# Patient Record
Sex: MALE | Race: WHITE | NOT HISPANIC OR LATINO | URBAN - METROPOLITAN AREA
[De-identification: names, ages, dates, MRNs, and addresses within clinical notes are randomized per-mention and may not be internally consistent; named-entity substitution may affect disease eponyms.]

---

## 2018-06-12 VITALS
TEMPERATURE: 98 F | WEIGHT: 244.93 LBS | RESPIRATION RATE: 14 BRPM | DIASTOLIC BLOOD PRESSURE: 75 MMHG | SYSTOLIC BLOOD PRESSURE: 146 MMHG | HEART RATE: 85 BPM | HEIGHT: 64 IN | OXYGEN SATURATION: 98 %

## 2018-06-12 RX ORDER — CHLORHEXIDINE GLUCONATE 213 G/1000ML
1 SOLUTION TOPICAL ONCE
Qty: 0 | Refills: 0 | Status: DISCONTINUED | OUTPATIENT
Start: 2018-06-13 | End: 2018-06-14

## 2018-06-12 NOTE — H&P ADULT - PMH
Diabetes BPH (benign prostatic hyperplasia)    CAD (coronary artery disease)    Diabetes    HTN (hypertension)

## 2018-06-12 NOTE — H&P ADULT - FAMILY HISTORY
Father  Still living? Unknown  Family history of early CAD, Age at diagnosis: 51-60  Family history of acute myocardial infarction, Age at diagnosis: 51-60     Sibling  Still living? Unknown  Family history of early CAD, Age at diagnosis: 51-60

## 2018-06-12 NOTE — H&P ADULT - NSHPLABSRESULTS_GEN_ALL_CORE
13.4   6.8   )-----------( 208      ( 13 Jun 2018 13:07 )             40.8     06-13    140  |  103  |  14  ----------------------------<  143<H>  4.5   |  23  |  0.76    Ca    8.9      13 Jun 2018 13:07      PT/INR - ( 13 Jun 2018 13:07 )   PT: 10.5 sec;   INR: 0.95          PTT - ( 13 Jun 2018 13:07 )  PTT:30.4 sec    CARDIAC MARKERS ( 13 Jun 2018 13:07 )  x     / x     / 85 U/L / x     / 2.6 ng/mL      EKG: NSR @ 81 BPM with intermittent PACs

## 2018-06-12 NOTE — H&P ADULT - ASSESSMENT
Patient is a 68 y/o Spanish SPEAKING male with PMHx of HTN, non-obstructive CAD (s/p diagnostic cath at [facility] in 2013 which revealed mRCA 30-50%, dLCx 30-50%, LVEF 60%), DM II, BPH who presented to Dr. Solis's office with c/o ALFONSO upon ambulation of 3 city blocks over past couple years, resolving with rest.  In light of pt's risk factors, CCS II anginal equivalent symptoms, and abnormal stress test pt is referred for cardiac catheterization with possible intervention if clinically indicated to rule out suspected underlying CAD    ASA III, Mallampati III    H/H 13.4/40.8. Denies bleeding, GI bleeding, hematemesis, hematuria, BRBPR or melena . Last dose ASA 81 mg last night. Will load with  mg x1 and plavix 600 mg x1 pre-cath.  Cr.0.76.  IV NS@ 75 cc/hr pre-cath.  Pre-cath consented  Sedation Plan: Moderate  Patient is Suitable Candidate for Sedation: Yes  Risks & benefits of procedure and alternative therapy have been explained to the patient including but not limited to: allergic reaction, bleeding w/possible need for blood transfusion, infection, renal and vascular compromise, limb damage, arrhythmia, stroke, vessel dissection/perforation, Myocardial infarction, emergent CABG. Informed consent obtained and in chart

## 2018-06-12 NOTE — H&P ADULT - HISTORY OF PRESENT ILLNESS
Patient is a 68 y/o male with PMHx of DM II    Denies CP, SOB, palpitations, syncope, dizziness, PND/orthopnea or LE edema    Stress test 05/11/2018: EF 57%, large size partially reversible perfusion defect of moderate intensity involving the entire inferior wall suggestive of myocardial ischemia in the posterior coronary circulation distribution    In light of pt's risk factors, CCS ___ anginal symptoms, and abnormal stress test pt is referred for cardiac catheterization with possible intervention if clinically indicated to rule out CAD Patient is a 66 y/o Occitan SPEAKING male with PMHx of DM II    Denies CP, SOB, palpitations, syncope, dizziness, PND/orthopnea or LE edema    Stress test 05/11/2018: EF 57%, large size partially reversible perfusion defect of moderate intensity involving the entire inferior wall suggestive of myocardial ischemia in the posterior coronary circulation distribution    In light of pt's risk factors, CCS ___ anginal symptoms, and abnormal stress test pt is referred for cardiac catheterization with possible intervention if clinically indicated to rule out CAD Patient is a 66 y/o Sami SPEAKING male with PMHx of HTN, non-obstructive CAD (s/p diagnostic cath at [facility] in 2013 which revealed mRCA 30-50%, dLCx 30-50%, LVEF 60%), DM II, BPH who presented to Dr. Solis's office with c/o ALFONSO upon ambulation of 3 city blocks over past couple years, resolving with rest.  Denies CP, SOB at rest, palpitations, syncope, dizziness, PND/orthopnea or LE edema. Stress test 05/11/2018: EF 57%, large size partially reversible perfusion defect of moderate intensity involving the entire inferior wall suggestive of myocardial ischemia in the posterior coronary circulation distribution. In light of pt's risk factors, CCS II anginal equivalent symptoms, and abnormal stress test pt is referred for cardiac catheterization with possible intervention if clinically indicated to rule out CAD    Cath hx:  Diagnostic cath 8/21/13 @ [place] which revealed mRCA 30-50% (IVUS 5.4 mm2), dLCx 30-50%, LVEF 60%

## 2018-06-13 ENCOUNTER — INPATIENT (INPATIENT)
Facility: HOSPITAL | Age: 68
LOS: 0 days | Discharge: HOME | DRG: 247 | End: 2018-06-14
Attending: INTERNAL MEDICINE | Admitting: INTERNAL MEDICINE
Payer: MEDICARE

## 2018-06-13 DIAGNOSIS — Z98.61 CORONARY ANGIOPLASTY STATUS: Chronic | ICD-10-CM

## 2018-06-13 LAB
ANION GAP SERPL CALC-SCNC: 14 MMOL/L — SIGNIFICANT CHANGE UP (ref 5–17)
APTT BLD: 30.4 SEC — SIGNIFICANT CHANGE UP (ref 27.5–37.4)
BASOPHILS NFR BLD AUTO: 0.1 % — SIGNIFICANT CHANGE UP (ref 0–2)
BUN SERPL-MCNC: 14 MG/DL — SIGNIFICANT CHANGE UP (ref 7–23)
CALCIUM SERPL-MCNC: 8.9 MG/DL — SIGNIFICANT CHANGE UP (ref 8.4–10.5)
CHLORIDE SERPL-SCNC: 103 MMOL/L — SIGNIFICANT CHANGE UP (ref 96–108)
CHOLEST SERPL-MCNC: 178 MG/DL — SIGNIFICANT CHANGE UP (ref 10–199)
CK MB CFR SERPL CALC: 2.6 NG/ML — SIGNIFICANT CHANGE UP (ref 0–6.7)
CO2 SERPL-SCNC: 23 MMOL/L — SIGNIFICANT CHANGE UP (ref 22–31)
CREAT SERPL-MCNC: 0.76 MG/DL — SIGNIFICANT CHANGE UP (ref 0.5–1.3)
CRP SERPL-MCNC: 0.72 MG/DL — HIGH (ref 0–0.4)
EOSINOPHIL NFR BLD AUTO: 0.7 % — SIGNIFICANT CHANGE UP (ref 0–6)
GLUCOSE SERPL-MCNC: 143 MG/DL — HIGH (ref 70–99)
HBA1C BLD-MCNC: 8.4 % — HIGH (ref 4–5.6)
HCT VFR BLD CALC: 40.8 % — SIGNIFICANT CHANGE UP (ref 39–50)
HDLC SERPL-MCNC: 50 MG/DL — SIGNIFICANT CHANGE UP (ref 40–125)
HGB BLD-MCNC: 13.4 G/DL — SIGNIFICANT CHANGE UP (ref 13–17)
INR BLD: 0.95 — SIGNIFICANT CHANGE UP (ref 0.88–1.16)
LIPID PNL WITH DIRECT LDL SERPL: 109 MG/DL — SIGNIFICANT CHANGE UP
LYMPHOCYTES # BLD AUTO: 25.8 % — SIGNIFICANT CHANGE UP (ref 13–44)
MCHC RBC-ENTMCNC: 25.7 PG — LOW (ref 27–34)
MCHC RBC-ENTMCNC: 32.8 G/DL — SIGNIFICANT CHANGE UP (ref 32–36)
MCV RBC AUTO: 78.2 FL — LOW (ref 80–100)
MONOCYTES NFR BLD AUTO: 8.9 % — SIGNIFICANT CHANGE UP (ref 2–14)
NEUTROPHILS NFR BLD AUTO: 64.5 % — SIGNIFICANT CHANGE UP (ref 43–77)
PLATELET # BLD AUTO: 208 K/UL — SIGNIFICANT CHANGE UP (ref 150–400)
POTASSIUM SERPL-MCNC: 4.5 MMOL/L — SIGNIFICANT CHANGE UP (ref 3.5–5.3)
POTASSIUM SERPL-SCNC: 4.5 MMOL/L — SIGNIFICANT CHANGE UP (ref 3.5–5.3)
PROTHROM AB SERPL-ACNC: 10.5 SEC — SIGNIFICANT CHANGE UP (ref 9.8–12.7)
RBC # BLD: 5.22 M/UL — SIGNIFICANT CHANGE UP (ref 4.2–5.8)
RBC # FLD: 14.2 % — SIGNIFICANT CHANGE UP (ref 10.3–16.9)
SODIUM SERPL-SCNC: 140 MMOL/L — SIGNIFICANT CHANGE UP (ref 135–145)
TOTAL CHOLESTEROL/HDL RATIO MEASUREMENT: 3.6 RATIO — SIGNIFICANT CHANGE UP (ref 3.4–9.6)
TRIGL SERPL-MCNC: 96 MG/DL — SIGNIFICANT CHANGE UP (ref 10–149)
WBC # BLD: 6.8 K/UL — SIGNIFICANT CHANGE UP (ref 3.8–10.5)
WBC # FLD AUTO: 6.8 K/UL — SIGNIFICANT CHANGE UP (ref 3.8–10.5)

## 2018-06-13 RX ORDER — CLOPIDOGREL BISULFATE 75 MG/1
75 TABLET, FILM COATED ORAL DAILY
Qty: 0 | Refills: 0 | Status: DISCONTINUED | OUTPATIENT
Start: 2018-06-14 | End: 2018-06-14

## 2018-06-13 RX ORDER — INSULIN LISPRO 100/ML
VIAL (ML) SUBCUTANEOUS
Qty: 0 | Refills: 0 | Status: DISCONTINUED | OUTPATIENT
Start: 2018-06-13 | End: 2018-06-14

## 2018-06-13 RX ORDER — SODIUM CHLORIDE 9 MG/ML
1000 INJECTION, SOLUTION INTRAVENOUS
Qty: 0 | Refills: 0 | Status: DISCONTINUED | OUTPATIENT
Start: 2018-06-13 | End: 2018-06-14

## 2018-06-13 RX ORDER — LISINOPRIL 2.5 MG/1
40 TABLET ORAL DAILY
Qty: 0 | Refills: 0 | Status: DISCONTINUED | OUTPATIENT
Start: 2018-06-13 | End: 2018-06-14

## 2018-06-13 RX ORDER — SODIUM CHLORIDE 9 MG/ML
500 INJECTION INTRAMUSCULAR; INTRAVENOUS; SUBCUTANEOUS
Qty: 0 | Refills: 0 | Status: DISCONTINUED | OUTPATIENT
Start: 2018-06-13 | End: 2018-06-13

## 2018-06-13 RX ORDER — INSULIN LISPRO 100/ML
VIAL (ML) SUBCUTANEOUS ONCE
Qty: 0 | Refills: 0 | Status: DISCONTINUED | OUTPATIENT
Start: 2018-06-13 | End: 2018-06-13

## 2018-06-13 RX ORDER — CLOPIDOGREL BISULFATE 75 MG/1
600 TABLET, FILM COATED ORAL ONCE
Qty: 0 | Refills: 0 | Status: COMPLETED | OUTPATIENT
Start: 2018-06-13 | End: 2018-06-13

## 2018-06-13 RX ORDER — GABAPENTIN 400 MG/1
300 CAPSULE ORAL DAILY
Qty: 0 | Refills: 0 | Status: DISCONTINUED | OUTPATIENT
Start: 2018-06-13 | End: 2018-06-14

## 2018-06-13 RX ORDER — ASPIRIN/CALCIUM CARB/MAGNESIUM 324 MG
81 TABLET ORAL DAILY
Qty: 0 | Refills: 0 | Status: DISCONTINUED | OUTPATIENT
Start: 2018-06-14 | End: 2018-06-14

## 2018-06-13 RX ORDER — ASPIRIN/CALCIUM CARB/MAGNESIUM 324 MG
325 TABLET ORAL ONCE
Qty: 0 | Refills: 0 | Status: COMPLETED | OUTPATIENT
Start: 2018-06-13 | End: 2018-06-13

## 2018-06-13 RX ORDER — OXYBUTYNIN CHLORIDE 5 MG
5 TABLET ORAL
Qty: 0 | Refills: 0 | Status: DISCONTINUED | OUTPATIENT
Start: 2018-06-13 | End: 2018-06-14

## 2018-06-13 RX ORDER — TOLTERODINE TARTRATE 1 MG/1
1 TABLET, FILM COATED ORAL
Qty: 0 | Refills: 0 | COMMUNITY

## 2018-06-13 RX ORDER — TAMSULOSIN HYDROCHLORIDE 0.4 MG/1
0.4 CAPSULE ORAL DAILY
Qty: 0 | Refills: 0 | Status: DISCONTINUED | OUTPATIENT
Start: 2018-06-13 | End: 2018-06-14

## 2018-06-13 RX ORDER — SODIUM CHLORIDE 9 MG/ML
1000 INJECTION INTRAMUSCULAR; INTRAVENOUS; SUBCUTANEOUS
Qty: 0 | Refills: 0 | Status: DISCONTINUED | OUTPATIENT
Start: 2018-06-13 | End: 2018-06-14

## 2018-06-13 RX ADMIN — Medication 5 MILLIGRAM(S): at 19:31

## 2018-06-13 RX ADMIN — GABAPENTIN 300 MILLIGRAM(S): 400 CAPSULE ORAL at 19:32

## 2018-06-13 RX ADMIN — SODIUM CHLORIDE 75 MILLILITER(S): 9 INJECTION INTRAMUSCULAR; INTRAVENOUS; SUBCUTANEOUS at 14:48

## 2018-06-13 RX ADMIN — Medication 2: at 22:08

## 2018-06-13 RX ADMIN — LISINOPRIL 40 MILLIGRAM(S): 2.5 TABLET ORAL at 19:31

## 2018-06-13 RX ADMIN — SODIUM CHLORIDE 75 MILLILITER(S): 9 INJECTION INTRAMUSCULAR; INTRAVENOUS; SUBCUTANEOUS at 17:46

## 2018-06-13 RX ADMIN — Medication 325 MILLIGRAM(S): at 14:45

## 2018-06-13 RX ADMIN — SODIUM CHLORIDE 75 MILLILITER(S): 9 INJECTION INTRAMUSCULAR; INTRAVENOUS; SUBCUTANEOUS at 19:31

## 2018-06-13 RX ADMIN — CLOPIDOGREL BISULFATE 600 MILLIGRAM(S): 75 TABLET, FILM COATED ORAL at 14:45

## 2018-06-13 RX ADMIN — TAMSULOSIN HYDROCHLORIDE 0.4 MILLIGRAM(S): 0.4 CAPSULE ORAL at 22:08

## 2018-06-14 ENCOUNTER — TRANSCRIPTION ENCOUNTER (OUTPATIENT)
Age: 68
End: 2018-06-14

## 2018-06-14 VITALS — TEMPERATURE: 97 F

## 2018-06-14 LAB
ANION GAP SERPL CALC-SCNC: 14 MMOL/L — SIGNIFICANT CHANGE UP (ref 5–17)
BUN SERPL-MCNC: 14 MG/DL — SIGNIFICANT CHANGE UP (ref 7–23)
CALCIUM SERPL-MCNC: 9.1 MG/DL — SIGNIFICANT CHANGE UP (ref 8.4–10.5)
CHLORIDE SERPL-SCNC: 98 MMOL/L — SIGNIFICANT CHANGE UP (ref 96–108)
CO2 SERPL-SCNC: 23 MMOL/L — SIGNIFICANT CHANGE UP (ref 22–31)
CREAT SERPL-MCNC: 0.75 MG/DL — SIGNIFICANT CHANGE UP (ref 0.5–1.3)
GLUCOSE BLDC GLUCOMTR-MCNC: 288 MG/DL — HIGH (ref 70–99)
GLUCOSE SERPL-MCNC: 201 MG/DL — HIGH (ref 70–99)
HCT VFR BLD CALC: 41.1 % — SIGNIFICANT CHANGE UP (ref 39–50)
HGB BLD-MCNC: 13.3 G/DL — SIGNIFICANT CHANGE UP (ref 13–17)
MAGNESIUM SERPL-MCNC: 1.8 MG/DL — SIGNIFICANT CHANGE UP (ref 1.6–2.6)
MCHC RBC-ENTMCNC: 25.4 PG — LOW (ref 27–34)
MCHC RBC-ENTMCNC: 32.4 G/DL — SIGNIFICANT CHANGE UP (ref 32–36)
MCV RBC AUTO: 78.4 FL — LOW (ref 80–100)
PLATELET # BLD AUTO: 214 K/UL — SIGNIFICANT CHANGE UP (ref 150–400)
POTASSIUM SERPL-MCNC: 4.1 MMOL/L — SIGNIFICANT CHANGE UP (ref 3.5–5.3)
POTASSIUM SERPL-SCNC: 4.1 MMOL/L — SIGNIFICANT CHANGE UP (ref 3.5–5.3)
RBC # BLD: 5.24 M/UL — SIGNIFICANT CHANGE UP (ref 4.2–5.8)
RBC # FLD: 14.5 % — SIGNIFICANT CHANGE UP (ref 10.3–16.9)
SODIUM SERPL-SCNC: 135 MMOL/L — SIGNIFICANT CHANGE UP (ref 135–145)
WBC # BLD: 7.1 K/UL — SIGNIFICANT CHANGE UP (ref 3.8–10.5)
WBC # FLD AUTO: 7.1 K/UL — SIGNIFICANT CHANGE UP (ref 3.8–10.5)

## 2018-06-14 PROCEDURE — 83735 ASSAY OF MAGNESIUM: CPT

## 2018-06-14 PROCEDURE — 80048 BASIC METABOLIC PNL TOTAL CA: CPT

## 2018-06-14 PROCEDURE — 82550 ASSAY OF CK (CPK): CPT

## 2018-06-14 PROCEDURE — C1769: CPT

## 2018-06-14 PROCEDURE — 82553 CREATINE MB FRACTION: CPT

## 2018-06-14 PROCEDURE — 99238 HOSP IP/OBS DSCHRG MGMT 30/<: CPT

## 2018-06-14 PROCEDURE — 80061 LIPID PANEL: CPT

## 2018-06-14 PROCEDURE — C1725: CPT

## 2018-06-14 PROCEDURE — 85027 COMPLETE CBC AUTOMATED: CPT

## 2018-06-14 PROCEDURE — C1874: CPT

## 2018-06-14 PROCEDURE — 86140 C-REACTIVE PROTEIN: CPT

## 2018-06-14 PROCEDURE — 85730 THROMBOPLASTIN TIME PARTIAL: CPT

## 2018-06-14 PROCEDURE — 85025 COMPLETE CBC W/AUTO DIFF WBC: CPT

## 2018-06-14 PROCEDURE — 82962 GLUCOSE BLOOD TEST: CPT

## 2018-06-14 PROCEDURE — C1889: CPT

## 2018-06-14 PROCEDURE — 36415 COLL VENOUS BLD VENIPUNCTURE: CPT

## 2018-06-14 PROCEDURE — 85610 PROTHROMBIN TIME: CPT

## 2018-06-14 PROCEDURE — 83036 HEMOGLOBIN GLYCOSYLATED A1C: CPT

## 2018-06-14 PROCEDURE — C1887: CPT

## 2018-06-14 RX ORDER — ATORVASTATIN CALCIUM 80 MG/1
1 TABLET, FILM COATED ORAL
Qty: 30 | Refills: 1 | OUTPATIENT
Start: 2018-06-14 | End: 2018-08-12

## 2018-06-14 RX ORDER — CLOPIDOGREL BISULFATE 75 MG/1
1 TABLET, FILM COATED ORAL
Qty: 0 | Refills: 0 | DISCHARGE
Start: 2018-06-14

## 2018-06-14 RX ORDER — CLOPIDOGREL BISULFATE 75 MG/1
1 TABLET, FILM COATED ORAL
Qty: 30 | Refills: 11 | OUTPATIENT
Start: 2018-06-14 | End: 2019-06-08

## 2018-06-14 RX ORDER — SITAGLIPTIN AND METFORMIN HYDROCHLORIDE 500; 50 MG/1; MG/1
1 TABLET, FILM COATED ORAL
Qty: 0 | Refills: 0 | COMMUNITY

## 2018-06-14 RX ORDER — ATORVASTATIN CALCIUM 80 MG/1
1 TABLET, FILM COATED ORAL
Qty: 0 | Refills: 0 | DISCHARGE
Start: 2018-06-14

## 2018-06-14 RX ORDER — ENOXAPARIN SODIUM 100 MG/ML
30 INJECTION SUBCUTANEOUS
Qty: 0 | Refills: 0 | COMMUNITY

## 2018-06-14 RX ORDER — ATORVASTATIN CALCIUM 80 MG/1
40 TABLET, FILM COATED ORAL AT BEDTIME
Qty: 0 | Refills: 0 | Status: DISCONTINUED | OUTPATIENT
Start: 2018-06-14 | End: 2018-06-14

## 2018-06-14 RX ADMIN — GABAPENTIN 300 MILLIGRAM(S): 400 CAPSULE ORAL at 09:52

## 2018-06-14 RX ADMIN — Medication 81 MILLIGRAM(S): at 09:52

## 2018-06-14 RX ADMIN — LISINOPRIL 40 MILLIGRAM(S): 2.5 TABLET ORAL at 06:03

## 2018-06-14 RX ADMIN — Medication 5 MILLIGRAM(S): at 06:03

## 2018-06-14 RX ADMIN — Medication 2: at 06:59

## 2018-06-14 RX ADMIN — CLOPIDOGREL BISULFATE 75 MILLIGRAM(S): 75 TABLET, FILM COATED ORAL at 09:52

## 2018-06-14 NOTE — DISCHARGE NOTE ADULT - PLAN OF CARE
TAKE YOUR MEDICATIONS AS PRESCRIBED You have a diagnosis of coronary artery disease and  received Drug Eluting Stents to your proximal Left Cicumflex and distal Left Anterior descending Coronary Arteries.  You have been started on Aspirin 81mg daily and Plavix (Clopidogrel) 75mg daily. You MUST continue taking the daily Aspirin and Plavix to ensure your stent does not close. DO NOT STOP THESE MEDICATIONS FOR ANY REASON UNLESS OTHERWISE INDICATED BY YOUR CARDIOLOGIST BECAUSE THIS WILL PUT YOU AT RISK FOR A HEART ATTACK. You should refrain from strenuous activity and heavy lifting for 1 week. Please make a follow up appointment with your cardiologist Dr Solis within 1-2 weeks of your discharge. You have also been started on Atorvastatin (Lipitor) 40mg daily at bedtime to aid in preventing the progression of coronary disease and to keep your cholesterol levels controlled.  All of your prescriptions have been sent electronically to your pharmacy.   The catheter from your wrist was removed and bleeding was stopped by manual pressure.  Wash the site daily with soap and water.  There is no need to put on a bandage.      Call the Interventional Cardiology and Vascular Team at [phone] or [phone] if any of following occur pertaining to your vascular access site:  Bleeding or hematoma formation (collection of blood under the skin), drainage or redness at the puncture site, numbness, decrease in strength, coolness or pale coloration of skin of the leg or hand. You may resume your JanuMet on Saturday June 16, 2018.  Continue taking your Lantus as prescribed in the evening.  Follow up with your PMD/Endocrinologist for continued management of your Diabetes Continue taking your Ramipril Continue your BPH medications and follow up with your PMD/Urologist

## 2018-06-14 NOTE — DISCHARGE NOTE ADULT - HOSPITAL COURSE
66 y/o Icelandic/English SPEAKING male with PMHx of HTN, non-obstructive CAD (s/p diagnostic cath at [facility] in 2013 which revealed mRCA 30-50%, dLCx 30-50%, LVEF 60%), DM II, BPH who presented to Dr. Solis's office with c/o ALFONSO upon ambulation of 3 city blocks over past couple years, resolving with rest.  Denies CP, SOB at rest, palpitations, syncope, dizziness, PND/orthopnea or LE edema. Stress test 05/11/2018: EF 57%, large size partially reversible perfusion defect of moderate intensity involving the entire inferior wall suggestive of myocardial ischemia in the posterior coronary circulation distribution. In light of pt's risk factors, CCS II anginal equivalent symptoms, and abnormal stress test pt is referred for cardiac catheterization with possible intervention if clinically indicated to rule out CAD    Pt underwent Cath 6/13 showing     LM- normal, LAD- mid 40%, distal 85%, D1- prox 40%, LCx- prox 80%, RCA- dominant, mid 40%, distal 50%, small vessel distally. Pt had successful PCI STEVEN of prox LCx & distal LAD.  LVEDP 10, NO AS. Right Radial access.  Atorvastatin 40mg once daily at bedtime started.      Pt did well overnight, labs & ECG this am reviewed and stable.  On exam pt feels well with no chest pain, reports mild wrist tenderness over radial site.  Access soft, tender to palpation, no hematoma, pulse 2+, cap refill < 3sec.   Pt cleared for discharge home today and will f/u with Dr Solis in 1-2 weeks.  All prescriptions sent to pt’s pharmacy, instructions given and signed by pt. 66 y/o Georgian/English SPEAKING male with PMHx of HTN, non-obstructive CAD (s/p diagnostic cath at [facility] in 2013 which revealed mRCA 30-50%, dLCx 30-50%, LVEF 60%), DM II, BPH who presented to Dr. Solis's office with c/o ALFONSO upon ambulation of 3 city blocks over past couple years, resolving with rest.  Denies CP, SOB at rest, palpitations, syncope, dizziness, PND/orthopnea or LE edema. Stress test 05/11/2018: EF 57%, large size partially reversible perfusion defect of moderate intensity involving the entire inferior wall suggestive of myocardial ischemia in the posterior coronary circulation distribution. In light of pt's risk factors, CCS II anginal equivalent symptoms, and abnormal stress test pt is referred for cardiac catheterization with possible intervention if clinically indicated to rule out CAD    Pt underwent Cath 6/13 showing     LM- normal, LAD- mid 40%, distal 85%, D1- prox 40%, LCx- prox 80%, RCA- dominant, mid 40%, distal 50%, small vessel distally. Pt had successful PCI STEVEN of prox LCx & distal LAD.  LVEDP 10, NO AS. Right Radial access.  Atorvastatin 40mg once daily at bedtime started.      Pt did well overnight, labs & ECG this am reviewed and stable.  On exam pt feels well with no chest pain, reports mild wrist tenderness over radial site.  Access soft, tender to palpation, no hematoma, pulse 2+, cap refill < 3sec.   Pt cleared for discharge home today and will f/u with Dr Solis in 1-2 weeks.  All prescriptions sent to pt’s pharmacy, instructions given with language line  #[id number] and signed by pt.

## 2018-06-14 NOTE — DISCHARGE NOTE ADULT - MEDICATION SUMMARY - MEDICATIONS TO TAKE
I will START or STAY ON the medications listed below when I get home from the hospital:    Ecotrin Adult Low Strength 81 mg oral delayed release tablet  -- 1 tab(s) by mouth once a day  -- Indication: For CAD (coronary artery disease) and STENT    ramipril 10 mg oral capsule  -- 1 cap(s) by mouth once a day  -- Indication: For HTN (hypertension)    Flomax 0.4 mg oral capsule  -- 1 cap(s) by mouth once a day  -- Indication: For BPH (benign prostatic hyperplasia)    gabapentin 100 mg oral capsule  -- 3 cap(s) by mouth once a day  -- Indication: For Neuropathy    Janumet 50 mg-1000 mg oral tablet  -- 1 tab(s) by mouth 2 times a day. Restart on Saturday 6/16/18  -- Indication: For Diabetes    Lipitor 40 mg oral tablet  -- 1 tab(s) by mouth once a day (at bedtime)  -- Indication: For CAD (coronary artery disease)    Plavix 75 mg oral tablet  -- 1 tab(s) by mouth once a day  -- Indication: For CAD (coronary artery disease) and STENT    tolterodine 2 mg oral tablet  -- 1 tab(s) by mouth once a day  -- Indication: For BPH (benign prostatic hyperplasia)

## 2018-06-14 NOTE — DISCHARGE NOTE ADULT - CARE PROVIDER_API CALL
Tawanda Solis), Cardiovascular Disease; Internal Medicine  [address] [place] [address]  Phone: [phone]  Fax: [phone]

## 2018-06-14 NOTE — DISCHARGE NOTE ADULT - NS AS ACTIVITY OBS
Walking-Outdoors allowed/No Heavy lifting/straining/Stairs allowed/Showering allowed/Walking-Indoors allowed

## 2018-06-14 NOTE — DISCHARGE NOTE ADULT - PATIENT PORTAL LINK FT
You can access the [facility] Patient Portal, offered by [facility], by registering with the following website: [email or web address]

## 2018-06-14 NOTE — DISCHARGE NOTE ADULT - CARE PLAN
Principal Discharge DX:	CAD (coronary artery disease)  Goal:	TAKE YOUR MEDICATIONS AS PRESCRIBED  Assessment and plan of treatment:	You have a diagnosis of coronary artery disease and  received Drug Eluting Stents to your proximal Left Cicumflex and distal Left Anterior descending Coronary Arteries.  You have been started on Aspirin 81mg daily and Plavix (Clopidogrel) 75mg daily. You MUST continue taking the daily Aspirin and Plavix to ensure your stent does not close. DO NOT STOP THESE MEDICATIONS FOR ANY REASON UNLESS OTHERWISE INDICATED BY YOUR CARDIOLOGIST BECAUSE THIS WILL PUT YOU AT RISK FOR A HEART ATTACK. You should refrain from strenuous activity and heavy lifting for 1 week. Please make a follow up appointment with your cardiologist Dr Solis within 1-2 weeks of your discharge. You have also been started on Atorvastatin (Lipitor) 40mg daily at bedtime to aid in preventing the progression of coronary disease and to keep your cholesterol levels controlled.  All of your prescriptions have been sent electronically to your pharmacy.   The catheter from your wrist was removed and bleeding was stopped by manual pressure.  Wash the site daily with soap and water.  There is no need to put on a bandage.      Call the Interventional Cardiology and Vascular Team at [phone] or [phone] if any of following occur pertaining to your vascular access site:  Bleeding or hematoma formation (collection of blood under the skin), drainage or redness at the puncture site, numbness, decrease in strength, coolness or pale coloration of skin of the leg or hand.  Secondary Diagnosis:	Diabetes  Assessment and plan of treatment:	You may resume your JanuMet on Saturday June 16, 2018.  Continue taking your Lantus as prescribed in the evening.  Follow up with your PMD/Endocrinologist for continued management of your Diabetes  Secondary Diagnosis:	Essential hypertension  Assessment and plan of treatment:	Continue taking your Ramipril  Secondary Diagnosis:	BPH (benign prostatic hyperplasia)  Assessment and plan of treatment:	Continue your BPH medications and follow up with your PMD/Urologist

## 2018-06-18 DIAGNOSIS — Z79.4 LONG TERM (CURRENT) USE OF INSULIN: ICD-10-CM

## 2018-06-18 DIAGNOSIS — N40.0 BENIGN PROSTATIC HYPERPLASIA WITHOUT LOWER URINARY TRACT SYMPTOMS: ICD-10-CM

## 2018-06-18 DIAGNOSIS — I25.118 ATHEROSCLEROTIC HEART DISEASE OF NATIVE CORONARY ARTERY WITH OTHER FORMS OF ANGINA PECTORIS: ICD-10-CM

## 2018-06-18 DIAGNOSIS — E11.9 TYPE 2 DIABETES MELLITUS WITHOUT COMPLICATIONS: ICD-10-CM

## 2018-06-18 DIAGNOSIS — I10 ESSENTIAL (PRIMARY) HYPERTENSION: ICD-10-CM

## 2018-06-18 DIAGNOSIS — E66.9 OBESITY, UNSPECIFIED: ICD-10-CM

## 2019-04-01 ENCOUNTER — APPOINTMENT (OUTPATIENT)
Dept: SURGERY | Facility: CLINIC | Age: 69
End: 2019-04-01
Payer: MEDICARE

## 2019-04-01 PROCEDURE — 99203 OFFICE O/P NEW LOW 30 MIN: CPT

## 2019-04-04 PROBLEM — Z00.00 ENCOUNTER FOR PREVENTIVE HEALTH EXAMINATION: Status: ACTIVE | Noted: 2019-04-04

## 2019-04-07 PROBLEM — N40.0 BENIGN PROSTATIC HYPERPLASIA WITHOUT LOWER URINARY TRACT SYMPTOMS: Chronic | Status: ACTIVE | Noted: 2018-06-13

## 2019-04-07 PROBLEM — I25.10 ATHEROSCLEROTIC HEART DISEASE OF NATIVE CORONARY ARTERY WITHOUT ANGINA PECTORIS: Chronic | Status: ACTIVE | Noted: 2018-06-13

## 2019-04-07 PROBLEM — I10 ESSENTIAL (PRIMARY) HYPERTENSION: Chronic | Status: ACTIVE | Noted: 2018-06-13

## 2019-04-07 PROBLEM — E11.9 TYPE 2 DIABETES MELLITUS WITHOUT COMPLICATIONS: Chronic | Status: ACTIVE | Noted: 2018-06-12

## 2021-06-14 ENCOUNTER — APPOINTMENT (OUTPATIENT)
Dept: DISASTER EMERGENCY | Facility: CLINIC | Age: 71
End: 2021-06-14

## 2021-06-14 VITALS
SYSTOLIC BLOOD PRESSURE: 149 MMHG | HEART RATE: 79 BPM | TEMPERATURE: 97 F | OXYGEN SATURATION: 100 % | RESPIRATION RATE: 16 BRPM | DIASTOLIC BLOOD PRESSURE: 87 MMHG

## 2021-06-14 RX ORDER — CHLORHEXIDINE GLUCONATE 213 G/1000ML
1 SOLUTION TOPICAL ONCE
Refills: 0 | Status: DISCONTINUED | OUTPATIENT
Start: 2021-06-16 | End: 2021-06-17

## 2021-06-14 NOTE — H&P ADULT - ASSESSMENT
71 yo Occitan speaking M, poor historian, PMHx HTN, HLD, DM2 with neuropathy, CAD (s/p cardiac cath 6/12/18 @ [facility] revealing dLAD 85% s/p STEVEN, pLCx 80% s/p STEVEN with residual mLAD 40%, D1 40%, mRCA 40%, dRCA 50% - small vessel distally), BPH who is referred for cardiac catheterization with possible intervention to r/o progressively worsening CAD 2/2 patient’s risk factors, known h/o CAD,  CCS II anginal equivalent symptoms, abnormal stress test      H/H . Pt denies bleeding, GI bleeding, hematemesis, hematuria, BRBPR or melena . ASA … and Plavix … pre-cath.  Cr. IV NS@ 75  cc/hr pre-cath.  Pt. precath consented with help of Occitan Language Line Solution ID # ...     Risks & benefits of procedure and alternative therapy have been explained to the patient including but not limited to: allergic reaction, bleeding w/possible need for blood transfusion, infection, renal and vascular compromise, limb damage, arrhythmia, stroke, vessel dissection/perforation, Myocardial infarction, emergent CABG. Informed consent obtained and in chart   69 yo Armenian/English speaking M, poor historian, PMHx HTN, HLD, DM2 with neuropathy, CAD (s/p cardiac cath 6/12/18 @ [facility] revealing dLAD 85% s/p STEVEN, pLCx 80% s/p STEVEN with residual mLAD 40%, D1 40%, mRCA 40%, dRCA 50% - small vessel distally), BPH who is referred for cardiac catheterization with possible intervention to r/o progressively worsening CAD 2/2 patient’s risk factors, known h/o CAD,  CCS II anginal equivalent symptoms, abnormal stress test      H/H 14/47 . Pt denies bleeding, GI bleeding, hematemesis, hematuria, BRBPR or melena . Pt. loaded with  mg PO X 1 and Plavix 600 mg PO x 1 pre-cath.  Cr. IV NS@ 75  cc/hr pre-cath.  Pt. precath consented with help of Armenian Language Line Solution ID # [id number] and with help of Dr. callahan    Risks & benefits of procedure and alternative therapy have been explained to the patient including but not limited to: allergic reaction, bleeding w/possible need for blood transfusion, infection, renal and vascular compromise, limb damage, arrhythmia, stroke, vessel dissection/perforation, Myocardial infarction, emergent CABG. Informed consent obtained and in chart

## 2021-06-14 NOTE — H&P ADULT - NSICDXFAMILYHX_GEN_ALL_CORE_FT
FAMILY HISTORY:  Father  Still living? Unknown  Family history of acute myocardial infarction, Age at diagnosis: 51-60  Family history of early CAD, Age at diagnosis: 51-60    Sibling  Still living? Unknown  Family history of early CAD, Age at diagnosis: 51-60

## 2021-06-14 NOTE — H&P ADULT - HISTORY OF PRESENT ILLNESS
Skeleton  Verify meds  Cardiologist: Dr. Solis  Pharmacy:    71 y/o Turkmen speaking M with PMHx of  HTN, DM2,  CAD (s/p cardiac cath 6/12/18 @ [facility] revealing dLAD 85% s/p STEVEN, pLCx 80% s/p STEVEN with residual mLAD 40%, D1 40%, mRCA 40%, dRCA 50% - small vessel distally), BPH who presented to Dr. Solis's office with c/o     Denies CP, SOB, dizziness, diaphoresis, palpitations, fatigue, LE edema, orthopnea, PND, syncope, N/V, abdominal pain, cough, URI symptoms, f/c, recent travel, sick contact, exposure to Covid.     NST 06/04/2021: EF 49%, large size reversible perfusion defect of moderate-severe intensity involving the entire (basal-apical) inferior/inferolateral myocardial walls suggestive of inducible myocardial ischemia of the posterior coronary circulation distribution; compared to the prior study dated 07/10/2020 the current study now reveals of inducible myocardial ischemia.         In light of patient’s risk factors, known h/o CAD,  CCS --- anginal symptoms, abnormal stress test, pt referred for cardiac catheterization with possible intervention to r/o progressively worsening CAD.     Cath hx:  Diagnostic cath 8/21/13 @ [place]: mRCA 30-50% (IVUS 5.4 mm2), dLCx 30-50%, LVEF 60%  Cardiac cath 6/12/18 @ [facility]: LMCA normal, mLAD 40%, dLAD 85% s/p STEVEN, D1 40%, pLCx 80% s/p STEVEN, mRCA 40%, dRCA 50% (small vessel distally)   Information obtained with assistance of Turkish speaking [employer]  Jamila #[id number]     Verify meds - will bring meds  Cardiologist: Dr. Solis  Pharmacy: [facility]  Covid:  2nd vaccine scheduled @ [facility] on 6/21, planning on getting covid swab today, will call hotline and make an appointment at [facility]    69 yo Turkish speaking M, poor historian, PMHx HTN, HLD, DM2 with neuropathy, CAD (s/p cardiac cath 6/12/18 @ [facility] revealing dLAD 85% s/p STEVEN, pLCx 80% s/p STEVEN with residual mLAD 40%, D1 40%, mRCA 40%, dRCA 50% - small vessel distally), BPH who presented to Dr. Solis's office c/o lightheadedness/dizziness/fatigue/ALFONSO  upon ambulation of 3 city blocks, resolving with rest, over past 4 months. Pt also endorses intermittent episodes of PND at night & dizziness upon standing. Furthermore, pt endorses occasional palpitations not related to previous symptoms over past 3 months. Denies CP, diaphoresis, LE edema, orthopnea, syncope, N/V, abdominal pain. NST 06/04/2021: EF 49%, large size reversible perfusion defect of moderate-severe intensity involving the entire (basal-apical) inferior/inferolateral myocardial walls suggestive of inducible myocardial ischemia of the posterior coronary circulation distribution; compared to the prior study dated 07/10/2020 the current study now reveals of inducible myocardial ischemia.  In light of patient’s risk factors, known h/o CAD,  CCS II anginal equivalent symptoms, abnormal stress test, pt referred for cardiac catheterization with possible intervention to r/o progressively worsening CAD.     Cath hx:  Diagnostic cath 8/21/13 @ [place]: mRCA 30-50% (IVUS 5.4 mm2), dLCx 30-50%, LVEF 60%  Cardiac cath 6/12/18 @ [facility]: LMCA normal, mLAD 40%, dLAD 85% s/p STEVEN, D1 40%, pLCx 80% s/p STEVEN, mRCA 40%, dRCA 50% (small vessel distally)   Information obtained with assistance of Estonian speaking [employer]  Jamila #[id number]       Cardiologist: Dr. Solis  Pharmacy: [facility]  Covid:  2nd vaccine scheduled @ [facility] on 6/21, COVID neg 6/16/21; HIE  Escort:     71 yo Estonian speaking M, poor historian, PMHx HTN, HLD, DM2 with neuropathy, CAD (s/p cardiac cath 6/12/18 @ [facility] revealing dLAD 85% s/p STEVEN, pLCx 80% s/p STEVEN with residual mLAD 40%, D1 40%, mRCA 40%, dRCA 50% - small vessel distally), BPH who presented to Dr. Solis's office c/o lightheadedness/dizziness/fatigue/ALFONSO  upon ambulation of 3 city blocks, resolving with rest, over past 4 months. Pt also endorses intermittent episodes of PND at night & dizziness upon standing. Furthermore, pt endorses occasional palpitations not related to previous symptoms over past 3 months. Denies CP, diaphoresis, LE edema, orthopnea, syncope, N/V, abdominal pain. NST 06/04/2021: EF 49%, large size reversible perfusion defect of moderate-severe intensity involving the entire (basal-apical) inferior/inferolateral myocardial walls suggestive of inducible myocardial ischemia of the posterior coronary circulation distribution; compared to the prior study dated 07/10/2020 the current study now reveals of inducible myocardial ischemia.  In light of patient’s risk factors, known h/o CAD,  CCS II anginal equivalent symptoms, abnormal stress test, pt referred for cardiac catheterization with possible intervention to r/o progressively worsening CAD.     Cath hx:  Diagnostic cath 8/21/13 @ [place]: mRCA 30-50% (IVUS 5.4 mm2), dLCx 30-50%, LVEF 60%  Cardiac cath 6/12/18 @ [facility]: LMCA normal, mLAD 40%, dLAD 85% s/p STEVEN, D1 40%, pLCx 80% s/p STEVEN, mRCA 40%, dRCA 50% (small vessel distally)   Information obtained with assistance of [place]ight speaking [employer]  Jamila #[id number]       Cardiologist: Dr. Solis  Pharmacy: [facility]  Covid:  2nd vaccine scheduled @ [facility] on 6/21, COVID neg 6/16/21; HIE  Escort: friend    71 yo Tamazight/English speaking M, poor historian, PMHx HTN, HLD, DM2 with neuropathy, CAD (s/p cardiac cath 6/12/18 @ [facility] revealing dLAD 85% s/p STEVEN, pLCx 80% s/p STEVEN with residual mLAD 40%, D1 40%, mRCA 40%, dRCA 50% - small vessel distally), BPH who presented to Dr. Solis's office c/o lightheadedness/dizziness/fatigue/ALFONSO  upon ambulation of 3 city blocks, resolving with rest, over past 4 months. Pt also endorses intermittent episodes of PND at night & dizziness upon standing. Furthermore, pt endorses occasional palpitations not related to previous symptoms over past 3 months. Denies CP, diaphoresis, LE edema, orthopnea, syncope, N/V, abdominal pain. NST 06/04/2021: EF 49%, large size reversible perfusion defect of moderate-severe intensity involving the entire (basal-apical) inferior/inferolateral myocardial walls suggestive of inducible myocardial ischemia of the posterior coronary circulation distribution; compared to the prior study dated 07/10/2020 the current study now reveals of inducible myocardial ischemia.  In light of patient’s risk factors, known h/o CAD,  CCS II anginal equivalent symptoms, abnormal stress test, pt referred for cardiac catheterization with possible intervention to r/o progressively worsening CAD.     Cath hx:  Diagnostic cath 8/21/13 @ [place]: mRCA 30-50% (IVUS 5.4 mm2), dLCx 30-50%, LVEF 60%  Cardiac cath 6/12/18 @ [facility]: LMCA normal, mLAD 40%, dLAD 85% s/p STEVEN, D1 40%, pLCx 80% s/p STEVEN, mRCA 40%, dRCA 50% (small vessel distally)

## 2021-06-14 NOTE — H&P ADULT - NSHPLABSRESULTS_GEN_ALL_CORE
14.9   7.15  )-----------( 234      ( 16 Jun 2021 10:55 )             47.2   PT/INR - ( 16 Jun 2021 10:55 )   PT: 11.6 sec;   INR: 0.97          PTT - ( 16 Jun 2021 10:55 )  PTT:33.8 sec

## 2021-06-14 NOTE — H&P ADULT - NSICDXPASTMEDICALHX_GEN_ALL_CORE_FT
PAST MEDICAL HISTORY:  BPH (benign prostatic hyperplasia)     CAD (coronary artery disease)     Diabetes     HTN (hypertension)

## 2021-06-16 ENCOUNTER — TRANSCRIPTION ENCOUNTER (OUTPATIENT)
Age: 71
End: 2021-06-16

## 2021-06-16 ENCOUNTER — INPATIENT (INPATIENT)
Facility: HOSPITAL | Age: 71
LOS: 0 days | Discharge: HOME | DRG: 247 | End: 2021-06-17
Attending: HOSPITALIST | Admitting: HOSPITALIST
Payer: MEDICARE

## 2021-06-16 DIAGNOSIS — Z98.61 CORONARY ANGIOPLASTY STATUS: Chronic | ICD-10-CM

## 2021-06-16 LAB
A1C WITH ESTIMATED AVERAGE GLUCOSE RESULT: 9.6 % — HIGH (ref 4–5.6)
ALBUMIN SERPL ELPH-MCNC: 3.8 G/DL — SIGNIFICANT CHANGE UP (ref 3.3–5)
ALP SERPL-CCNC: 89 U/L — SIGNIFICANT CHANGE UP (ref 40–120)
ALT FLD-CCNC: 8 U/L — LOW (ref 10–45)
ANION GAP SERPL CALC-SCNC: 10 MMOL/L — SIGNIFICANT CHANGE UP (ref 5–17)
APTT BLD: 33.8 SEC — SIGNIFICANT CHANGE UP (ref 27.5–35.5)
AST SERPL-CCNC: 7 U/L — LOW (ref 10–40)
BASOPHILS # BLD AUTO: 0.01 K/UL — SIGNIFICANT CHANGE UP (ref 0–0.2)
BASOPHILS NFR BLD AUTO: 0.1 % — SIGNIFICANT CHANGE UP (ref 0–2)
BILIRUB SERPL-MCNC: 0.4 MG/DL — SIGNIFICANT CHANGE UP (ref 0.2–1.2)
BUN SERPL-MCNC: 13 MG/DL — SIGNIFICANT CHANGE UP (ref 7–23)
CALCIUM SERPL-MCNC: 9.5 MG/DL — SIGNIFICANT CHANGE UP (ref 8.4–10.5)
CHLORIDE SERPL-SCNC: 102 MMOL/L — SIGNIFICANT CHANGE UP (ref 96–108)
CHOLEST SERPL-MCNC: 98 MG/DL — SIGNIFICANT CHANGE UP
CK MB CFR SERPL CALC: 1.6 NG/ML — SIGNIFICANT CHANGE UP (ref 0–6.7)
CK SERPL-CCNC: 55 U/L — SIGNIFICANT CHANGE UP (ref 30–200)
CO2 SERPL-SCNC: 29 MMOL/L — SIGNIFICANT CHANGE UP (ref 22–31)
CREAT SERPL-MCNC: 0.76 MG/DL — SIGNIFICANT CHANGE UP (ref 0.5–1.3)
EOSINOPHIL # BLD AUTO: 0.05 K/UL — SIGNIFICANT CHANGE UP (ref 0–0.5)
EOSINOPHIL NFR BLD AUTO: 0.7 % — SIGNIFICANT CHANGE UP (ref 0–6)
ESTIMATED AVERAGE GLUCOSE: 229 MG/DL — HIGH (ref 68–114)
GLUCOSE BLDC GLUCOMTR-MCNC: 174 MG/DL — HIGH (ref 70–99)
GLUCOSE BLDC GLUCOMTR-MCNC: 178 MG/DL — HIGH (ref 70–99)
GLUCOSE BLDC GLUCOMTR-MCNC: 204 MG/DL — HIGH (ref 70–99)
GLUCOSE BLDC GLUCOMTR-MCNC: 231 MG/DL — HIGH (ref 70–99)
GLUCOSE SERPL-MCNC: 278 MG/DL — HIGH (ref 70–99)
HCT VFR BLD CALC: 47.2 % — SIGNIFICANT CHANGE UP (ref 39–50)
HDLC SERPL-MCNC: 39 MG/DL — LOW
HGB BLD-MCNC: 14.9 G/DL — SIGNIFICANT CHANGE UP (ref 13–17)
IMM GRANULOCYTES NFR BLD AUTO: 0.4 % — SIGNIFICANT CHANGE UP (ref 0–1.5)
INR BLD: 0.97 — SIGNIFICANT CHANGE UP (ref 0.88–1.16)
LIPID PNL WITH DIRECT LDL SERPL: 32 MG/DL — SIGNIFICANT CHANGE UP
LYMPHOCYTES # BLD AUTO: 2.04 K/UL — SIGNIFICANT CHANGE UP (ref 1–3.3)
LYMPHOCYTES # BLD AUTO: 28.5 % — SIGNIFICANT CHANGE UP (ref 13–44)
MCHC RBC-ENTMCNC: 26 PG — LOW (ref 27–34)
MCHC RBC-ENTMCNC: 31.6 GM/DL — LOW (ref 32–36)
MCV RBC AUTO: 82.2 FL — SIGNIFICANT CHANGE UP (ref 80–100)
MONOCYTES # BLD AUTO: 0.61 K/UL — SIGNIFICANT CHANGE UP (ref 0–0.9)
MONOCYTES NFR BLD AUTO: 8.5 % — SIGNIFICANT CHANGE UP (ref 2–14)
NEUTROPHILS # BLD AUTO: 4.41 K/UL — SIGNIFICANT CHANGE UP (ref 1.8–7.4)
NEUTROPHILS NFR BLD AUTO: 61.8 % — SIGNIFICANT CHANGE UP (ref 43–77)
NON HDL CHOLESTEROL: 59 MG/DL — SIGNIFICANT CHANGE UP
NRBC # BLD: 0 /100 WBCS — SIGNIFICANT CHANGE UP (ref 0–0)
PLATELET # BLD AUTO: 234 K/UL — SIGNIFICANT CHANGE UP (ref 150–400)
POTASSIUM SERPL-MCNC: 4.5 MMOL/L — SIGNIFICANT CHANGE UP (ref 3.5–5.3)
POTASSIUM SERPL-SCNC: 4.5 MMOL/L — SIGNIFICANT CHANGE UP (ref 3.5–5.3)
PROT SERPL-MCNC: 7.9 G/DL — SIGNIFICANT CHANGE UP (ref 6–8.3)
PROTHROM AB SERPL-ACNC: 11.6 SEC — SIGNIFICANT CHANGE UP (ref 10.6–13.6)
RBC # BLD: 5.74 M/UL — SIGNIFICANT CHANGE UP (ref 4.2–5.8)
RBC # FLD: 14.6 % — HIGH (ref 10.3–14.5)
SODIUM SERPL-SCNC: 141 MMOL/L — SIGNIFICANT CHANGE UP (ref 135–145)
TRIGL SERPL-MCNC: 136 MG/DL — SIGNIFICANT CHANGE UP
WBC # BLD: 7.15 K/UL — SIGNIFICANT CHANGE UP (ref 3.8–10.5)
WBC # FLD AUTO: 7.15 K/UL — SIGNIFICANT CHANGE UP (ref 3.8–10.5)

## 2021-06-16 PROCEDURE — 99223 1ST HOSP IP/OBS HIGH 75: CPT

## 2021-06-16 PROCEDURE — 99152 MOD SED SAME PHYS/QHP 5/>YRS: CPT

## 2021-06-16 PROCEDURE — 92928 PRQ TCAT PLMT NTRAC ST 1 LES: CPT | Mod: LC

## 2021-06-16 PROCEDURE — 93458 L HRT ARTERY/VENTRICLE ANGIO: CPT | Mod: 26,59

## 2021-06-16 PROCEDURE — 93010 ELECTROCARDIOGRAM REPORT: CPT

## 2021-06-16 RX ORDER — SODIUM CHLORIDE 9 MG/ML
500 INJECTION INTRAMUSCULAR; INTRAVENOUS; SUBCUTANEOUS
Refills: 0 | Status: DISCONTINUED | OUTPATIENT
Start: 2021-06-16 | End: 2021-06-16

## 2021-06-16 RX ORDER — GABAPENTIN 400 MG/1
3 CAPSULE ORAL
Qty: 0 | Refills: 0 | DISCHARGE

## 2021-06-16 RX ORDER — DEXTROSE 50 % IN WATER 50 %
15 SYRINGE (ML) INTRAVENOUS ONCE
Refills: 0 | Status: DISCONTINUED | OUTPATIENT
Start: 2021-06-16 | End: 2021-06-17

## 2021-06-16 RX ORDER — DEXTROSE 50 % IN WATER 50 %
12.5 SYRINGE (ML) INTRAVENOUS ONCE
Refills: 0 | Status: DISCONTINUED | OUTPATIENT
Start: 2021-06-16 | End: 2021-06-17

## 2021-06-16 RX ORDER — LISINOPRIL 2.5 MG/1
40 TABLET ORAL DAILY
Refills: 0 | Status: DISCONTINUED | OUTPATIENT
Start: 2021-06-17 | End: 2021-06-17

## 2021-06-16 RX ORDER — OXYBUTYNIN CHLORIDE 5 MG
5 TABLET ORAL
Refills: 0 | Status: DISCONTINUED | OUTPATIENT
Start: 2021-06-16 | End: 2021-06-17

## 2021-06-16 RX ORDER — CLOPIDOGREL BISULFATE 75 MG/1
600 TABLET, FILM COATED ORAL ONCE
Refills: 0 | Status: COMPLETED | OUTPATIENT
Start: 2021-06-16 | End: 2021-06-16

## 2021-06-16 RX ORDER — ASPIRIN/CALCIUM CARB/MAGNESIUM 324 MG
81 TABLET ORAL DAILY
Refills: 0 | Status: DISCONTINUED | OUTPATIENT
Start: 2021-06-17 | End: 2021-06-17

## 2021-06-16 RX ORDER — GLUCAGON INJECTION, SOLUTION 0.5 MG/.1ML
1 INJECTION, SOLUTION SUBCUTANEOUS ONCE
Refills: 0 | Status: DISCONTINUED | OUTPATIENT
Start: 2021-06-16 | End: 2021-06-17

## 2021-06-16 RX ORDER — TOLTERODINE TARTRATE 1 MG/1
1 TABLET, FILM COATED ORAL
Qty: 0 | Refills: 0 | DISCHARGE

## 2021-06-16 RX ORDER — DEXTROSE 50 % IN WATER 50 %
25 SYRINGE (ML) INTRAVENOUS ONCE
Refills: 0 | Status: DISCONTINUED | OUTPATIENT
Start: 2021-06-16 | End: 2021-06-17

## 2021-06-16 RX ORDER — INSULIN LISPRO 100/ML
VIAL (ML) SUBCUTANEOUS
Refills: 0 | Status: DISCONTINUED | OUTPATIENT
Start: 2021-06-16 | End: 2021-06-17

## 2021-06-16 RX ORDER — TAMSULOSIN HYDROCHLORIDE 0.4 MG/1
0.4 CAPSULE ORAL AT BEDTIME
Refills: 0 | Status: DISCONTINUED | OUTPATIENT
Start: 2021-06-16 | End: 2021-06-17

## 2021-06-16 RX ORDER — ASPIRIN/CALCIUM CARB/MAGNESIUM 324 MG
325 TABLET ORAL ONCE
Refills: 0 | Status: COMPLETED | OUTPATIENT
Start: 2021-06-16 | End: 2021-06-16

## 2021-06-16 RX ORDER — INSULIN LISPRO 100/ML
VIAL (ML) SUBCUTANEOUS AT BEDTIME
Refills: 0 | Status: DISCONTINUED | OUTPATIENT
Start: 2021-06-16 | End: 2021-06-16

## 2021-06-16 RX ORDER — INSULIN LISPRO 100/ML
VIAL (ML) SUBCUTANEOUS ONCE
Refills: 0 | Status: COMPLETED | OUTPATIENT
Start: 2021-06-16 | End: 2021-06-16

## 2021-06-16 RX ORDER — CLOPIDOGREL BISULFATE 75 MG/1
75 TABLET, FILM COATED ORAL DAILY
Refills: 0 | Status: DISCONTINUED | OUTPATIENT
Start: 2021-06-17 | End: 2021-06-17

## 2021-06-16 RX ORDER — SODIUM CHLORIDE 9 MG/ML
1000 INJECTION, SOLUTION INTRAVENOUS
Refills: 0 | Status: DISCONTINUED | OUTPATIENT
Start: 2021-06-16 | End: 2021-06-17

## 2021-06-16 RX ORDER — ATORVASTATIN CALCIUM 80 MG/1
40 TABLET, FILM COATED ORAL AT BEDTIME
Refills: 0 | Status: DISCONTINUED | OUTPATIENT
Start: 2021-06-16 | End: 2021-06-17

## 2021-06-16 RX ORDER — SODIUM CHLORIDE 9 MG/ML
500 INJECTION INTRAMUSCULAR; INTRAVENOUS; SUBCUTANEOUS
Refills: 0 | Status: DISCONTINUED | OUTPATIENT
Start: 2021-06-16 | End: 2021-06-17

## 2021-06-16 RX ADMIN — SODIUM CHLORIDE 75 MILLILITER(S): 9 INJECTION INTRAMUSCULAR; INTRAVENOUS; SUBCUTANEOUS at 15:06

## 2021-06-16 RX ADMIN — Medication 4: at 12:36

## 2021-06-16 RX ADMIN — TAMSULOSIN HYDROCHLORIDE 0.4 MILLIGRAM(S): 0.4 CAPSULE ORAL at 21:12

## 2021-06-16 RX ADMIN — SODIUM CHLORIDE 75 MILLILITER(S): 9 INJECTION INTRAMUSCULAR; INTRAVENOUS; SUBCUTANEOUS at 11:59

## 2021-06-16 RX ADMIN — Medication 5 MILLIGRAM(S): at 18:58

## 2021-06-16 RX ADMIN — CLOPIDOGREL BISULFATE 600 MILLIGRAM(S): 75 TABLET, FILM COATED ORAL at 11:58

## 2021-06-16 RX ADMIN — Medication 1: at 21:59

## 2021-06-16 RX ADMIN — ATORVASTATIN CALCIUM 40 MILLIGRAM(S): 80 TABLET, FILM COATED ORAL at 21:12

## 2021-06-16 RX ADMIN — Medication 325 MILLIGRAM(S): at 11:58

## 2021-06-16 NOTE — DISCHARGE NOTE PROVIDER - CARE PROVIDER_API CALL
Tawanda Solis)  Cardiovascular Disease; Interventional Cardiology  [address] [place] [address]  Phone: [phone]  Fax: [phone]  Follow Up Time: 1 week

## 2021-06-16 NOTE — DISCHARGE NOTE PROVIDER - INSTRUCTIONS
Please continue to follow a heart healthy diet, low in sodium, cholesterol and fats. A Mediterranean Diet is recommended.

## 2021-06-16 NOTE — DISCHARGE NOTE PROVIDER - NSDCMRMEDTOKEN_GEN_ALL_CORE_FT
Ecotrin Adult Low Strength 81 mg oral delayed release tablet: 1 tab(s) orally once a day  Flomax 0.4 mg oral capsule: 1 cap(s) orally once a day  Janumet 50 mg-1000 mg oral tablet: 1 tab(s) orally 2 times a day. Restart on Saturday 6/16/18  Jardiance 10 mg oral tablet: 1 tab(s) orally once a day (in the morning)  Lipitor 40 mg oral tablet: 1 tab(s) orally once a day (at bedtime)  ramipril 10 mg oral capsule: 1 cap(s) orally once a day  tolterodine 4 mg oral capsule, extended release: 1 cap(s) orally once a day   clopidogrel 75 mg oral tablet: 1 tab(s) orally once a day  Ecotrin Adult Low Strength 81 mg oral delayed release tablet: 1 tab(s) orally once a day  Flomax 0.4 mg oral capsule: 1 cap(s) orally once a day  Janumet 50 mg-1000 mg oral tablet: 1 tab(s) orally 2 times a day (Restart on 6/19/21)  Jardiance 10 mg oral tablet: 1 tab(s) orally once a day (in the morning)  Lipitor 40 mg oral tablet: 1 tab(s) orally once a day (at bedtime)  ramipril 10 mg oral capsule: 1 cap(s) orally once a day  tolterodine 4 mg oral capsule, extended release: 1 cap(s) orally once a day

## 2021-06-16 NOTE — DISCHARGE NOTE PROVIDER - HOSPITAL COURSE
69 yo Hebrew/English speaking M, poor historian, PMHx HTN, HLD, DM2 with neuropathy, CAD (s/p cardiac cath 6/12/18 @ [facility] revealing dLAD 85% s/p STEVEN, pLCx 80% s/p STEVEN with residual mLAD 40%, D1 40%, mRCA 40%, dRCA 50% - small vessel distally), BPH who presented to Dr. Solis's office c/o lightheadedness/dizziness/fatigue/[place]ONSO  upon ambulation of 3 city blocks, resolving with rest, over past 4 months. Pt also endorses intermittent episodes of PND at night & dizziness upon standing. Furthermore, pt endorses occasional palpitations not related to previous symptoms over past 3 months. NST 06/04/2021: EF 49%, large size reversible perfusion defect of moderate-severe intensity involving the entire (basal-apical) inferior/inferolateral myocardial walls suggestive of inducible myocardial ischemia of the posterior coronary circulation distribution; compared to the prior study dated 07/10/2020 the current study now reveals of inducible myocardial ischemia.  In light of patient’s risk factors, known h/o CAD,  CCS II anginal equivalent symptoms, abnormal stress test, pt referred for cardiac catheterization with possible intervention to r/o progressively worsening CAD.     Patient was referred for cardiac catheterization on 6/16/21 revealing STEVEN x 1 OM1 (90%), LMCA normal, mLAD 30%, dLCx patent stent, mRCA 40%, ectatic, RPLS subtotal, EF 49%, EDP 8mmHg, procedure done via R radial access with hemostasis achieved by R radial TR band. Pt. seen and examined at bedside this morning, without complaints and is out of bed ambulating. R radial access site stable without bleeding or hematoma, distal pulses intact. Vital signs stable, labs and telemetry reviewed. Home medication regimen reviewed with Dr. Hathaway and patient is to continue taking lipitor 40 mg QD, ramipril 10 mg QD, jardiance 10 mg QD, janumet 50 mg-1000 mg BID (restart 6/18/21), tolterodine 4 mg QD, flomax 0.4 mg QD, aspirin 81 mg QD and plavix 75 mg QD (new). Pt. stable for discharge as per Dr. Hathaway and to f/u with Dr. Solis in 1-2 weeks. Patient has been given appropriate discharge instructions including medication regimen, access site management and follow up. Prescriptions have been e-prescribed to patient's preferred pharmacy.     71 yo Estonian/English speaking M, poor historian, PMHx HTN, HLD, DM2 with neuropathy, CAD (s/p cardiac cath 6/12/18 @ [facility] revealing dLAD 85% s/p STEVEN, pLCx 80% s/p STEVEN with residual mLAD 40%, D1 40%, mRCA 40%, dRCA 50% - small vessel distally), BPH who presented to Dr. Solis's office c/o lightheadedness/dizziness/fatigue/ALFONSO  upon ambulation of 3 city [place], resolving with rest, over past 4 months. Pt also endorses intermittent episodes of PND at night & dizziness upon standing. Furthermore, pt endorses occasional palpitations not related to previous symptoms over past 3 months. NST 06/04/2021: EF 49%, large size reversible perfusion defect of moderate-severe intensity involving the entire (basal-apical) inferior/inferolateral myocardial walls suggestive of inducible myocardial ischemia of the posterior coronary circulation distribution; compared to the prior study dated 07/10/2020 the current study now reveals of inducible myocardial ischemia.  In light of patient’s risk factors, known h/o CAD,  CCS II anginal equivalent symptoms, abnormal stress test, pt referred for cardiac catheterization with possible intervention to r/o progressively worsening CAD.     Patient was referred for cardiac catheterization on 6/16/21 revealing STEVEN x 1 OM1 (90%), LMCA normal, mLAD 30%, dLCx patent stent, mRCA 40%, ectatic, RPLS subtotal, EF 49%, EDP 8mmHg, procedure done via R radial access with hemostasis achieved by R radial TR band. Pt. seen and examined at bedside this morning, without complaints and is out of bed ambulating. R radial access site stable without bleeding or hematoma, distal pulses intact. Vital signs stable, labs and telemetry reviewed. Home medication regimen reviewed with Dr. Hathaway and patient is to continue taking lipitor 40 mg QD, ramipril 10 mg QD, jardiance 10 mg QD, janumet 50 mg-1000 mg BID (restart 6/18/21), tolterodine 4 mg QD, flomax 0.4 mg QD, aspirin 81 mg QD and plavix 75 mg QD (new). Pt. stable for discharge as per Dr. Hathaway and to f/u with Dr. Solis in 1-2 weeks. Patient has been given appropriate discharge instructions including medication regimen, access site management and follow up. Prescriptions have been e-prescribed to patient's preferred pharmacy.     71yo German/English speaking M w/ PMHx HTN, HLD, CAD s/p PCI (STEVEN dLAD, STEVEN pLCx 6/12/18 @ [facility]), DM2 with neuropathy and BPH who initially presented his cardiologist Dr. Solis c/o lightheadedness/dizziness, fatigue and ALFONSO w/ 3 city blocks, resolving with rest, over past 4 months. NST 6/4/21: EF 49%, large size reversible perfusion defect of moderate-severe intensity involving the entire (basal-apical) inferior/inferolateral myocardial walls suggestive of inducible myocardial ischemia of the posterior coronary circulation distribution; compared to the prior study dated 07/10/2020 the current study now reveals of inducible myocardial ischemia. Most recent Cardiac Cath 6/12/18 @ [facility]: dLAD 85% s/p STEVEN, pLCx 80% s/p STEVEN with residual mLAD 40%, D1 40%, mRCA 40%, dRCA 50% (small vessel distally).   In light of patient’s risk factors, known h/o CAD,  CCS II anginal equivalent symptoms, abnormal stress test, pt referred for cardiac catheterization with possible intervention to r/o progressively worsening CAD.   Pt now s/p cardiac cath 6/16/21 w/ STEVEN OM1 and revealing Lm normal, mLAD 30%, dLCx stent patent, mRCA 40% (ectatic), RPLS subtotal, EF 49%, EDP 8, access R radial TR band applied. Pt seen and examined at bedside this AM without any complaints or events overnight, VSS, labs and telemetry reviewed and pt stable for discharge as discussed with Dr. Hathaway. Pt has received appropriate discharge instructions, including medication regimen, access site management and follow up with Dr. Solis in 1-2 weeks.    Discharge medications: ASA 81mg QD, Plavix 75mg QD, Lipitor 40mg HS, Ramipril 10mg QD, Jardiance 10mg QD, Janumet 50-1000mg BID, Flomax 0.4mg QD, Tolterodine 4mg QD. 71yo Croatian/English speaking M w/ PMHx HTN, HLD, CAD s/p PCI (STEVEN dLAD, STEVEN pLCx 6/12/18 @ [facility]), DM2 with neuropathy and BPH who initially presented his cardiologist Dr. Solis c/o lightheadedness/dizziness, fatigue and ALFONSO w/ 3 city blocks, resolving with rest, over past 4 months. NST 6/4/21: EF 49%, large size reversible perfusion defect of moderate-severe intensity involving the entire (basal-apical) inferior/inferolateral myocardial walls suggestive of inducible myocardial ischemia of the posterior coronary circulation distribution; compared to the prior study dated 07/10/2020 the current study now reveals of inducible myocardial ischemia. Most recent Cardiac Cath 6/12/18 @ [facility]: dLAD 85% s/p STEVEN, pLCx 80% s/p STEVEN with residual mLAD 40%, D1 40%, mRCA 40%, dRCA 50% (small vessel distally).   In light of patient’s risk factors, known h/o CAD,  CCS II anginal equivalent symptoms, abnormal stress test, pt referred for cardiac catheterization with possible intervention to r/o progressively worsening CAD.   Pt now s/p cardiac cath 6/16/21 w/ STEVEN OM1 and revealing Lm normal, mLAD 30%, dLCx stent patent, mRCA 40% (ectatic), RPLS subtotal, EF 49%, EDP 8, access R radial TR band applied. Pt seen and examined at bedside this AM without any complaints or events overnight, VSS, labs and telemetry reviewed and pt stable for discharge as discussed with Dr. Hathaway. Pt has received appropriate discharge instructions, including medication regimen, access site management and follow up with Dr. Solis in 1-2 weeks.    Discharge medications: ASA 81mg QD, Plavix 75mg QD, Lipitor 40mg HS, Ramipril 10mg QD, Jardiance 10mg QD, Janumet 50-1000mg BID, Flomax 0.4mg QD, Tolterodine 4mg QD.   Cardiac Rehab (Post PCI):  Education on benefits of Cardiac Rehab provided to patient, Referral and Prescription Given for Cardiac Rehab, Pt given list of locations & instructed to contact their insurance company to review list of participating providers

## 2021-06-16 NOTE — DISCHARGE NOTE PROVIDER - NSDCCPCAREPLAN_GEN_ALL_CORE_FT
PRINCIPAL DISCHARGE DIAGNOSIS  Diagnosis: CAD (coronary artery disease)  Assessment and Plan of Treatment: You underwent a cardiac catheterization 6/16/21 and the blockage in your obtuse marginal 1 artery was opened with stent placement. It is very important to take aspirin 81 mg once a day and plavix 75 mg once a day, NEVER MISS A DOSE OF ASPIRIN OR PLAVIX; IF YOU DO, YOU ARE AT RISK OF YOUR STENT CLOSING AND HAVING A HEART ATTACK. DO NOT STOP THESE TWO MEDICATIONS UNLESS INSTRUCTED TO DO SO BY YOUR CARDIOLOGIST. Your procedure was done through your wrist. You do not need to keep this area covered and you may shower. Please avoid any heavy lifting  (no more than 3 to 5 lbs) or strenuous activity for five days. If you develop any swelling, bleeding, hardening of the skin (hematoma formation), acute pain, numbness/tingling in your arm please contact your doctor immediately or call our 24/7 line: [phone] Please return to the hospital/seek immediate medical attention if worsening of symptoms- including not limited to chest pain, shortness of breath. Please follow up with Dr. Solis in 1-2 weeks. Please call his office and make an appointment to see him. Please continue a heart healthy diet low in sodium, cholesterol, and fat.  We have provided you with a prescription for cardiac rehab which is medically supervised exercise program for your heart and has been shown to improve the quantity and quality of life of people with heart disease like yours. You should attend cardiac rehab 3 times per week for 12 weeks. We have provided you with a list of nearby facilities. Please call your insurance carrier to determine which of these facilities are covered under your plan. Please bring this prescription with you to your follow up appointment with your cardiologist who can then further assist you to enroll into a cardiac rehab program.        SECONDARY DISCHARGE DIAGNOSES  Diagnosis: HTN (hypertension)  Assessment and Plan of Treatment: You have a history of elevated blood pressure and you should continue your blood pressure medications as prescribed - ramipril 10 mg once a day.       Diagnosis: Hyperlipidemia  Assessment and Plan of Treatment: Please continue your lipitor 40 mg once a day to ensure your cardiac stent remains open.      Diagnosis: Diabetes  Assessment and Plan of Treatment: You have a diagnosis of diabetes and should continue all of your diabetic medications as prescribed- jardiance 10 mg once a day, janumet 50 mg-1000 mg twice a day. Please do not take your janumet for 2 days to prevent interaction with the contrast you recieved, restart janumet on 6/18/21 (friday).     PRINCIPAL DISCHARGE DIAGNOSIS  Diagnosis: CAD (coronary artery disease)  Assessment and Plan of Treatment: You underwent a cardiac catheterization 6/16/21 and the blockage in your obtuse marginal 1 artery was opened with stent placement. It is very important to take aspirin 81 mg once a day and plavix 75 mg once a day, NEVER MISS A DOSE OF ASPIRIN OR PLAVIX; IF YOU DO, YOU ARE AT RISK OF YOUR STENT CLOSING AND HAVING A HEART ATTACK. DO NOT STOP THESE TWO MEDICATIONS UNLESS INSTRUCTED TO DO SO BY YOUR CARDIOLOGIST. Your procedure was done through your wrist. You do not need to keep this area covered and you may shower. Please avoid any heavy lifting  (no more than 3 to 5 lbs) or strenuous activity for five days. If you develop any swelling, bleeding, hardening of the skin (hematoma formation), acute pain, numbness/tingling in your arm please contact your doctor immediately or call our 24/7 line: [phone] Please return to the hospital/seek immediate medical attention if worsening of symptoms- including not limited to chest pain, shortness of breath. Please follow up with Dr. Solis in 1-2 weeks. Please call his office and make an appointment to see him. Please continue a heart healthy diet low in sodium, cholesterol, and fat.  We have provided you with a prescription for cardiac rehab which is medically supervised exercise program for your heart and has been shown to improve the quantity and quality of life of people with heart disease like yours. You should attend cardiac rehab 3 times per week for 12 weeks. We have provided you with a list of nearby facilities. Please call your insurance carrier to determine which of these facilities are covered under your plan. Please bring this prescription with you to your follow up appointment with your cardiologist who can then further assist you to enroll into a cardiac rehab program.        SECONDARY DISCHARGE DIAGNOSES  Diagnosis: HTN (hypertension)  Assessment and Plan of Treatment: You have a history of elevated blood pressure and you should continue your blood pressure medications as prescribed - ramipril 10 mg once a day.       Diagnosis: Hyperlipidemia  Assessment and Plan of Treatment: Please continue your lipitor 40 mg once a day to ensure your cardiac stent remains open.      Diagnosis: Diabetes  Assessment and Plan of Treatment: Your hemoglobin A1c is 9.6 which makes you diabetic, your goal is less than 7. A normal hemoglobin A1c is less than 5.7, prediabetes is 5.7-6.4, diabetes is 6.5 and up. Your hemoglobin A1c measures your average blood sugar over 3 months. It is very important to maintain a low carbohydrate diet (less bread, rice, pasta, starches, soda) and increase your activity as tolerated.   Please continue all of your diabetic medications as prescribed- jardiance 10 mg once a day, janumet 50 mg-1000 mg twice a day. Please do not take your janumet for 2 days to prevent interaction with the contrast you recieved, restart janumet on 6/18/21 (friday).     PRINCIPAL DISCHARGE DIAGNOSIS  Diagnosis: CAD (coronary artery disease)  Assessment and Plan of Treatment: You were found to have blockages in the arteries of your heart, also known as Coronary Artery Disease. You underwent a cardiac catheterization on 6/16/21 and received a stent to the obtuse marginal artery.  PLEASE CONTINUE ASPIRIN 81MG DAILY AND PLAVIX 75MG DAILY. DO NOT STOP THESE MEDICATIONS FOR ANY REASON AS THEY ARE KEEPING YOUR STENT OPEN AND PREVENTING A HEART ATTACK.   Avoid strenuous activity or heavy lifting anything more than 5lbs for the next five days. Do not take a bath or swim for the next five days; you may shower. For any bleeding or hematoma formation (hardened blood collection under the skin) at the access site of your right wrist please hold pressure and go to the emergency room. Please follow up with Dr. Solis in 1-2 weeks. For recurrent chest pain, please call your doctor or go to the emergency room.      SECONDARY DISCHARGE DIAGNOSES  Diagnosis: HTN (hypertension)  Assessment and Plan of Treatment: Please continue RAMIPRIL 10MG DAILY as listed to keep your blood pressure controlled. For blood pressure that is too high or too low please see your doctor or go to the emergency room as necessary.    Diagnosis: Hyperlipidemia  Assessment and Plan of Treatment: Please continue ATORVASTATIN 40MG at bedtime to keep your cholesterol low. High cholesterol contributes to heart disease.    Diagnosis: Diabetes  Assessment and Plan of Treatment: Your Hemoglobin A1c is 9.6% and your goal A1c is less than 7.0%. This number measures your average blood sugar level over the last three months.  Please continue JARDIANCE 10MG DAILY, JANUMET 50-1000MG TWICE DAILY as listed for diabetes. Maintain a low carbohydrate, low sugar diet, exercise, monitor your fingerstick blood sugars regarly and follow up with your Endocrinologist/Primary Care Doctor.     PRINCIPAL DISCHARGE DIAGNOSIS  Diagnosis: CAD (coronary artery disease)  Assessment and Plan of Treatment: You were found to have blockages in the arteries of your heart, also known as Coronary Artery Disease. You underwent a cardiac catheterization on 6/16/21 and received a stent to the obtuse marginal artery.  PLEASE CONTINUE ASPIRIN 81MG DAILY AND PLAVIX 75MG DAILY. DO NOT STOP THESE MEDICATIONS FOR ANY REASON AS THEY ARE KEEPING YOUR STENT OPEN AND PREVENTING A HEART ATTACK.   Avoid strenuous activity or heavy lifting anything more than 5lbs for the next five days. Do not take a bath or swim for the next five days; you may shower. For any bleeding or hematoma formation (hardened blood collection under the skin) at the access site of your right wrist please hold pressure and go to the emergency room. Please follow up with Dr. Solis in 1-2 weeks. For recurrent chest pain, please call your doctor or go to the emergency room.      SECONDARY DISCHARGE DIAGNOSES  Diagnosis: HTN (hypertension)  Assessment and Plan of Treatment: Please continue RAMIPRIL 10MG DAILY as listed to keep your blood pressure controlled. For blood pressure that is too high or too low please see your doctor or go to the emergency room as necessary.    Diagnosis: Hyperlipidemia  Assessment and Plan of Treatment: Please continue ATORVASTATIN 40MG at bedtime to keep your cholesterol low. High cholesterol contributes to heart disease.    Diagnosis: Diabetes  Assessment and Plan of Treatment: Your Hemoglobin A1c is 9.6% and your goal A1c is less than 7.0%. This number measures your average blood sugar level over the last three months.  Please continue JARDIANCE 10MG DAILY, JANUMET 50-1000MG TWICE DAILY (RESTART ON 6/19/21) as listed for diabetes. Maintain a low carbohydrate, low sugar diet, exercise, monitor your fingerstick blood sugars regarly and follow up with your Endocrinologist/Primary Care Doctor.

## 2021-06-17 ENCOUNTER — TRANSCRIPTION ENCOUNTER (OUTPATIENT)
Age: 71
End: 2021-06-17

## 2021-06-17 VITALS
DIASTOLIC BLOOD PRESSURE: 70 MMHG | HEART RATE: 67 BPM | SYSTOLIC BLOOD PRESSURE: 133 MMHG | OXYGEN SATURATION: 96 % | RESPIRATION RATE: 16 BRPM

## 2021-06-17 LAB
A1C WITH ESTIMATED AVERAGE GLUCOSE RESULT: 9.6 % — HIGH (ref 4–5.6)
ALBUMIN SERPL ELPH-MCNC: 3.6 G/DL — SIGNIFICANT CHANGE UP (ref 3.3–5)
ALP SERPL-CCNC: 89 U/L — SIGNIFICANT CHANGE UP (ref 40–120)
ALT FLD-CCNC: 7 U/L — LOW (ref 10–45)
ANION GAP SERPL CALC-SCNC: 10 MMOL/L — SIGNIFICANT CHANGE UP (ref 5–17)
AST SERPL-CCNC: 8 U/L — LOW (ref 10–40)
BILIRUB SERPL-MCNC: 0.6 MG/DL — SIGNIFICANT CHANGE UP (ref 0.2–1.2)
BUN SERPL-MCNC: 12 MG/DL — SIGNIFICANT CHANGE UP (ref 7–23)
CALCIUM SERPL-MCNC: 9.1 MG/DL — SIGNIFICANT CHANGE UP (ref 8.4–10.5)
CHLORIDE SERPL-SCNC: 103 MMOL/L — SIGNIFICANT CHANGE UP (ref 96–108)
CO2 SERPL-SCNC: 28 MMOL/L — SIGNIFICANT CHANGE UP (ref 22–31)
COVID-19 SPIKE DOMAIN AB INTERP: POSITIVE
COVID-19 SPIKE DOMAIN ANTIBODY RESULT: >250 U/ML — HIGH
CREAT SERPL-MCNC: 0.75 MG/DL — SIGNIFICANT CHANGE UP (ref 0.5–1.3)
ESTIMATED AVERAGE GLUCOSE: 229 MG/DL — HIGH (ref 68–114)
GLUCOSE BLDC GLUCOMTR-MCNC: 167 MG/DL — HIGH (ref 70–99)
GLUCOSE BLDC GLUCOMTR-MCNC: 173 MG/DL — HIGH (ref 70–99)
GLUCOSE SERPL-MCNC: 180 MG/DL — HIGH (ref 70–99)
HCT VFR BLD CALC: 46.3 % — SIGNIFICANT CHANGE UP (ref 39–50)
HCV AB S/CO SERPL IA: 107.5 S/CO — HIGH
HCV AB SERPL-IMP: REACTIVE
HGB BLD-MCNC: 14.7 G/DL — SIGNIFICANT CHANGE UP (ref 13–17)
MAGNESIUM SERPL-MCNC: 1.9 MG/DL — SIGNIFICANT CHANGE UP (ref 1.6–2.6)
MCHC RBC-ENTMCNC: 26.2 PG — LOW (ref 27–34)
MCHC RBC-ENTMCNC: 31.7 GM/DL — LOW (ref 32–36)
MCV RBC AUTO: 82.4 FL — SIGNIFICANT CHANGE UP (ref 80–100)
NRBC # BLD: 0 /100 WBCS — SIGNIFICANT CHANGE UP (ref 0–0)
PLATELET # BLD AUTO: 211 K/UL — SIGNIFICANT CHANGE UP (ref 150–400)
POTASSIUM SERPL-MCNC: 4.4 MMOL/L — SIGNIFICANT CHANGE UP (ref 3.5–5.3)
POTASSIUM SERPL-SCNC: 4.4 MMOL/L — SIGNIFICANT CHANGE UP (ref 3.5–5.3)
PROT SERPL-MCNC: 7.6 G/DL — SIGNIFICANT CHANGE UP (ref 6–8.3)
RBC # BLD: 5.62 M/UL — SIGNIFICANT CHANGE UP (ref 4.2–5.8)
RBC # FLD: 14.6 % — HIGH (ref 10.3–14.5)
SARS-COV-2 IGG+IGM SERPL QL IA: >250 U/ML — HIGH
SARS-COV-2 IGG+IGM SERPL QL IA: POSITIVE
SODIUM SERPL-SCNC: 141 MMOL/L — SIGNIFICANT CHANGE UP (ref 135–145)
WBC # BLD: 6.91 K/UL — SIGNIFICANT CHANGE UP (ref 3.8–10.5)
WBC # FLD AUTO: 6.91 K/UL — SIGNIFICANT CHANGE UP (ref 3.8–10.5)

## 2021-06-17 PROCEDURE — C1894: CPT

## 2021-06-17 PROCEDURE — 86803 HEPATITIS C AB TEST: CPT

## 2021-06-17 PROCEDURE — 82553 CREATINE MB FRACTION: CPT

## 2021-06-17 PROCEDURE — 99239 HOSP IP/OBS DSCHRG MGMT >30: CPT

## 2021-06-17 PROCEDURE — 36415 COLL VENOUS BLD VENIPUNCTURE: CPT

## 2021-06-17 PROCEDURE — C1769: CPT

## 2021-06-17 PROCEDURE — 85730 THROMBOPLASTIN TIME PARTIAL: CPT

## 2021-06-17 PROCEDURE — C1887: CPT

## 2021-06-17 PROCEDURE — 83036 HEMOGLOBIN GLYCOSYLATED A1C: CPT

## 2021-06-17 PROCEDURE — 83735 ASSAY OF MAGNESIUM: CPT

## 2021-06-17 PROCEDURE — 85610 PROTHROMBIN TIME: CPT

## 2021-06-17 PROCEDURE — C1874: CPT

## 2021-06-17 PROCEDURE — 80061 LIPID PANEL: CPT

## 2021-06-17 PROCEDURE — 85027 COMPLETE CBC AUTOMATED: CPT

## 2021-06-17 PROCEDURE — 82962 GLUCOSE BLOOD TEST: CPT

## 2021-06-17 PROCEDURE — 80053 COMPREHEN METABOLIC PANEL: CPT

## 2021-06-17 PROCEDURE — 85025 COMPLETE CBC W/AUTO DIFF WBC: CPT

## 2021-06-17 PROCEDURE — 82550 ASSAY OF CK (CPK): CPT

## 2021-06-17 PROCEDURE — 87521 HEPATITIS C PROBE&RVRS TRNSC: CPT

## 2021-06-17 PROCEDURE — 86769 SARS-COV-2 COVID-19 ANTIBODY: CPT

## 2021-06-17 PROCEDURE — 93005 ELECTROCARDIOGRAM TRACING: CPT

## 2021-06-17 PROCEDURE — C1725: CPT

## 2021-06-17 RX ORDER — ASPIRIN/CALCIUM CARB/MAGNESIUM 324 MG
1 TABLET ORAL
Qty: 0 | Refills: 0 | DISCHARGE

## 2021-06-17 RX ORDER — RAMIPRIL 5 MG
1 CAPSULE ORAL
Qty: 0 | Refills: 0 | DISCHARGE

## 2021-06-17 RX ORDER — ATORVASTATIN CALCIUM 80 MG/1
1 TABLET, FILM COATED ORAL
Qty: 30 | Refills: 2
Start: 2021-06-17 | End: 2021-09-14

## 2021-06-17 RX ORDER — RAMIPRIL 5 MG
1 CAPSULE ORAL
Qty: 30 | Refills: 2
Start: 2021-06-17 | End: 2021-09-14

## 2021-06-17 RX ORDER — CLOPIDOGREL BISULFATE 75 MG/1
1 TABLET, FILM COATED ORAL
Qty: 30 | Refills: 11
Start: 2021-06-17 | End: 2022-06-11

## 2021-06-17 RX ORDER — SITAGLIPTIN AND METFORMIN HYDROCHLORIDE 500; 50 MG/1; MG/1
1 TABLET, FILM COATED ORAL
Qty: 0 | Refills: 0 | DISCHARGE

## 2021-06-17 RX ORDER — ASPIRIN/CALCIUM CARB/MAGNESIUM 324 MG
1 TABLET ORAL
Qty: 30 | Refills: 11
Start: 2021-06-17 | End: 2022-06-11

## 2021-06-17 RX ADMIN — Medication 1: at 12:09

## 2021-06-17 RX ADMIN — CLOPIDOGREL BISULFATE 75 MILLIGRAM(S): 75 TABLET, FILM COATED ORAL at 06:33

## 2021-06-17 RX ADMIN — Medication 1: at 07:07

## 2021-06-17 RX ADMIN — Medication 81 MILLIGRAM(S): at 06:33

## 2021-06-17 RX ADMIN — Medication 5 MILLIGRAM(S): at 06:30

## 2021-06-17 NOTE — DISCHARGE NOTE NURSING/CASE MANAGEMENT/SOCIAL WORK - PATIENT PORTAL LINK FT
You can access the FollowMyHealth Patient Portal offered by [facility] by registering at the following website: [email or web address]. By joining [email or web address] FollowMyHealth portal, you will also be able to view your health information using other applications (apps) compatible with our system.

## 2021-06-20 LAB — HCV RNA FLD QL NAA+PROBE: SIGNIFICANT CHANGE UP

## 2021-06-22 DIAGNOSIS — R76.0 RAISED ANTIBODY TITER: ICD-10-CM

## 2021-06-22 DIAGNOSIS — E11.40 TYPE 2 DIABETES MELLITUS WITH DIABETIC NEUROPATHY, UNSPECIFIED: ICD-10-CM

## 2021-06-22 DIAGNOSIS — I10 ESSENTIAL (PRIMARY) HYPERTENSION: ICD-10-CM

## 2021-06-22 DIAGNOSIS — Z79.82 LONG TERM (CURRENT) USE OF ASPIRIN: ICD-10-CM

## 2021-06-22 DIAGNOSIS — Z95.5 PRESENCE OF CORONARY ANGIOPLASTY IMPLANT AND GRAFT: ICD-10-CM

## 2021-06-22 DIAGNOSIS — Z87.891 PERSONAL HISTORY OF NICOTINE DEPENDENCE: ICD-10-CM

## 2021-06-22 DIAGNOSIS — I25.118 ATHEROSCLEROTIC HEART DISEASE OF NATIVE CORONARY ARTERY WITH OTHER FORMS OF ANGINA PECTORIS: ICD-10-CM

## 2021-06-22 DIAGNOSIS — N40.0 BENIGN PROSTATIC HYPERPLASIA WITHOUT LOWER URINARY TRACT SYMPTOMS: ICD-10-CM

## 2021-06-22 DIAGNOSIS — E78.5 HYPERLIPIDEMIA, UNSPECIFIED: ICD-10-CM

## 2021-10-12 NOTE — DISCHARGE NOTE PROVIDER - NSDCQMPCI_CARD_ALL_CORE
Impression: Other abnormal glucose: R73.09. Plan: Per patient she is not taking any medication for diabetes at this time. No evidence of diabetic retinopathy or diabetic macular edema. Discussed ocular and systemic benefits of blood sugar control. Stressed importance of yearly diabetic eye exams. No Yes...

## 2022-02-28 NOTE — H&P ADULT - AS O2 DELIVERY
From: Nannette Barragan  To: Radha Butcher  Sent: 2/28/2022 3:34 PM CST  Subject: Upcoming Appointment    [facility]    I wanted to email you to give you a heads up of what’s going on with me before our appointment this month. I have gained 20 pounds since November. On top of this, I’m also experiencing that same fatigue that we did tests for before. I did start a medication called Rexulti in November & it can cause insulin resistance issues. I’m wondering if the new medication on top of my PCOS is causing insulin resistance issues like the weight gain & the fatigue. I am wondering if we can look into having me try Metformin to help combat some of these issues. I have also heard of other people with PCOS trying Ozempic as well. Just wanted to give you a heads up so we can hopefully find a solution during my appointment.    Thank you for your time,  Nannette   room air

## 2022-06-10 VITALS
WEIGHT: 210.1 LBS | HEART RATE: 81 BPM | HEIGHT: 67 IN | RESPIRATION RATE: 16 BRPM | SYSTOLIC BLOOD PRESSURE: 168 MMHG | DIASTOLIC BLOOD PRESSURE: 87 MMHG | TEMPERATURE: 98 F | OXYGEN SATURATION: 97 %

## 2022-06-10 RX ORDER — CHLORHEXIDINE GLUCONATE 213 G/1000ML
1 SOLUTION TOPICAL ONCE
Refills: 0 | Status: DISCONTINUED | OUTPATIENT
Start: 2022-06-15 | End: 2022-06-16

## 2022-06-10 NOTE — H&P ADULT - ASSESSMENT
70 yo Wolof/English speaking M w/ PMHx HTN, HLD, CAD s/p multiple PCI’s (STEVEN OM1 @ [facility] 6/16/21), DM2 with neuropathy and BPH who presented today to [facility] for recommended cardiac cath with possible intervention in light of pt's risk factors, CCS 3 anginal symptoms, abnormal NST and prior history of CAD.    ASA III and Mallampati III    OF NOTE:  Pt took ASA 81 mg PO x1 , missed one dose of plavix yesterday, will load pt with Plavix 150 mg PO x1 prior to his procedure.    pt was consented and the procedure was explained with the help [employer] [id number].    medication list was confirmed with the pt and pt's pharmacy    Risks & benefits of procedure and alternative therapy have been explained to the patient including but not limited to: allergic reaction, bleeding w/possible need for blood transfusion, infection, renal and vascular compromise, limb damage, arrhythmia, stroke, vessel dissection/perforation, Myocardial infarction, emergent CABG. Informed consent obtained and in chart.

## 2022-06-10 NOTE — H&P ADULT - HISTORY OF PRESENT ILLNESS
COVID:   Cardiologist: Dr. Solis    Pharmacy:   Escort:        No office note sent          71yo Belarusian/English speaking M w/ PMHx HTN, HLD, CAD s/p multiple PCI’s (STEVEN OM1 @ [facility] 6/16/21), DM2 with neuropathy and BPH who presented to cardiologist Dr. Solis with reports of _____. Pt denies fever, chills, cough, CP, palpitations, SOB, PND/orthopnea, LE edema, abdominal pain, N/V/D, dizziness, syncope. NST 5/13/22: medium-large size reversible perfusion defect of moderate intensity involving the entire (Basal-apical) inferior/inferolateral myocardial walls suggestive of inducible myocardial ischemia in the posterior coronary circulation, EF 55%. In light of patient’s risk factors, known h/o CAD, CCS ___ anginal equivalent symptoms, abnormal stress test, pt referred for cardiac catheterization with possible intervention if clinically indicated.           Cath history:     -Cardiac cath 6/16/21 w/ STEVEN OM1 and revealing LM normal, mLAD 30%, dLCx stent patent, mRCA 40% (ectatic), RPLS subtotal, EF 49%, EDP 8, access R radial TR band applied.      - Cardiac Cath 6/12/18 @ [facility]: dLAD 85% s/p STEVEN, pLCx 80% s/p STEVEN with residual mLAD 40%, D1 40%, mRCA 40%, dRCA 50% (small vessel distally).  COVID: 6/13/22 at Dr. Solis's office  Cardiologist: Dr. Solis    Pharmacy:   Escort:        71yo Bengali/English speaking M w/ PMHx HTN, HLD, CAD s/p multiple PCI’s (STEVEN OM1 @ [facility] 6/16/21), DM2 with neuropathy and BPH who presented to cardiologist Dr. Solis with reports of exertional chest pain and SOB. Pt denies fever, chills, cough, palpitations,  PND/orthopnea, LE edema, abdominal pain, N/V/D, dizziness, syncope. NST 5/13/22: medium-large size reversible perfusion defect of moderate intensity involving the entire (Basal-apical) inferior/inferolateral myocardial walls suggestive of inducible myocardial ischemia in the posterior coronary circulation, EF 55%. In light of patient’s risk factors, known h/o CAD, CCS ___ anginal equivalent symptoms, abnormal stress test, pt referred for cardiac catheterization with possible intervention if clinically indicated.           Cath history:     -Cardiac cath 6/16/21 w/ STEVEN OM1 and revealing LM normal, mLAD 30%, dLCx stent patent, mRCA 40% (ectatic), RPLS subtotal, EF 49%, EDP 8, access R radial TR band applied.      - Cardiac Cath 6/12/18 @ [facility]: dLAD 85% s/p STEVEN, pLCx 80% s/p STEVEN with residual mLAD 40%, D1 40%, mRCA 40%, dRCA 50% (small vessel distally).  COVID: 6/13/22 at Dr. Solis's office neg on HIE   Cardiologist: Dr. Solis    Pharmacy: [facility] pharmacy   Escort: son        70 yo Turkmen/English speaking M w/ PMHx HTN, HLD, CAD s/p multiple PCI’s (STEVEN OM1 @ [facility] 6/16/21), DM2 with neuropathy and BPH who presented to cardiologist Dr. Solis with reports of exertional chest pain and SOB with minimal exertion . Pt denies fever, chills, cough, palpitations,  PND/orthopnea, LE edema, abdominal pain, N/V/D, dizziness, syncope. NST 5/13/22: medium-large size reversible perfusion defect of moderate intensity involving the entire (Basal-apical) inferior/inferolateral myocardial walls suggestive of inducible myocardial ischemia in the posterior coronary circulation, EF 55%. In light of patient’s risk factors, known h/o CAD, CCS 3 anginal equivalent symptoms, abnormal stress test, pt referred for cardiac catheterization with possible intervention if clinically indicated.           Cath history:     -Cardiac cath 6/16/21 w/ STEVEN OM1 and revealing LM normal, mLAD 30%, dLCx stent patent, mRCA 40% (ectatic), RPLS subtotal, EF 49%, EDP 8, access R radial TR band applied.      - Cardiac Cath 6/12/18 @ [facility]: dLAD 85% s/p STEVEN, pLCx 80% s/p STEVEN with residual mLAD 40%, D1 40%, mRCA 40%, dRCA 50% (small vessel distally).

## 2022-06-15 ENCOUNTER — INPATIENT (INPATIENT)
Facility: HOSPITAL | Age: 72
LOS: 0 days | Discharge: HOME | DRG: 247 | End: 2022-06-16
Attending: INTERNAL MEDICINE | Admitting: INTERNAL MEDICINE
Payer: MEDICARE

## 2022-06-15 LAB
A1C WITH ESTIMATED AVERAGE GLUCOSE RESULT: 8.9 % — HIGH (ref 4–5.6)
ALBUMIN SERPL ELPH-MCNC: 3.8 G/DL — SIGNIFICANT CHANGE UP (ref 3.3–5)
ALP SERPL-CCNC: 90 U/L — SIGNIFICANT CHANGE UP (ref 40–120)
ALT FLD-CCNC: 13 U/L — SIGNIFICANT CHANGE UP (ref 10–45)
ANION GAP SERPL CALC-SCNC: 13 MMOL/L — SIGNIFICANT CHANGE UP (ref 5–17)
APTT BLD: 47.3 SEC — HIGH (ref 27.5–35.5)
AST SERPL-CCNC: 15 U/L — SIGNIFICANT CHANGE UP (ref 10–40)
BASOPHILS # BLD AUTO: 0.02 K/UL — SIGNIFICANT CHANGE UP (ref 0–0.2)
BASOPHILS NFR BLD AUTO: 0.3 % — SIGNIFICANT CHANGE UP (ref 0–2)
BILIRUB SERPL-MCNC: 1.6 MG/DL — HIGH (ref 0.2–1.2)
BUN SERPL-MCNC: 11 MG/DL — SIGNIFICANT CHANGE UP (ref 7–23)
CALCIUM SERPL-MCNC: 9 MG/DL — SIGNIFICANT CHANGE UP (ref 8.4–10.5)
CHLORIDE SERPL-SCNC: 102 MMOL/L — SIGNIFICANT CHANGE UP (ref 96–108)
CHOLEST SERPL-MCNC: 127 MG/DL — SIGNIFICANT CHANGE UP
CK MB CFR SERPL CALC: 2.2 NG/ML — SIGNIFICANT CHANGE UP (ref 0–6.7)
CK SERPL-CCNC: 50 U/L — SIGNIFICANT CHANGE UP (ref 30–200)
CO2 SERPL-SCNC: 25 MMOL/L — SIGNIFICANT CHANGE UP (ref 22–31)
CREAT SERPL-MCNC: 0.61 MG/DL — SIGNIFICANT CHANGE UP (ref 0.5–1.3)
EGFR: 104 ML/MIN/1.73M2 — SIGNIFICANT CHANGE UP
EOSINOPHIL # BLD AUTO: 0.05 K/UL — SIGNIFICANT CHANGE UP (ref 0–0.5)
EOSINOPHIL NFR BLD AUTO: 0.7 % — SIGNIFICANT CHANGE UP (ref 0–6)
ESTIMATED AVERAGE GLUCOSE: 209 MG/DL — HIGH (ref 68–114)
GLUCOSE BLDC GLUCOMTR-MCNC: 119 MG/DL — HIGH (ref 70–99)
GLUCOSE BLDC GLUCOMTR-MCNC: 193 MG/DL — HIGH (ref 70–99)
GLUCOSE SERPL-MCNC: 208 MG/DL — HIGH (ref 70–99)
HCT VFR BLD CALC: 41.7 % — SIGNIFICANT CHANGE UP (ref 39–50)
HDLC SERPL-MCNC: 47 MG/DL — SIGNIFICANT CHANGE UP
HGB BLD-MCNC: 13.7 G/DL — SIGNIFICANT CHANGE UP (ref 13–17)
IMM GRANULOCYTES NFR BLD AUTO: 0.4 % — SIGNIFICANT CHANGE UP (ref 0–1.5)
INR BLD: 0.98 — SIGNIFICANT CHANGE UP (ref 0.88–1.16)
LIPID PNL WITH DIRECT LDL SERPL: 43 MG/DL — SIGNIFICANT CHANGE UP
LYMPHOCYTES # BLD AUTO: 2.07 K/UL — SIGNIFICANT CHANGE UP (ref 1–3.3)
LYMPHOCYTES # BLD AUTO: 28.4 % — SIGNIFICANT CHANGE UP (ref 13–44)
MCHC RBC-ENTMCNC: 27 PG — SIGNIFICANT CHANGE UP (ref 27–34)
MCHC RBC-ENTMCNC: 32.9 GM/DL — SIGNIFICANT CHANGE UP (ref 32–36)
MCV RBC AUTO: 82.2 FL — SIGNIFICANT CHANGE UP (ref 80–100)
MONOCYTES # BLD AUTO: 0.52 K/UL — SIGNIFICANT CHANGE UP (ref 0–0.9)
MONOCYTES NFR BLD AUTO: 7.1 % — SIGNIFICANT CHANGE UP (ref 2–14)
NEUTROPHILS # BLD AUTO: 4.61 K/UL — SIGNIFICANT CHANGE UP (ref 1.8–7.4)
NEUTROPHILS NFR BLD AUTO: 63.1 % — SIGNIFICANT CHANGE UP (ref 43–77)
NON HDL CHOLESTEROL: 80 MG/DL — SIGNIFICANT CHANGE UP
NRBC # BLD: 0 /100 WBCS — SIGNIFICANT CHANGE UP (ref 0–0)
PLATELET # BLD AUTO: 191 K/UL — SIGNIFICANT CHANGE UP (ref 150–400)
POTASSIUM SERPL-MCNC: 4.3 MMOL/L — SIGNIFICANT CHANGE UP (ref 3.5–5.3)
POTASSIUM SERPL-SCNC: 4.3 MMOL/L — SIGNIFICANT CHANGE UP (ref 3.5–5.3)
PROT SERPL-MCNC: 6.9 G/DL — SIGNIFICANT CHANGE UP (ref 6–8.3)
PROTHROM AB SERPL-ACNC: 11.7 SEC — SIGNIFICANT CHANGE UP (ref 10.5–13.4)
RBC # BLD: 5.07 M/UL — SIGNIFICANT CHANGE UP (ref 4.2–5.8)
RBC # FLD: 14.5 % — SIGNIFICANT CHANGE UP (ref 10.3–14.5)
SODIUM SERPL-SCNC: 140 MMOL/L — SIGNIFICANT CHANGE UP (ref 135–145)
TRIGL SERPL-MCNC: 183 MG/DL — HIGH
WBC # BLD: 7.3 K/UL — SIGNIFICANT CHANGE UP (ref 3.8–10.5)
WBC # FLD AUTO: 7.3 K/UL — SIGNIFICANT CHANGE UP (ref 3.8–10.5)

## 2022-06-15 PROCEDURE — 92928 PRQ TCAT PLMT NTRAC ST 1 LES: CPT | Mod: RC

## 2022-06-15 PROCEDURE — 99152 MOD SED SAME PHYS/QHP 5/>YRS: CPT

## 2022-06-15 PROCEDURE — 93458 L HRT ARTERY/VENTRICLE ANGIO: CPT | Mod: 26,XU

## 2022-06-15 RX ORDER — SODIUM CHLORIDE 9 MG/ML
1000 INJECTION INTRAMUSCULAR; INTRAVENOUS; SUBCUTANEOUS
Refills: 0 | Status: DISCONTINUED | OUTPATIENT
Start: 2022-06-15 | End: 2022-06-15

## 2022-06-15 RX ORDER — TAMSULOSIN HYDROCHLORIDE 0.4 MG/1
1 CAPSULE ORAL
Qty: 0 | Refills: 0 | DISCHARGE

## 2022-06-15 RX ORDER — ASPIRIN/CALCIUM CARB/MAGNESIUM 324 MG
81 TABLET ORAL DAILY
Refills: 0 | Status: DISCONTINUED | OUTPATIENT
Start: 2022-06-16 | End: 2022-06-16

## 2022-06-15 RX ORDER — LISINOPRIL 2.5 MG/1
40 TABLET ORAL DAILY
Refills: 0 | Status: DISCONTINUED | OUTPATIENT
Start: 2022-06-16 | End: 2022-06-16

## 2022-06-15 RX ORDER — GLIMEPIRIDE 1 MG
1 TABLET ORAL
Qty: 0 | Refills: 0 | DISCHARGE

## 2022-06-15 RX ORDER — EMPAGLIFLOZIN 10 MG/1
1 TABLET, FILM COATED ORAL
Qty: 0 | Refills: 0 | DISCHARGE

## 2022-06-15 RX ORDER — DEXTROSE 50 % IN WATER 50 %
25 SYRINGE (ML) INTRAVENOUS ONCE
Refills: 0 | Status: DISCONTINUED | OUTPATIENT
Start: 2022-06-15 | End: 2022-06-16

## 2022-06-15 RX ORDER — SODIUM CHLORIDE 9 MG/ML
1000 INJECTION, SOLUTION INTRAVENOUS
Refills: 0 | Status: DISCONTINUED | OUTPATIENT
Start: 2022-06-15 | End: 2022-06-16

## 2022-06-15 RX ORDER — TAMSULOSIN HYDROCHLORIDE 0.4 MG/1
0.4 CAPSULE ORAL AT BEDTIME
Refills: 0 | Status: DISCONTINUED | OUTPATIENT
Start: 2022-06-15 | End: 2022-06-16

## 2022-06-15 RX ORDER — CLOPIDOGREL BISULFATE 75 MG/1
75 TABLET, FILM COATED ORAL DAILY
Refills: 0 | Status: DISCONTINUED | OUTPATIENT
Start: 2022-06-16 | End: 2022-06-16

## 2022-06-15 RX ORDER — SODIUM CHLORIDE 9 MG/ML
1000 INJECTION INTRAMUSCULAR; INTRAVENOUS; SUBCUTANEOUS
Refills: 0 | Status: DISCONTINUED | OUTPATIENT
Start: 2022-06-15 | End: 2022-06-16

## 2022-06-15 RX ORDER — SODIUM CHLORIDE 9 MG/ML
250 INJECTION INTRAMUSCULAR; INTRAVENOUS; SUBCUTANEOUS ONCE
Refills: 0 | Status: COMPLETED | OUTPATIENT
Start: 2022-06-15 | End: 2022-06-15

## 2022-06-15 RX ORDER — DEXTROSE 50 % IN WATER 50 %
15 SYRINGE (ML) INTRAVENOUS ONCE
Refills: 0 | Status: DISCONTINUED | OUTPATIENT
Start: 2022-06-15 | End: 2022-06-16

## 2022-06-15 RX ORDER — TOLTERODINE TARTRATE 1 MG/1
1 TABLET, FILM COATED ORAL
Qty: 0 | Refills: 0 | DISCHARGE

## 2022-06-15 RX ORDER — ATORVASTATIN CALCIUM 80 MG/1
40 TABLET, FILM COATED ORAL AT BEDTIME
Refills: 0 | Status: DISCONTINUED | OUTPATIENT
Start: 2022-06-15 | End: 2022-06-16

## 2022-06-15 RX ORDER — CLOPIDOGREL BISULFATE 75 MG/1
75 TABLET, FILM COATED ORAL ONCE
Refills: 0 | Status: COMPLETED | OUTPATIENT
Start: 2022-06-15 | End: 2022-06-15

## 2022-06-15 RX ORDER — INSULIN LISPRO 100/ML
VIAL (ML) SUBCUTANEOUS
Refills: 0 | Status: DISCONTINUED | OUTPATIENT
Start: 2022-06-15 | End: 2022-06-16

## 2022-06-15 RX ORDER — DEXTROSE 50 % IN WATER 50 %
12.5 SYRINGE (ML) INTRAVENOUS ONCE
Refills: 0 | Status: DISCONTINUED | OUTPATIENT
Start: 2022-06-15 | End: 2022-06-16

## 2022-06-15 RX ORDER — MECLIZINE HCL 12.5 MG
1 TABLET ORAL
Qty: 0 | Refills: 0 | DISCHARGE

## 2022-06-15 RX ORDER — GLUCAGON INJECTION, SOLUTION 0.5 MG/.1ML
1 INJECTION, SOLUTION SUBCUTANEOUS ONCE
Refills: 0 | Status: DISCONTINUED | OUTPATIENT
Start: 2022-06-15 | End: 2022-06-16

## 2022-06-15 RX ORDER — FINASTERIDE 5 MG/1
5 TABLET, FILM COATED ORAL DAILY
Refills: 0 | Status: DISCONTINUED | OUTPATIENT
Start: 2022-06-15 | End: 2022-06-16

## 2022-06-15 RX ORDER — GABAPENTIN 400 MG/1
2 CAPSULE ORAL
Qty: 0 | Refills: 0 | DISCHARGE

## 2022-06-15 RX ADMIN — CLOPIDOGREL BISULFATE 75 MILLIGRAM(S): 75 TABLET, FILM COATED ORAL at 12:54

## 2022-06-15 RX ADMIN — SODIUM CHLORIDE 250 MILLILITER(S): 9 INJECTION INTRAMUSCULAR; INTRAVENOUS; SUBCUTANEOUS at 12:23

## 2022-06-15 RX ADMIN — Medication 2: at 22:17

## 2022-06-15 RX ADMIN — ATORVASTATIN CALCIUM 40 MILLIGRAM(S): 80 TABLET, FILM COATED ORAL at 22:16

## 2022-06-15 RX ADMIN — TAMSULOSIN HYDROCHLORIDE 0.4 MILLIGRAM(S): 0.4 CAPSULE ORAL at 22:16

## 2022-06-15 NOTE — PROGRESS NOTE ADULT - SUBJECTIVE AND OBJECTIVE BOX
Interventional Cardiology Radial band Removal Note    Patient seen and examined at bedside resting comfortably, denies current acute complaints.    Right Radial access site TR band place, no hematoma, no bleed  Radial pulse: 2+    Hemostasis achieved with manual release of TR band and 10 minutes of manual compression    No  Vasovagal reaction.    Meds given: None    Right Radial access site soft, no hematoma, no bleed  Radial pulse: 2+    A/P:  s/p PTCA/STEVEN mRCA  -	continue to monitor  -	OOB as tolerated  -	Post Procedure Instructions given  -           Call [phone] if any access site issues.

## 2022-06-15 NOTE — PATIENT PROFILE ADULT - FALL HARM RISK - HARM RISK INTERVENTIONS

## 2022-06-15 NOTE — PATIENT PROFILE ADULT - HISTORY OF COVID-19 VACCINATION
Answers for HPI/ROS submitted by the patient on 4/29/2022  Onset: more than 1 month ago  Progression since onset: waxing and waning  Frequency: hourly  Cough characteristics: non-productive, productive of blood-tinged sputum, productive of purulent sputum  chest pain: No  chills: No  ear congestion: Yes  ear pain: Yes  fever: No  headaches: Yes  heartburn: No  hemoptysis: No  myalgias: No  nasal congestion: Yes  postnasal drip: Yes  rash: No  rhinorrhea: Yes  shortness of breath: No  sore throat: Yes  sweats: No  weight loss:  No  wheezing: Yes  Aggravated by: lying down Yes

## 2022-06-16 ENCOUNTER — TRANSCRIPTION ENCOUNTER (OUTPATIENT)
Age: 72
End: 2022-06-16

## 2022-06-16 VITALS
RESPIRATION RATE: 18 BRPM | SYSTOLIC BLOOD PRESSURE: 133 MMHG | DIASTOLIC BLOOD PRESSURE: 64 MMHG | OXYGEN SATURATION: 97 % | HEART RATE: 80 BPM

## 2022-06-16 LAB
ANION GAP SERPL CALC-SCNC: 9 MMOL/L — SIGNIFICANT CHANGE UP (ref 5–17)
BUN SERPL-MCNC: 11 MG/DL — SIGNIFICANT CHANGE UP (ref 7–23)
CALCIUM SERPL-MCNC: 8.8 MG/DL — SIGNIFICANT CHANGE UP (ref 8.4–10.5)
CHLORIDE SERPL-SCNC: 103 MMOL/L — SIGNIFICANT CHANGE UP (ref 96–108)
CO2 SERPL-SCNC: 26 MMOL/L — SIGNIFICANT CHANGE UP (ref 22–31)
CREAT SERPL-MCNC: 0.57 MG/DL — SIGNIFICANT CHANGE UP (ref 0.5–1.3)
EGFR: 106 ML/MIN/1.73M2 — SIGNIFICANT CHANGE UP
GLUCOSE BLDC GLUCOMTR-MCNC: 137 MG/DL — HIGH (ref 70–99)
GLUCOSE BLDC GLUCOMTR-MCNC: 212 MG/DL — HIGH (ref 70–99)
GLUCOSE SERPL-MCNC: 161 MG/DL — HIGH (ref 70–99)
HCT VFR BLD CALC: 40.8 % — SIGNIFICANT CHANGE UP (ref 39–50)
HGB BLD-MCNC: 13 G/DL — SIGNIFICANT CHANGE UP (ref 13–17)
MAGNESIUM SERPL-MCNC: 1.5 MG/DL — LOW (ref 1.6–2.6)
MCHC RBC-ENTMCNC: 26.1 PG — LOW (ref 27–34)
MCHC RBC-ENTMCNC: 31.9 GM/DL — LOW (ref 32–36)
MCV RBC AUTO: 81.8 FL — SIGNIFICANT CHANGE UP (ref 80–100)
NRBC # BLD: 0 /100 WBCS — SIGNIFICANT CHANGE UP (ref 0–0)
PLATELET # BLD AUTO: 186 K/UL — SIGNIFICANT CHANGE UP (ref 150–400)
POTASSIUM SERPL-MCNC: 3.9 MMOL/L — SIGNIFICANT CHANGE UP (ref 3.5–5.3)
POTASSIUM SERPL-SCNC: 3.9 MMOL/L — SIGNIFICANT CHANGE UP (ref 3.5–5.3)
RBC # BLD: 4.99 M/UL — SIGNIFICANT CHANGE UP (ref 4.2–5.8)
RBC # FLD: 14.5 % — SIGNIFICANT CHANGE UP (ref 10.3–14.5)
SODIUM SERPL-SCNC: 138 MMOL/L — SIGNIFICANT CHANGE UP (ref 135–145)
WBC # BLD: 7.56 K/UL — SIGNIFICANT CHANGE UP (ref 3.8–10.5)
WBC # FLD AUTO: 7.56 K/UL — SIGNIFICANT CHANGE UP (ref 3.8–10.5)

## 2022-06-16 PROCEDURE — 83735 ASSAY OF MAGNESIUM: CPT

## 2022-06-16 PROCEDURE — C1874: CPT

## 2022-06-16 PROCEDURE — 93005 ELECTROCARDIOGRAM TRACING: CPT

## 2022-06-16 PROCEDURE — C1769: CPT

## 2022-06-16 PROCEDURE — 82553 CREATINE MB FRACTION: CPT

## 2022-06-16 PROCEDURE — 85730 THROMBOPLASTIN TIME PARTIAL: CPT

## 2022-06-16 PROCEDURE — 99239 HOSP IP/OBS DSCHRG MGMT >30: CPT

## 2022-06-16 PROCEDURE — 85025 COMPLETE CBC W/AUTO DIFF WBC: CPT

## 2022-06-16 PROCEDURE — 83036 HEMOGLOBIN GLYCOSYLATED A1C: CPT

## 2022-06-16 PROCEDURE — 82962 GLUCOSE BLOOD TEST: CPT

## 2022-06-16 PROCEDURE — 85610 PROTHROMBIN TIME: CPT

## 2022-06-16 PROCEDURE — 93010 ELECTROCARDIOGRAM REPORT: CPT

## 2022-06-16 PROCEDURE — 85347 COAGULATION TIME ACTIVATED: CPT

## 2022-06-16 PROCEDURE — 85027 COMPLETE CBC AUTOMATED: CPT

## 2022-06-16 PROCEDURE — 80053 COMPREHEN METABOLIC PANEL: CPT

## 2022-06-16 PROCEDURE — C1887: CPT

## 2022-06-16 PROCEDURE — C1894: CPT

## 2022-06-16 PROCEDURE — C1725: CPT

## 2022-06-16 PROCEDURE — 82550 ASSAY OF CK (CPK): CPT

## 2022-06-16 PROCEDURE — 80061 LIPID PANEL: CPT

## 2022-06-16 PROCEDURE — 80048 BASIC METABOLIC PNL TOTAL CA: CPT

## 2022-06-16 PROCEDURE — 36415 COLL VENOUS BLD VENIPUNCTURE: CPT

## 2022-06-16 RX ORDER — MAGNESIUM OXIDE 400 MG ORAL TABLET 241.3 MG
800 TABLET ORAL ONCE
Refills: 0 | Status: COMPLETED | OUTPATIENT
Start: 2022-06-16 | End: 2022-06-16

## 2022-06-16 RX ORDER — ASPIRIN/CALCIUM CARB/MAGNESIUM 324 MG
1 TABLET ORAL
Qty: 30 | Refills: 11
Start: 2022-06-16 | End: 2023-06-10

## 2022-06-16 RX ORDER — CLOPIDOGREL BISULFATE 75 MG/1
1 TABLET, FILM COATED ORAL
Qty: 30 | Refills: 11
Start: 2022-06-16 | End: 2023-06-10

## 2022-06-16 RX ADMIN — Medication 81 MILLIGRAM(S): at 11:25

## 2022-06-16 RX ADMIN — CLOPIDOGREL BISULFATE 75 MILLIGRAM(S): 75 TABLET, FILM COATED ORAL at 11:25

## 2022-06-16 RX ADMIN — FINASTERIDE 5 MILLIGRAM(S): 5 TABLET, FILM COATED ORAL at 11:26

## 2022-06-16 RX ADMIN — MAGNESIUM OXIDE 400 MG ORAL TABLET 800 MILLIGRAM(S): 241.3 TABLET ORAL at 11:26

## 2022-06-16 NOTE — DISCHARGE NOTE PROVIDER - NSDCFUADDAPPT_GEN_ALL_CORE_FT
Please follow up with you cardiologist, Dr. Solis, within 1-2 weeks of discharge home from the hospital.

## 2022-06-16 NOTE — DISCHARGE NOTE NURSING/CASE MANAGEMENT/SOCIAL WORK - PATIENT PORTAL LINK FT
You can access the FollowMyHealth Patient Portal offered by [facility] by registering at the following website: [email or web address]. By joining [employer]’s FollowMyHealth portal, you will also be able to view your health information using other applications (apps) compatible with our system.

## 2022-06-16 NOTE — DISCHARGE NOTE PROVIDER - HOSPITAL COURSE
70 yo Yakut/English speaking M w/ PMHx HTN, HLD, CAD s/p multiple PCI’s (STEVEN OM1 @ [facility] 6/16/21), DM2 with neuropathy and BPH who presented to cardiologist Dr. Solis with reports of exertional chest pain and SOB with minimal exertion . Pt denies fever, chills, cough, palpitations,  PND/orthopnea, LE edema, abdominal pain, N/V/D, dizziness, syncope. NST 5/13/22: medium-large size reversible perfusion defect of moderate intensity involving the entire (Basal-apical) inferior/inferolateral myocardial walls suggestive of inducible myocardial ischemia in the posterior coronary circulation, EF 55%. In light of patient’s risk factors, known h/o CAD, CCS 3 anginal equivalent symptoms, abnormal stress test, pt referred for cardiac catheterization with possible intervention if clinically indicated.      Pt is s/p cardiac cathaterization (6/15/22): w/ STEVEN mRCA; LCx patent stent, LAD mild dz, LM minimal irregularities, LVEDP 19mmHg. ACCESS: R radial artery w/ TR band for hemostasis.     Pt seen and evaluated on day of discharge. VSS, labs unremarkable, no events on telemetry, and physical exam benign w/ right radial access site stable without hematoma/bleeding. Hospital course reviewed with attending cardiologist and patient deemed stable for discharge home from the hospital. Discharge instructions reviewed with patient. Prescriptions sent to patient's preferred pharmacy. Pt is to follow up with outpatient cardiologist, Dr. Solis, within 1-2 weeks of discharge.        70 yo Yi/English speaking M w/ PMHx HTN, HLD, CAD s/p multiple PCI’s (STEVEN OM1 @ [facility] 6/16/21), DM2 with neuropathy and BPH who presented to cardiologist Dr. Solis with reports of exertional chest pain and SOB with minimal exertion . Pt denies fever, chills, cough, palpitations,  PND/orthopnea, LE edema, abdominal pain, N/V/D, dizziness, syncope. NST 5/13/22: medium-large size reversible perfusion defect of moderate intensity involving the entire (Basal-apical) inferior/inferolateral myocardial walls suggestive of inducible myocardial ischemia in the posterior coronary circulation, EF 55%. In light of patient’s risk factors, known h/o CAD, CCS 3 anginal equivalent symptoms, abnormal stress test, pt referred for cardiac catheterization with possible intervention if clinically indicated.      Pt is s/p cardiac catheterization (6/15/22): w/ STEVEN mRCA; LCx patent stent, LAD mild dz, LM minimal irregularities, LVEDP 19mmHg. ACCESS: R radial artery w/ TR band for hemostasis.     Pt seen and evaluated on day of discharge. VSS, labs unremarkable, no events on telemetry, and physical exam benign w/ right radial access site stable without hematoma/bleeding. Hospital course reviewed with Dr. Wadsworth and patient deemed stable for discharge home from the hospital. Discharge instructions reviewed with patient. Prescriptions sent to patient's preferred pharmacy. Pt is to follow up with outpatient cardiologist, Dr. Solis, within 1-2 weeks of discharge.     Cardiac Rehab (STEMI/NSTEMI/ACS/Unstable Angina/CHF/Chronic Stable Angina/Heart Surgery (CABG,Valve)/Post PCI):              *Education on benefits of Cardiac Rehab provided to patient: Yes         *Referral and Prescription Given for Cardiac Rehab : Yes         *Pt given list of locations & instructed to contact their insurance company to review list of participating providers          *Pt instructed to bring Cardiac Rehab prescription with them to Cardiology Follow up appointment for assistance with enrollment: Yes

## 2022-06-16 NOTE — DISCHARGE NOTE PROVIDER - NSDCMRMEDTOKEN_GEN_ALL_CORE_FT
age appropriate assistance Chanellaglnoel HaileikPen 100 units/mL subcutaneous solution: 20 unit(s) subcutaneous 2 times a day in am and pm   clopidogrel 75 mg oral tablet: 1 tab(s) orally once a day  dutasteride 0.5 mg oral capsule: 1 cap(s) orally once a day  Ecotrin Adult Low Strength 81 mg oral delayed release tablet: 1 tab(s) orally once a day  Janumet 50 mg-1000 mg oral tablet: 1 tab(s) orally 2 times a day (Restart on 6/19/21)  Lipitor 40 mg oral tablet: 1 tab(s) orally once a day (at bedtime)  ramipril 10 mg oral capsule: 1 cap(s) orally once a day  tamsulosin 0.4 mg oral capsule: 1 cap(s) orally once a day   aspirin 81 mg oral delayed release tablet: 1 tab(s) orally once a day   Basaglar KwikPen 100 units/mL subcutaneous solution: 20 unit(s) subcutaneous 2 times a day in am and pm   cardiac rehab 3x/week x12 weeks post PCI: Cardiac rehab 3x/week x12 weeks post PCI  clopidogrel 75 mg oral tablet: 1 tab(s) orally once a day  dutasteride 0.5 mg oral capsule: 1 cap(s) orally once a day  Janumet 50 mg-1000 mg oral tablet: 1 tab(s) orally 2 times a day (Restart on 6/19/21)  Lipitor 40 mg oral tablet: 1 tab(s) orally once a day (at bedtime)  ramipril 10 mg oral capsule: 1 cap(s) orally once a day  tamsulosin 0.4 mg oral capsule: 1 cap(s) orally once a day

## 2022-06-16 NOTE — DISCHARGE NOTE PROVIDER - CARE PROVIDER_API CALL
Tawanda Solis)  Cardiovascular Disease; Interventional Cardiology  [address] [place] [address]  Phone: [phone]  Fax: [phone]  Follow Up Time:

## 2022-06-16 NOTE — DISCHARGE NOTE NURSING/CASE MANAGEMENT/SOCIAL WORK - NSDCPEFALRISK_GEN_ALL_CORE
For information on Fall & Injury Prevention, visit: [email or web address] OR  [email or web address] OR  https://www.cdc.gov/steadi/patient.html

## 2022-06-16 NOTE — DISCHARGE NOTE PROVIDER - NSDCCPCAREPLAN_GEN_ALL_CORE_FT
PRINCIPAL DISCHARGE DIAGNOSIS  Diagnosis: CAD (coronary artery disease)  Assessment and Plan of Treatment: You underwent a cardiac angiogram and received a stent to the middle Right Coronary Artery (mRCA). PLEASE CONTINUE ASPIRIN 81MG DAILY AND PLAVIX 75MG DAILY. DO NOT STOP THESE MEDICATIONS FOR ANY REASON AS THEY ARE KEEPING YOUR STENT OPEN AND PREVENTING A HEART ATTACK. Avoid strenuous activity or heavy lifting for the next five days. Do not take a bath or swim for the next five days; you may shower. For any bleeding or hematoma formation (hardened blood collection under the skin) at the access site of RIGHT WRIST please hold pressure and go to the emergency room. Please follow up with Dr. GARCIA in 1-2 weeks. For recurrent chest pain, please call your doctor or go to the emergency room.        SECONDARY DISCHARGE DIAGNOSES  Diagnosis: Encounter for cardiac rehabilitation  Assessment and Plan of Treatment: We have provided you with a prescription for cardiac rehab which is medically supervised exercise program for your heart and has been shown to improve the quantity and quality of life of people with heart disease like yours. You should attend cardiac rehab 3 times per week for 12 weeks. We have provided you with a list of nearby facilities. Please call your insurance carrier to determine which of these facilities are covered under your plan. Please bring this prescription with you to your follow up appointment with your cardiologist who can then further assist you to enroll into a cardiac rehab program.    Diagnosis: HTN (hypertension)  Assessment and Plan of Treatment: Please continue medications as listed to keep your blood pressure controlled. For blood pressure that is too high or too low please see your doctor or go to the emergency room as necessary.    Diagnosis: HLD (hyperlipidemia)  Assessment and Plan of Treatment: Please continue Atorvastatin 40mg once daily to keep your cholesterol low. High cholesterol contributes to heart disease.    Diagnosis: DM (diabetes mellitus)  Assessment and Plan of Treatment: Please continue medications as listed for diabetes. Maintain a low carbohydrate, low sugar diet. Check for your blood sugars regularly.   RESTART JANUMET ON SATURDAY 6/18/22.

## 2022-06-20 DIAGNOSIS — E78.5 HYPERLIPIDEMIA, UNSPECIFIED: ICD-10-CM

## 2022-06-20 DIAGNOSIS — I25.10 ATHEROSCLEROTIC HEART DISEASE OF NATIVE CORONARY ARTERY WITHOUT ANGINA PECTORIS: ICD-10-CM

## 2022-06-20 DIAGNOSIS — E11.40 TYPE 2 DIABETES MELLITUS WITH DIABETIC NEUROPATHY, UNSPECIFIED: ICD-10-CM

## 2022-06-20 DIAGNOSIS — N40.0 BENIGN PROSTATIC HYPERPLASIA WITHOUT LOWER URINARY TRACT SYMPTOMS: ICD-10-CM

## 2022-06-20 DIAGNOSIS — Z79.82 LONG TERM (CURRENT) USE OF ASPIRIN: ICD-10-CM

## 2022-06-20 DIAGNOSIS — Z79.4 LONG TERM (CURRENT) USE OF INSULIN: ICD-10-CM

## 2022-06-20 DIAGNOSIS — Z95.5 PRESENCE OF CORONARY ANGIOPLASTY IMPLANT AND GRAFT: ICD-10-CM

## 2022-06-20 DIAGNOSIS — Z79.02 LONG TERM (CURRENT) USE OF ANTITHROMBOTICS/ANTIPLATELETS: ICD-10-CM

## 2022-06-20 DIAGNOSIS — I44.7 LEFT BUNDLE-BRANCH BLOCK, UNSPECIFIED: ICD-10-CM

## 2022-06-20 DIAGNOSIS — I10 ESSENTIAL (PRIMARY) HYPERTENSION: ICD-10-CM

## 2022-06-20 DIAGNOSIS — R94.39 ABNORMAL RESULT OF OTHER CARDIOVASCULAR FUNCTION STUDY: ICD-10-CM

## 2022-06-20 DIAGNOSIS — Z82.49 FAMILY HISTORY OF ISCHEMIC HEART DISEASE AND OTHER DISEASES OF THE CIRCULATORY SYSTEM: ICD-10-CM

## 2022-07-06 LAB — ISTAT ACTK (ACTIVATED CLOTTING TIME KAOLIN): 265 SEC — HIGH (ref 74–137)

## 2023-02-16 ENCOUNTER — INPATIENT (INPATIENT)
Facility: HOSPITAL | Age: 73
LOS: 3 days | Discharge: HOME | DRG: 164 | End: 2023-02-20
Attending: THORACIC SURGERY (CARDIOTHORACIC VASCULAR SURGERY) | Admitting: THORACIC SURGERY (CARDIOTHORACIC VASCULAR SURGERY)
Payer: MEDICARE

## 2023-02-16 ENCOUNTER — TRANSCRIPTION ENCOUNTER (OUTPATIENT)
Age: 73
End: 2023-02-16

## 2023-02-16 VITALS
HEART RATE: 100 BPM | TEMPERATURE: 98 F | WEIGHT: 242.51 LBS | RESPIRATION RATE: 18 BRPM | DIASTOLIC BLOOD PRESSURE: 67 MMHG | OXYGEN SATURATION: 94 % | SYSTOLIC BLOOD PRESSURE: 160 MMHG

## 2023-02-16 LAB
A1C WITH ESTIMATED AVERAGE GLUCOSE RESULT: 9.1 % — HIGH (ref 4–5.6)
ALBUMIN SERPL ELPH-MCNC: 3.1 G/DL — LOW (ref 3.3–5)
ALBUMIN SERPL ELPH-MCNC: 3.3 G/DL — SIGNIFICANT CHANGE UP (ref 3.3–5)
ALP SERPL-CCNC: 104 U/L — SIGNIFICANT CHANGE UP (ref 40–120)
ALP SERPL-CCNC: 91 U/L — SIGNIFICANT CHANGE UP (ref 40–120)
ALT FLD-CCNC: 10 U/L — SIGNIFICANT CHANGE UP (ref 10–45)
ALT FLD-CCNC: 10 U/L — SIGNIFICANT CHANGE UP (ref 10–45)
ANION GAP SERPL CALC-SCNC: 10 MMOL/L — SIGNIFICANT CHANGE UP (ref 5–17)
ANION GAP SERPL CALC-SCNC: 13 MMOL/L — SIGNIFICANT CHANGE UP (ref 5–17)
APPEARANCE UR: CLEAR — SIGNIFICANT CHANGE UP
APTT BLD: 29.9 SEC — SIGNIFICANT CHANGE UP (ref 27.5–35.5)
AST SERPL-CCNC: 10 U/L — SIGNIFICANT CHANGE UP (ref 10–40)
AST SERPL-CCNC: 8 U/L — LOW (ref 10–40)
BASOPHILS # BLD AUTO: 0.02 K/UL — SIGNIFICANT CHANGE UP (ref 0–0.2)
BASOPHILS NFR BLD AUTO: 0.2 % — SIGNIFICANT CHANGE UP (ref 0–2)
BILIRUB SERPL-MCNC: 0.4 MG/DL — SIGNIFICANT CHANGE UP (ref 0.2–1.2)
BILIRUB SERPL-MCNC: 0.5 MG/DL — SIGNIFICANT CHANGE UP (ref 0.2–1.2)
BILIRUB UR-MCNC: NEGATIVE — SIGNIFICANT CHANGE UP
BLD GP AB SCN SERPL QL: NEGATIVE — SIGNIFICANT CHANGE UP
BUN SERPL-MCNC: 10 MG/DL — SIGNIFICANT CHANGE UP (ref 7–23)
BUN SERPL-MCNC: 10 MG/DL — SIGNIFICANT CHANGE UP (ref 7–23)
CALCIUM SERPL-MCNC: 8.6 MG/DL — SIGNIFICANT CHANGE UP (ref 8.4–10.5)
CALCIUM SERPL-MCNC: 9.1 MG/DL — SIGNIFICANT CHANGE UP (ref 8.4–10.5)
CHLORIDE SERPL-SCNC: 97 MMOL/L — SIGNIFICANT CHANGE UP (ref 96–108)
CHLORIDE SERPL-SCNC: 99 MMOL/L — SIGNIFICANT CHANGE UP (ref 96–108)
CHOLEST SERPL-MCNC: 145 MG/DL — SIGNIFICANT CHANGE UP
CO2 SERPL-SCNC: 24 MMOL/L — SIGNIFICANT CHANGE UP (ref 22–31)
CO2 SERPL-SCNC: 26 MMOL/L — SIGNIFICANT CHANGE UP (ref 22–31)
COLOR SPEC: YELLOW — SIGNIFICANT CHANGE UP
CREAT SERPL-MCNC: 0.7 MG/DL — SIGNIFICANT CHANGE UP (ref 0.5–1.3)
CREAT SERPL-MCNC: 0.71 MG/DL — SIGNIFICANT CHANGE UP (ref 0.5–1.3)
DIFF PNL FLD: NEGATIVE — SIGNIFICANT CHANGE UP
EGFR: 99 ML/MIN/1.73M2 — SIGNIFICANT CHANGE UP
EGFR: 99 ML/MIN/1.73M2 — SIGNIFICANT CHANGE UP
EOSINOPHIL # BLD AUTO: 0.14 K/UL — SIGNIFICANT CHANGE UP (ref 0–0.5)
EOSINOPHIL NFR BLD AUTO: 1.4 % — SIGNIFICANT CHANGE UP (ref 0–6)
ESTIMATED AVERAGE GLUCOSE: 214 MG/DL — HIGH (ref 68–114)
GLUCOSE BLDC GLUCOMTR-MCNC: 253 MG/DL — HIGH (ref 70–99)
GLUCOSE BLDC GLUCOMTR-MCNC: 429 MG/DL — HIGH (ref 70–99)
GLUCOSE BLDC GLUCOMTR-MCNC: 449 MG/DL — HIGH (ref 70–99)
GLUCOSE SERPL-MCNC: 275 MG/DL — HIGH (ref 70–99)
GLUCOSE SERPL-MCNC: 496 MG/DL — CRITICAL HIGH (ref 70–99)
GLUCOSE UR QL: >=1000
HCT VFR BLD CALC: 31.6 % — LOW (ref 39–50)
HDLC SERPL-MCNC: 39 MG/DL — LOW
HGB BLD-MCNC: 9.8 G/DL — LOW (ref 13–17)
IMM GRANULOCYTES NFR BLD AUTO: 0.7 % — SIGNIFICANT CHANGE UP (ref 0–0.9)
INR BLD: 1.1 — SIGNIFICANT CHANGE UP (ref 0.88–1.16)
KETONES UR-MCNC: NEGATIVE — SIGNIFICANT CHANGE UP
LEUKOCYTE ESTERASE UR-ACNC: NEGATIVE — SIGNIFICANT CHANGE UP
LIPID PNL WITH DIRECT LDL SERPL: 79 MG/DL — SIGNIFICANT CHANGE UP
LYMPHOCYTES # BLD AUTO: 2.09 K/UL — SIGNIFICANT CHANGE UP (ref 1–3.3)
LYMPHOCYTES # BLD AUTO: 21.1 % — SIGNIFICANT CHANGE UP (ref 13–44)
MCHC RBC-ENTMCNC: 24.9 PG — LOW (ref 27–34)
MCHC RBC-ENTMCNC: 31 GM/DL — LOW (ref 32–36)
MCV RBC AUTO: 80.4 FL — SIGNIFICANT CHANGE UP (ref 80–100)
MONOCYTES # BLD AUTO: 0.85 K/UL — SIGNIFICANT CHANGE UP (ref 0–0.9)
MONOCYTES NFR BLD AUTO: 8.6 % — SIGNIFICANT CHANGE UP (ref 2–14)
NEUTROPHILS # BLD AUTO: 6.73 K/UL — SIGNIFICANT CHANGE UP (ref 1.8–7.4)
NEUTROPHILS NFR BLD AUTO: 68 % — SIGNIFICANT CHANGE UP (ref 43–77)
NITRITE UR-MCNC: NEGATIVE — SIGNIFICANT CHANGE UP
NON HDL CHOLESTEROL: 106 MG/DL — SIGNIFICANT CHANGE UP
NRBC # BLD: 0 /100 WBCS — SIGNIFICANT CHANGE UP (ref 0–0)
PH UR: 5 — SIGNIFICANT CHANGE UP (ref 5–8)
PLATELET # BLD AUTO: 407 K/UL — HIGH (ref 150–400)
POTASSIUM SERPL-MCNC: 4 MMOL/L — SIGNIFICANT CHANGE UP (ref 3.5–5.3)
POTASSIUM SERPL-MCNC: 4.5 MMOL/L — SIGNIFICANT CHANGE UP (ref 3.5–5.3)
POTASSIUM SERPL-SCNC: 4 MMOL/L — SIGNIFICANT CHANGE UP (ref 3.5–5.3)
POTASSIUM SERPL-SCNC: 4.5 MMOL/L — SIGNIFICANT CHANGE UP (ref 3.5–5.3)
PROT SERPL-MCNC: 6.3 G/DL — SIGNIFICANT CHANGE UP (ref 6–8.3)
PROT SERPL-MCNC: 7 G/DL — SIGNIFICANT CHANGE UP (ref 6–8.3)
PROT UR-MCNC: NEGATIVE MG/DL — SIGNIFICANT CHANGE UP
PROTHROM AB SERPL-ACNC: 13.1 SEC — SIGNIFICANT CHANGE UP (ref 10.5–13.4)
RBC # BLD: 3.93 M/UL — LOW (ref 4.2–5.8)
RBC # FLD: 14 % — SIGNIFICANT CHANGE UP (ref 10.3–14.5)
RH IG SCN BLD-IMP: POSITIVE — SIGNIFICANT CHANGE UP
RH IG SCN BLD-IMP: POSITIVE — SIGNIFICANT CHANGE UP
SARS-COV-2 RNA SPEC QL NAA+PROBE: NEGATIVE — SIGNIFICANT CHANGE UP
SODIUM SERPL-SCNC: 133 MMOL/L — LOW (ref 135–145)
SODIUM SERPL-SCNC: 136 MMOL/L — SIGNIFICANT CHANGE UP (ref 135–145)
SP GR SPEC: 1.02 — SIGNIFICANT CHANGE UP (ref 1–1.03)
TRIGL SERPL-MCNC: 133 MG/DL — SIGNIFICANT CHANGE UP
TSH SERPL-MCNC: 1.12 UIU/ML — SIGNIFICANT CHANGE UP (ref 0.27–4.2)
UROBILINOGEN FLD QL: 0.2 E.U./DL — SIGNIFICANT CHANGE UP
WBC # BLD: 9.9 K/UL — SIGNIFICANT CHANGE UP (ref 3.8–10.5)
WBC # FLD AUTO: 9.9 K/UL — SIGNIFICANT CHANGE UP (ref 3.8–10.5)

## 2023-02-16 PROCEDURE — 99221 1ST HOSP IP/OBS SF/LOW 40: CPT

## 2023-02-16 PROCEDURE — 93306 TTE W/DOPPLER COMPLETE: CPT | Mod: 26

## 2023-02-16 PROCEDURE — 71260 CT THORAX DX C+: CPT | Mod: 26,MA

## 2023-02-16 PROCEDURE — 99285 EMERGENCY DEPT VISIT HI MDM: CPT

## 2023-02-16 PROCEDURE — 71046 X-RAY EXAM CHEST 2 VIEWS: CPT | Mod: 26

## 2023-02-16 PROCEDURE — 99221 1ST HOSP IP/OBS SF/LOW 40: CPT | Mod: 57

## 2023-02-16 RX ORDER — DEXTROSE 50 % IN WATER 50 %
12.5 SYRINGE (ML) INTRAVENOUS ONCE
Refills: 0 | Status: DISCONTINUED | OUTPATIENT
Start: 2023-02-16 | End: 2023-02-17

## 2023-02-16 RX ORDER — CHLORHEXIDINE GLUCONATE 213 G/1000ML
1 SOLUTION TOPICAL ONCE
Refills: 0 | Status: COMPLETED | OUTPATIENT
Start: 2023-02-17 | End: 2023-02-17

## 2023-02-16 RX ORDER — CHLORHEXIDINE GLUCONATE 213 G/1000ML
15 SOLUTION TOPICAL ONCE
Refills: 0 | Status: DISCONTINUED | OUTPATIENT
Start: 2023-02-17 | End: 2023-02-17

## 2023-02-16 RX ORDER — SODIUM CHLORIDE 9 MG/ML
1000 INJECTION, SOLUTION INTRAVENOUS
Refills: 0 | Status: DISCONTINUED | OUTPATIENT
Start: 2023-02-16 | End: 2023-02-17

## 2023-02-16 RX ORDER — DEXTROSE 50 % IN WATER 50 %
15 SYRINGE (ML) INTRAVENOUS ONCE
Refills: 0 | Status: DISCONTINUED | OUTPATIENT
Start: 2023-02-16 | End: 2023-02-17

## 2023-02-16 RX ORDER — DUTASTERIDE 0.5 MG/1
1 CAPSULE, LIQUID FILLED ORAL
Qty: 0 | Refills: 0 | DISCHARGE

## 2023-02-16 RX ORDER — TAMSULOSIN HYDROCHLORIDE 0.4 MG/1
0.4 CAPSULE ORAL AT BEDTIME
Refills: 0 | Status: DISCONTINUED | OUTPATIENT
Start: 2023-02-16 | End: 2023-02-17

## 2023-02-16 RX ORDER — INSULIN HUMAN 100 [IU]/ML
12 INJECTION, SOLUTION SUBCUTANEOUS ONCE
Refills: 0 | Status: COMPLETED | OUTPATIENT
Start: 2023-02-16 | End: 2023-02-16

## 2023-02-16 RX ORDER — GLUCAGON INJECTION, SOLUTION 0.5 MG/.1ML
1 INJECTION, SOLUTION SUBCUTANEOUS ONCE
Refills: 0 | Status: DISCONTINUED | OUTPATIENT
Start: 2023-02-16 | End: 2023-02-17

## 2023-02-16 RX ORDER — CHLORHEXIDINE GLUCONATE 213 G/1000ML
1 SOLUTION TOPICAL ONCE
Refills: 0 | Status: COMPLETED | OUTPATIENT
Start: 2023-02-16 | End: 2023-02-17

## 2023-02-16 RX ORDER — LISINOPRIL 2.5 MG/1
40 TABLET ORAL DAILY
Refills: 0 | Status: DISCONTINUED | OUTPATIENT
Start: 2023-02-16 | End: 2023-02-17

## 2023-02-16 RX ORDER — ATORVASTATIN CALCIUM 80 MG/1
40 TABLET, FILM COATED ORAL AT BEDTIME
Refills: 0 | Status: DISCONTINUED | OUTPATIENT
Start: 2023-02-16 | End: 2023-02-17

## 2023-02-16 RX ORDER — ASPIRIN/CALCIUM CARB/MAGNESIUM 324 MG
81 TABLET ORAL DAILY
Refills: 0 | Status: DISCONTINUED | OUTPATIENT
Start: 2023-02-16 | End: 2023-02-17

## 2023-02-16 RX ORDER — INSULIN HUMAN 100 [IU]/ML
8 INJECTION, SOLUTION SUBCUTANEOUS ONCE
Refills: 0 | Status: COMPLETED | OUTPATIENT
Start: 2023-02-16 | End: 2023-02-16

## 2023-02-16 RX ORDER — ACETAMINOPHEN 500 MG
650 TABLET ORAL EVERY 6 HOURS
Refills: 0 | Status: DISCONTINUED | OUTPATIENT
Start: 2023-02-16 | End: 2023-02-17

## 2023-02-16 RX ORDER — DEXTROSE 50 % IN WATER 50 %
25 SYRINGE (ML) INTRAVENOUS ONCE
Refills: 0 | Status: DISCONTINUED | OUTPATIENT
Start: 2023-02-16 | End: 2023-02-17

## 2023-02-16 RX ORDER — INSULIN LISPRO 100/ML
VIAL (ML) SUBCUTANEOUS
Refills: 0 | Status: DISCONTINUED | OUTPATIENT
Start: 2023-02-16 | End: 2023-02-17

## 2023-02-16 RX ORDER — SODIUM CHLORIDE 9 MG/ML
3 INJECTION INTRAMUSCULAR; INTRAVENOUS; SUBCUTANEOUS EVERY 8 HOURS
Refills: 0 | Status: DISCONTINUED | OUTPATIENT
Start: 2023-02-16 | End: 2023-02-17

## 2023-02-16 RX ORDER — PANTOPRAZOLE SODIUM 20 MG/1
40 TABLET, DELAYED RELEASE ORAL
Refills: 0 | Status: DISCONTINUED | OUTPATIENT
Start: 2023-02-16 | End: 2023-02-17

## 2023-02-16 RX ORDER — SODIUM CHLORIDE 9 MG/ML
1000 INJECTION INTRAMUSCULAR; INTRAVENOUS; SUBCUTANEOUS ONCE
Refills: 0 | Status: COMPLETED | OUTPATIENT
Start: 2023-02-16 | End: 2023-02-16

## 2023-02-16 RX ADMIN — SODIUM CHLORIDE 1000 MILLILITER(S): 9 INJECTION INTRAMUSCULAR; INTRAVENOUS; SUBCUTANEOUS at 11:25

## 2023-02-16 RX ADMIN — Medication 12: at 22:01

## 2023-02-16 RX ADMIN — TAMSULOSIN HYDROCHLORIDE 0.4 MILLIGRAM(S): 0.4 CAPSULE ORAL at 22:00

## 2023-02-16 RX ADMIN — Medication 650 MILLIGRAM(S): at 19:15

## 2023-02-16 RX ADMIN — ATORVASTATIN CALCIUM 40 MILLIGRAM(S): 80 TABLET, FILM COATED ORAL at 22:00

## 2023-02-16 RX ADMIN — SODIUM CHLORIDE 3 MILLILITER(S): 9 INJECTION INTRAMUSCULAR; INTRAVENOUS; SUBCUTANEOUS at 15:07

## 2023-02-16 RX ADMIN — INSULIN HUMAN 12 UNIT(S): 100 INJECTION, SOLUTION SUBCUTANEOUS at 21:59

## 2023-02-16 RX ADMIN — INSULIN HUMAN 8 UNIT(S): 100 INJECTION, SOLUTION SUBCUTANEOUS at 11:47

## 2023-02-16 RX ADMIN — Medication 650 MILLIGRAM(S): at 18:29

## 2023-02-16 NOTE — CONSULT NOTE ADULT - ASSESSMENT
72 M PMHx of HTN, HLD, CAD s/p multiple PCI’s, DM2, presents to ED this morning with retained hemothorax pending VATs.     #Preop Risk  RCRI: 2  ECG: NSR, septal infarct pattern grossly unchanged from prior ECG   TTE with normal LV systolic function, mild LVH, G1DD, no significant valvular pathology, PASP 16  Functional Capacity: METs>4   Optimized from a cardiovascular standpoint  No further work up necessary to reduce risk  Case discussed with Dr. Solis please D/c plavix, given extensive CAD history patient needs to be on ASA 81 through the procedure if possible   Hold Ace-I 24hrs prior to OR   Intermediate risk for intermediate risk procedure     #CAD   s/p PCI to OM1, LCx and mRCA most recent cath 6/2022 with patent stents and no new anginal symptoms   Discussed with Dr. Solis (Interventional cards)  - d/c plavix given >6months out, continue on Aspirin monotherapy, should be continued perioperatively given extensive PCI history  - c/w Atorva 40     discussed with consult attending   
72y Male with a past medical history of HTN, HLD, CAD s/p multiple stents, and T2DM. He presents to [facility] with retained hemothorax after chest tube placement last week which was subsequently removed as he was in [place], and is pending VATS procedure.   Endocrine service consulted for diabetes management.     A1C: 8.9 %  BUN: 10  Creatinine: 0.70  eGFR: 99Weight (kg): 110  BMI (kg/m2): 38    # Type 2 diabetes mellitus  - Please start lantus 25 units at bedtime.   - Please start lispro 8 units before each meal.  - Please make sure patient is ordered for lispro moderate dose sliding scale four times daily with meals and at bedtime.  - Patient's fingerstick glucose goal is 100-180 mg/dL.    - Patient can follow up at discharge with [facility] by calling [phone] to make an appointment.    - cc diet without snacks    Yasmin Argueta, pgy3  NOTE AND RECOMMENDATIONS NOT FINALIZED UNTIL REVIEWED AND SIGNED BY ENDOCRINOLOGY ATTENDING.

## 2023-02-16 NOTE — ED ADULT NURSE NOTE - OBJECTIVE STATEMENT
72y M, A&ox3, presents to ed from [place] for continuation of care. Pt sustained fall x3 weeks ago and admitted into hospital in [place] for hemothorax. Pt had a chest tube placed in [place]. PT requested to leave and receive care in [place]. Chest tube removed prior to dc. on arrival, chest rise symmetrical, no tracheal deviation. lungs clear upper and mid lobs. mild diminished left lower lateral lobe. dressing in placed prior to arrival. no fevers nor chills. no cp nor sob, no cough.

## 2023-02-16 NOTE — ED PROVIDER NOTE - PHYSICAL EXAMINATION
CONSTITUTIONAL: Well-appearing;  in no apparent distress.   HEAD: Normocephalic; atraumatic.   EYES: PERRL; EOM intact; conjunctiva and sclera clear  ENT: normal nose; no rhinorrhea; normal pharynx with no erythema or lesions.   NECK: Supple; non-tender; no LAD  CARDIOVASCULAR: Normal S1, S2; No audible murmurs. Regular rate and rhythm.   RESPIRATORY: Breathing easily; dec BS L lung. sutures in place from L chest tube   GI: Soft; non-distended; non-tender; no palpable organomegaly.   MSK: FROM at all extremities, normal tone   EXT: No cyanosis or edema; N/V intact  SKIN: Normal for age and race; warm; dry; good turgor; no apparent lesions or rash.   NEURO: A & O x 3; face symmetric; grossly unremarkable.   PSYCHOLOGICAL: The patient’s mood and manner are appropriate.

## 2023-02-16 NOTE — CONSULT NOTE ADULT - SUBJECTIVE AND OBJECTIVE BOX
MD CHICA ChavezA   Cardiology Fellow    Pager [phone]     Patient is a 72y old  Male who presents with a chief complaint of Hemothorax (16 Feb 2023 18:02)    HPI:  72 M hx HTN, HLD, CAD s/p multiple PCI’s (STEVEN OM1 @ [facility] 6/16/21), DM2, presents to ED this morning with retained hemothorax. Pt states he was visiting family in [place] last week and slipped and fell getting out of the shower. States he was found to have a hemothorax and chest tube was placed. Pt was planned for VATS at hospital in [place], but [place] have care in [place]. Chest tube was removed, and pt flew back to [facility] yesterday. Pt reports pain to L ribs. Reports some difficutly taking deep breaths. Denies dizziness with ambulation.  (16 Feb 2023 13:25)    Cardiology consulted for preoperative risk assessment. Patient reports good exercise tolerance can walk 20-30 blocks without shortness of breath. He has been experiencing shortness of breath lately due to the hemothorax. He denies chest pain, lower extremity edema and palpitations.     REVIEW OF SYSTEMS:   12 pt ROS otherwise negative     PAST MEDICAL & SURGICAL HISTORY:  Diabetes  HTN (hypertension)  CAD (coronary artery disease)  BPH (benign prostatic hyperplasia)  No significant past surgical history    SOCIAL HISTORY:  Denies EtOH and tobacco use     Allergies  No Known Allergies    HOME MEDICATIONS:  Basaglar KwikPen 100 units/mL subcutaneous solution: 20 unit(s) subcutaneous 2 times a day in am and pm  (16 Feb 2023 13:34)  glimepiride 2 mg oral tablet: 1 tab(s) orally once a day (16 Feb 2023 13:34)  Janumet 50 mg-1000 mg oral tablet: 1 tab(s) orally 2 times a day (Restart on 6/19/21) (16 Feb 2023 13:34)  Jardiance 10 mg oral tablet: 1 tab(s) orally once a day (in the morning) (16 Feb 2023 13:34)  oxybutynin 10 mg/24 hr oral tablet, extended release: 1 tab(s) orally once a day (16 Feb 2023 13:34)  tamsulosin 0.4 mg oral capsule: 1 cap(s) orally once a day (16 Feb 2023 13:34)    Vital Signs Last 24 Hrs  T(C): 36.9 (16 Feb 2023 16:36), Max: 36.9 (16 Feb 2023 16:36)  T(F): 98.5 (16 Feb 2023 16:36), Max: 98.5 (16 Feb 2023 16:36)  HR: 93 (16 Feb 2023 16:36) (93 - 100)  BP: 156/77 (16 Feb 2023 16:36) (129/77 - 160/67)  RR: 18 (16 Feb 2023 16:36) (18 - 18)  SpO2: 98% (16 Feb 2023 16:36) (94% - 98%)    Parameters below as of 16 Feb 2023 16:36  Patient On (Oxygen Delivery Method): room air    PHYSICAL EXAM:  GENERAL: NAD  HEAD:  Atraumatic, Normocephalic  EYES: EOMI, conjunctiva and sclera clear  NECK: Supple, No JVD  CHEST/LUNG: Clear to auscultation bilaterally; No wheeze. Sutures left chest wall ww/TTP   HEART: Regular rate and rhythm; No murmurs, rubs, or gallops  ABDOMEN: Soft, Nontender, Nondistended; Bowel sounds present  EXTREMITIES:  2+ Peripheral Pulses, No clubbing, cyanosis, or edema  PSYCH: AAOx3  NEUROLOGY: non-focal  SKIN: No rashes or lesions    LABS                        9.8    9.90  )-----------( 407      ( 16 Feb 2023 09:57 )             31.6     02-16    133<L>  |  99  |  10  ----------------------------<  275<H>  4.0   |  24  |  0.70  02-16    136  |  97  |  10  ----------------------------<  496<HH>  4.5   |  26  |  0.71    Ca    8.6      16 Feb 2023 15:14  Ca    9.1      16 Feb 2023 09:57    TPro  6.3  /  Alb  3.1<L>  /  TBili  0.4  /  DBili  x   /  AST  8<L>  /  ALT  10  /  AlkPhos  91  02-16  TPro  7.0  /  Alb  3.3  /  TBili  0.5  /  DBili  x   /  AST  10  /  ALT  10  /  AlkPhos  104  02-16    CAPILLARY BLOOD GLUCOSE  POCT Blood Glucose.: 253 mg/dL (16 Feb 2023 13:01)  POCT Blood Glucose.: 422 mg/dL (16 Feb 2023 11:30)    PT/INR - ( 16 Feb 2023 09:57 )   PT: 13.1 sec;   INR: 1.10     PTT - ( 16 Feb 2023 09:57 )  PTT:29.9 sec    MEDICATIONS  (STANDING):  aspirin enteric coated 81 milliGRAM(s) Oral daily  atorvastatin 40 milliGRAM(s) Oral at bedtime  chlorhexidine 4% Liquid 1 Application(s) Topical once  chlorhexidine 4% Liquid 1 Application(s) Topical once  dextrose 5%. 1000 milliLiter(s) (50 mL/Hr) IV Continuous <Continuous>  dextrose 5%. 1000 milliLiter(s) (100 mL/Hr) IV Continuous <Continuous>  dextrose 50% Injectable 25 Gram(s) IV Push once  dextrose 50% Injectable 12.5 Gram(s) IV Push once  dextrose 50% Injectable 25 Gram(s) IV Push once  glucagon  Injectable 1 milliGRAM(s) IntraMuscular once  insulin lispro (ADMELOG) corrective regimen sliding scale   SubCutaneous three times a day before meals  lisinopril 40 milliGRAM(s) Oral daily  pantoprazole    Tablet 40 milliGRAM(s) Oral before breakfast  sodium chloride 0.9% lock flush 3 milliLiter(s) IV Push every 8 hours  tamsulosin 0.4 milliGRAM(s) Oral at bedtime    MEDICATIONS  (PRN):  acetaminophen     Tablet .. 650 milliGRAM(s) Oral every 6 hours PRN Mild Pain (1 - 3)  dextrose Oral Gel 15 Gram(s) Oral once PRN Blood Glucose LESS THAN 70 milliGRAM(s)/deciliter    ECG:  NSR, septal infarct pattern grossly unchanged from prior ECG     ECHO FINDINGS:  < from: TTE Echo Complete w/o Contrast w/ Doppler (02.16.23 @ 15:16) >  CONCLUSIONS:     1. Normal left and right ventricular size and systolic function.   2. Mild symmetric left ventricular hypertrophy.   3. Doppler findings are not conclusive but are suggestive of grade I   diastolc dysfunction.   4. No significant valvular disease.   5. No evidence of pulmonary hypertension, pulmonary artery systolic   pressure is 16 mmHg.   6. No pericardial effusion.    < end of copied text >    RADIOLOGY & ADDITIONAL STUDIES:  Coronary Angiography  6/15/22     The coronary circulation is right dominant.      Left main artery: The segment is visually normal in size and    structure. Angiography shows no disease.    Left anterior descending artery: Angiography shows minor    irregularities. Mid LAD 30% lesion. First diagonal: Angiography    shows minor irregularities.      Circumflex: Angiography shows minor irregularities. Patent Mid Cx    stent. First obtuse marginal: Angiography shows minor    irregularities. Patent OM1 stent.    Right coronary artery: The segment is large, dominant. Angiography    shows minor irregularities. Mid RCA 80% lesion, s/p STEVEN   EDP 19     
HISTORY OF PRESENT ILLNESS  YFN GOLD is a 72y Male with a past medical history of HTN, HLD, CAD s/p multiple stents, and T2DM. He presents to [facility] with retained hemothorax after chest tube placement last week which was subsequently removed as he was in [place], and is pending VATS procedure tomorrow.   Endocrine service consulted for diabetes management.     Home medications:  Basaglar 20 u BID  Glimepiride 2 mg daily  Asa 81  Plavix 75    [place]ives in the [place] over the past 3-5 years. Originally from [place].   Reports chest/torso pain and discomfort with deep breaths.     ALLERGIES  No Known Allergies    CURRENT MEDICATIONS  acetaminophen     Tablet .. 650 milliGRAM(s) Oral every 6 hours PRN  aspirin enteric coated 81 milliGRAM(s) Oral daily  atorvastatin 40 milliGRAM(s) Oral at bedtime  chlorhexidine 4% Liquid 1 Application(s) Topical once  chlorhexidine 4% Liquid 1 Application(s) Topical once  lisinopril 40 milliGRAM(s) Oral daily  pantoprazole    Tablet 40 milliGRAM(s) Oral before breakfast  sodium chloride 0.9% lock flush 3 milliLiter(s) IV Push every 8 hours  tamsulosin 0.4 milliGRAM(s) Oral at bedtime    REVIEW OF SYSTEMS  as per HPI    PHYSICAL EXAM  Vital Signs Last 24 Hrs  T(C): 36.9 (16 Feb 2023 16:36), Max: 36.9 (16 Feb 2023 16:36)  T(F): 98.5 (16 Feb 2023 16:36), Max: 98.5 (16 Feb 2023 16:36)  HR: 93 (16 Feb 2023 16:36) (93 - 100)  BP: 156/77 (16 Feb 2023 16:36) (129/77 - 160/67)  BP(mean): --  RR: 18 (16 Feb 2023 16:36) (18 - 18)  SpO2: 98% (16 Feb 2023 16:36) (94% - 98%)    Parameters below as of 16 Feb 2023 16:36  Patient On (Oxygen Delivery Method): room air    PHYSICAL EXAM:  General: NAD, elderly male in ED stretcher. Using cane to ambulate. NAD.   HEENT: NC/AT; PERRL, clear conjunctiva  Neck: supple  Respiratory: CTA b/l  Cardiovascular: +S1/S2; RRR  Abdomen: soft, NT/ND; +BS x4  Extremities: 2+ peripheral pulses b/l; no LE edema  Skin: normal color and turgor; no rash  Neurological: AAO x 3. no FND.  Psychiatry: appropriate mood/affect     LABS  CBC - WBC/HGB/HTC/PLT: 9.90/9.8/31.6/407 (02-16-23)  BMP: Na/K/Cl/Bicarb/BUN/Cr/Gluc: 133/4.0/99/24/10/0.70/275 (02-16-23)  Anion Gap: 10 (02-16-23)  eGFR: 99 (02-16-23)  Calcium: 8.6 (02-16-23)  Phosphorus: -- (02-16-23)  Magnesium: -- (02-16-23)  LFT - Alb/Tprot/Tbili/Dbili/AlkPhos/ALT/AST: 3.1/--/0.4/--/91/10/8 (02-16-23)  PT/aPTT/INR: 13.1/29.9/1.10 (02-16-23)      CAPILLARY BLOOD GLUCOSE & INSULIN RECEIVED  s/p 8 units regular insulin  253 mg/dL (02-16 @ 13:01)  422 mg/dL (02-16 @ 11:30)

## 2023-02-16 NOTE — H&P ADULT - NSHPPHYSICALEXAM_GEN_ALL_CORE
PHYSICAL EXAM:  General: resting comfortably in bed in NAD  Neurological: AOx3. Motor skills grossly intact  Cardiovascular: Normal S1/S2. Regular rate/rhythm. No murmurs  Respiratory: Lungs CTA bilaterally. No wheezing or rales  Gastrointestinal: +BS in all 4 quadrants. Non-distended. Soft. Non-tender  Extremities: Strength 5/5 b/l upper/lower extremities. Sensation grossly intact upper/lower extremities. No edema. No calf tenderness.  Vascular: Radial 2+bilaterally, DP 2+ b/l  Incision Sites: tie down in place on L chest

## 2023-02-16 NOTE — H&P ADULT - NSICDXPASTSURGICALHX_GEN_ALL_CORE_FT
Cardiologist: Dr. Frankie Gil    Last appt: 6/1/2021  Future Appointments   Date Time Provider Namrata Singh   10/1/2021  9:00 AM Louie Schmitt MD NEUM BS AMB   12/2/2021 11:40 AM MD MIGUEL ANGEL Levin BS AMB       Requested Prescriptions     Signed Prescriptions Disp Refills    olmesartan-hydroCHLOROthiazide (BENICAR HCT) 40-25 mg per tablet 90 Tablet 1     Sig: TAKE 1 TABLET BY MOUTH EVERY DAY     Authorizing Provider: Constantino Armijo     Ordering User: TUNDE HOOVER         Refills VO per Dr. Jamey Dillon. PAST SURGICAL HISTORY:  No significant past surgical history

## 2023-02-16 NOTE — ED ADULT TRIAGE NOTE - CHIEF COMPLAINT QUOTE
Pt here, traveled from [place] to [place] yesterday for continuation of medical care. Pt was scheduled to have cardiac cath and VATS procedure d/t clotted hemothorax as per paper work. Pt had L chest tube removed prior to coming to [place], dressing clean, dry, intact. Pt currently co cough and ALFONSO.

## 2023-02-16 NOTE — H&P ADULT - ASSESSMENT
72 M hx HTN, HLD, CAD s/p multiple PCI’s (STEVEN OM1 @ [facility] 6/16/21), DM2, presents to ED this morning with retained hemothorax. Pt states he was visiting family in [place] last week and slipped and fell getting out of the shower. States he was found to have a hemothorax and chest tube was placed. Pt was planned for VATS at hospital in [place], but pt wished to have care in [place]. Chest tube was removed, and pt flew back to [facility] yesterday. Pt reports pain to L ribs. Reports some difficutly taking deep breaths. Denies dizziness with ambulation. Pt being admitted to thoracic service for L VATs tomorrow.     Plan:    Neurovascular:   -Pain well controlled with current regimen. PRN's: none    Cardiovascular:   -Hemodynamically stable.   -Monitor: BP, HR, tele  -CAD    -c/w ASA, unclear if pt still on plavix, but will hold for now given hemothorax  -Cardiology clearance pre op    Respiratory:   -Oxygenating well on room air  -Encourage continued use of IS 10x/hr and frequent ambulation  -CXR: Moderate L hemothorax  -OR tomorrow for L VATS, robotic assisted, washout w hemothorax evacuation, possible decortication    GI:  -GI PPX: protonix  -PO Diet  -Bowel Regimen: NA    Renal / :  -Continue to monitor renal function: BUN/Cr 10/.71  -Monitor I/O's daily     Endocrine:    -No hx of DM or thyroid dx  -A1c: pending  -TSH: pending    Hematologic:  -CBC: H/H- 9.8/31.6  -Coagulation Panel.    ID:  -CBC: WBC- 9.9  -Continue to observe for SIRS/Sepsis Syndrome.    Prophylaxis:  -DVT prophylaxis held due to bleeding risk 2/2 to hemothorax  -Continue with SCD's b/l while patient is at rest     Disposition:  -OR tomorrow for VATS

## 2023-02-16 NOTE — ED PROVIDER NOTE - ATTENDING APP SHARED VISIT CONTRIBUTION OF CARE
Pt is a 71yo m, h/o dm, htn, hld, cad s/p stents, s/p slip and fall 15d ago, dx'd with left sided hemothorax in [place], had chest tube placed w/ drainage of blood, required prbc transfusion, recommended for vats. Pt had chest tube removed yesterday and came to [facility] for vats procedure. No cp, sob. AVSS. PE as above. Pt is well appearing, no resp distress. + decreased bs to left lung. + s1, s2, rrr. Case d/w ct surg- will admit for vats.

## 2023-02-16 NOTE — ED ADULT NURSE NOTE - CHPI ED NUR SYMPTOMS NEG
no body aches/no chest pain/no chills/no cough/no diaphoresis/no edema/no fever/no headache/no shortness of breath/no wheezing

## 2023-02-16 NOTE — ED ADULT NURSE NOTE - SUICIDE SCREENING QUESTION 3
[FreeTextEntry1] : Mr. MILAD CRUZ   presents to the office with a wound since February 2019.  The wound is located on  the abdominal incisional site- .  The patient has complaints of nonhealing wound.   The patient has been dressing the wound with gauze with bacitracin.  The patient denies fevers or chills.  The patient has localized pain to the wound upon dressing changes.  The patient has no other complaints or associated symptoms.  Denies diabetes\par \par history of total gastrectomy esophagojejunal anastomosis for gastric cancer June 2014., no recurrence. s/p herniation with hernia repair w/o mesh as per patient.  s/p lap cholecystectomy (July 2018)  Patient had recurrence of hiatal hernia with subsequent bowel rupture of the [place].  s/p reop for anastamotic leak complicated with esophagitis and TPN for several months (Feb 2019).  \par \par Patient likes to go to [place] to [place].
No

## 2023-02-16 NOTE — ED PROVIDER NOTE - NS ED ATTENDING STATEMENT MOD
This was a shared visit with the ANG. I reviewed and verified the documentation and independently performed the documented:

## 2023-02-16 NOTE — ED PROVIDER NOTE - OBJECTIVE STATEMENT
89 yo Hx HTN, HLD, CAD s/p 5  stents,  DM2 with neuropathy and BPH presents for to ED for continuing of care status post fall with a hemothorax.  Patient was in [place] about 15 days ago for vacation when he slipped and fell in the shower and injured his left chest.  Patient had a massive left hemothorax with complete left lung collapse, s/p tube thoracostomy with drainage of 2000 cc of blood.  Patient was transfused 1 unit of blood and 1 unit of FFP.  Follow-up chest CT showed thick peel over the lung and atelectasis and still clotted hemothorax is present.  Patient was scheduled for VATS procedure but decided to come back [place] the [place] for [place] treatment.  Patient flew in yesterday.  Admits to a cough.  Denies fever, chills, chest pain, shortness of breath,

## 2023-02-16 NOTE — CONSULT NOTE ADULT - ATTENDING COMMENTS
Initial attending contact date   2/16/23   . See fellow note written above for details. I reviewed the fellow documentation. I have personally seen and examined this patient. I reviewed vitals, labs, medications, cardiac studies, and additional imaging. I agree with the above fellow's findings and plans as written above with the following additions/statements.    72 M PMHx of HTN, HLD, CAD s/p multiple PCI’s, DM2,admitted with retained hemothorax pending VATs.   -Cardiology consulted for pre-op risk assessment   -Pt able to perform 4 METS without anginal equivalents  -Imaging as noted   -ASA to be continued pre/aaliyah operatively. DC plavix   -Pt considered int risk for int risk procedure

## 2023-02-16 NOTE — ED PROVIDER NOTE - CLINICAL SUMMARY MEDICAL DECISION MAKING FREE TEXT BOX
89 yo Hx HTN, HLD, CAD s/p 5  stents,  DM2 with neuropathy and BPH presents for to ED for continuing of care status post fall with a hemothorax.  Patient was in [place] about 15 days ago for vacation when he slipped and fell in the shower and injured his left chest.  Patient had a massive left hemothorax with complete left lung collapse, s/p tube thoracostomy with drainage of 2000 cc of blood.  O2 sat 94%. Well appearing. Lung with dec BS to L lung. CXR shows L lung collapsed with effusion. Labs, CT surgery consulted

## 2023-02-16 NOTE — H&P ADULT - HISTORY OF PRESENT ILLNESS
72 M hx HTN, HLD, CAD s/p multiple PCI’s (STEVEN OM1 @ [facility] 6/16/21), DM2, presents to ED this morning with retained hemothorax. Pt states he was visiting family in [place] last week and slipped and fell getting out of the shower. States he was found to have a hemothorax and chest tube was placed. Pt was planned for VATS at hospital in [place], but [place] wished to have care in [place]. Chest tube was removed, and pt flew back to [facility] yesterday. Pt reports pain to L ribs. Reports some difficutly taking deep breaths. Denies dizziness with ambulation.

## 2023-02-16 NOTE — ED ADULT NURSE NOTE - CHIEF COMPLAINT QUOTE
Pt here, traveled from [place] yesterday for continuation of medical care. Pt was scheduled to have cardiac cath and VATS procedure d/t clotted hemothorax as per paper work. Pt had L chest tube removed prior to coming to [place], dressing clean, dry, intact. Pt currently co cough and ALFONSO.

## 2023-02-16 NOTE — H&P ADULT - NS ATTEND AMEND GEN_ALL_CORE FT
Patient seen and examined. EMR reviewed. Case discussed with Tia Short. Agree with documentation above with following additions:    Patient has retained hemothorax. VATS evacuation is indicated to prevent future fibrothorax and improve his respiratory function at this time. Will admit to [facility] in preparation for VATS procedure tomorrow. Patient has history of coronary stents. Will have cardiology evaluate him. Blood glucose is elevated, he will be evaluated by endocrinology as well. Will continue other supportive care and patient will be NPO after midnight.     Luis Krueger MD  Thoracic Surgery

## 2023-02-16 NOTE — H&P ADULT - NSHPREVIEWOFSYSTEMS_GEN_ALL_CORE
Review of Systems  CONSTITUTIONAL:  Denies Fevers / chills, sweats, fatigue, weight loss, weight gain                                      NEURO:  Denies changes in sensation, seizures, syncope, confusion                                                                            EYES:  Denies Blurry vision, discharge, pain, loss of vision                                                                                    ENMT:  Denies Difficulty hearing, vertigo, dysphagia, epistaxis, recent dental work                                       CV:  Denies Chest pain, palpitations, ALFONSO, orthopnea                                                                                          RESPIRATORY:  Denies Wheezing, SOB, cough / sputum, hemoptysis. report L lower rib pain.                                                               GI:  Denies Nausea, vomiting, diarrhea, constipation, melena, difficulty swallowing                                               : Denies Hematuria, dysuria, urgency, incontinence                                                                                         MUSKULOSKELETAL:  Denies arthritis, joint swelling, muscle weakness.                                                              SKIN/BREAST:  Denies rash, itching, hair loss, masses. Tie down in place over L lower ribs                                                                   PSYCH:  Denies depression, anxiety, suicidal ideation                                                                                               HEME/LYMPH:  Denies bruises easily, enlarged lymph nodes, tender lymph nodes                                        ENDOCRINE:  Denies cold intolerance, heat intolerance, polydipsia

## 2023-02-17 ENCOUNTER — TRANSCRIPTION ENCOUNTER (OUTPATIENT)
Age: 73
End: 2023-02-17

## 2023-02-17 ENCOUNTER — APPOINTMENT (OUTPATIENT)
Dept: THORACIC SURGERY | Facility: HOSPITAL | Age: 73
End: 2023-02-17

## 2023-02-17 LAB
A1C WITH ESTIMATED AVERAGE GLUCOSE RESULT: 9.2 % — HIGH (ref 4–5.6)
ALBUMIN SERPL ELPH-MCNC: 3.3 G/DL — SIGNIFICANT CHANGE UP (ref 3.3–5)
ALP SERPL-CCNC: 96 U/L — SIGNIFICANT CHANGE UP (ref 40–120)
ALT FLD-CCNC: 9 U/L — LOW (ref 10–45)
ANION GAP SERPL CALC-SCNC: 5 MMOL/L — SIGNIFICANT CHANGE UP (ref 5–17)
ANION GAP SERPL CALC-SCNC: 8 MMOL/L — SIGNIFICANT CHANGE UP (ref 5–17)
ANION GAP SERPL CALC-SCNC: 8 MMOL/L — SIGNIFICANT CHANGE UP (ref 5–17)
APTT BLD: 30 SEC — SIGNIFICANT CHANGE UP (ref 27.5–35.5)
AST SERPL-CCNC: 10 U/L — SIGNIFICANT CHANGE UP (ref 10–40)
BASOPHILS # BLD AUTO: 0.02 K/UL — SIGNIFICANT CHANGE UP (ref 0–0.2)
BASOPHILS NFR BLD AUTO: 0.1 % — SIGNIFICANT CHANGE UP (ref 0–2)
BILIRUB SERPL-MCNC: 0.5 MG/DL — SIGNIFICANT CHANGE UP (ref 0.2–1.2)
BLD GP AB SCN SERPL QL: NEGATIVE — SIGNIFICANT CHANGE UP
BUN SERPL-MCNC: 10 MG/DL — SIGNIFICANT CHANGE UP (ref 7–23)
BUN SERPL-MCNC: 11 MG/DL — SIGNIFICANT CHANGE UP (ref 7–23)
BUN SERPL-MCNC: 11 MG/DL — SIGNIFICANT CHANGE UP (ref 7–23)
CALCIUM SERPL-MCNC: 8.1 MG/DL — LOW (ref 8.4–10.5)
CALCIUM SERPL-MCNC: 8.9 MG/DL — SIGNIFICANT CHANGE UP (ref 8.4–10.5)
CALCIUM SERPL-MCNC: 9.1 MG/DL — SIGNIFICANT CHANGE UP (ref 8.4–10.5)
CHLORIDE SERPL-SCNC: 102 MMOL/L — SIGNIFICANT CHANGE UP (ref 96–108)
CHLORIDE SERPL-SCNC: 97 MMOL/L — SIGNIFICANT CHANGE UP (ref 96–108)
CHLORIDE SERPL-SCNC: 99 MMOL/L — SIGNIFICANT CHANGE UP (ref 96–108)
CHOLEST SERPL-MCNC: 128 MG/DL — SIGNIFICANT CHANGE UP
CO2 SERPL-SCNC: 26 MMOL/L — SIGNIFICANT CHANGE UP (ref 22–31)
CO2 SERPL-SCNC: 28 MMOL/L — SIGNIFICANT CHANGE UP (ref 22–31)
CO2 SERPL-SCNC: 28 MMOL/L — SIGNIFICANT CHANGE UP (ref 22–31)
CREAT SERPL-MCNC: 0.69 MG/DL — SIGNIFICANT CHANGE UP (ref 0.5–1.3)
CREAT SERPL-MCNC: 0.69 MG/DL — SIGNIFICANT CHANGE UP (ref 0.5–1.3)
CREAT SERPL-MCNC: 0.71 MG/DL — SIGNIFICANT CHANGE UP (ref 0.5–1.3)
EGFR: 100 ML/MIN/1.73M2 — SIGNIFICANT CHANGE UP
EGFR: 100 ML/MIN/1.73M2 — SIGNIFICANT CHANGE UP
EGFR: 99 ML/MIN/1.73M2 — SIGNIFICANT CHANGE UP
EOSINOPHIL # BLD AUTO: 0.08 K/UL — SIGNIFICANT CHANGE UP (ref 0–0.5)
EOSINOPHIL NFR BLD AUTO: 0.5 % — SIGNIFICANT CHANGE UP (ref 0–6)
ESTIMATED AVERAGE GLUCOSE: 217 MG/DL — HIGH (ref 68–114)
GLUCOSE BLDC GLUCOMTR-MCNC: 201 MG/DL — HIGH (ref 70–99)
GLUCOSE BLDC GLUCOMTR-MCNC: 215 MG/DL — HIGH (ref 70–99)
GLUCOSE BLDC GLUCOMTR-MCNC: 241 MG/DL — HIGH (ref 70–99)
GLUCOSE BLDC GLUCOMTR-MCNC: 254 MG/DL — HIGH (ref 70–99)
GLUCOSE BLDC GLUCOMTR-MCNC: 284 MG/DL — HIGH (ref 70–99)
GLUCOSE BLDC GLUCOMTR-MCNC: 291 MG/DL — HIGH (ref 70–99)
GLUCOSE BLDC GLUCOMTR-MCNC: 333 MG/DL — HIGH (ref 70–99)
GLUCOSE SERPL-MCNC: 216 MG/DL — HIGH (ref 70–99)
GLUCOSE SERPL-MCNC: 226 MG/DL — HIGH (ref 70–99)
GLUCOSE SERPL-MCNC: 282 MG/DL — HIGH (ref 70–99)
HCT VFR BLD CALC: 28.9 % — LOW (ref 39–50)
HCT VFR BLD CALC: 32.3 % — LOW (ref 39–50)
HDLC SERPL-MCNC: 41 MG/DL — SIGNIFICANT CHANGE UP
HGB BLD-MCNC: 9 G/DL — LOW (ref 13–17)
HGB BLD-MCNC: 9.9 G/DL — LOW (ref 13–17)
IMM GRANULOCYTES NFR BLD AUTO: 0.8 % — SIGNIFICANT CHANGE UP (ref 0–0.9)
INR BLD: 1.01 — SIGNIFICANT CHANGE UP (ref 0.88–1.16)
LIPID PNL WITH DIRECT LDL SERPL: 67 MG/DL — SIGNIFICANT CHANGE UP
LYMPHOCYTES # BLD AUTO: 1.18 K/UL — SIGNIFICANT CHANGE UP (ref 1–3.3)
LYMPHOCYTES # BLD AUTO: 6.9 % — LOW (ref 13–44)
MAGNESIUM SERPL-MCNC: 1.7 MG/DL — SIGNIFICANT CHANGE UP (ref 1.6–2.6)
MCHC RBC-ENTMCNC: 25 PG — LOW (ref 27–34)
MCHC RBC-ENTMCNC: 25.3 PG — LOW (ref 27–34)
MCHC RBC-ENTMCNC: 30.7 GM/DL — LOW (ref 32–36)
MCHC RBC-ENTMCNC: 31.1 GM/DL — LOW (ref 32–36)
MCV RBC AUTO: 81.2 FL — SIGNIFICANT CHANGE UP (ref 80–100)
MCV RBC AUTO: 81.6 FL — SIGNIFICANT CHANGE UP (ref 80–100)
MONOCYTES # BLD AUTO: 0.56 K/UL — SIGNIFICANT CHANGE UP (ref 0–0.9)
MONOCYTES NFR BLD AUTO: 3.3 % — SIGNIFICANT CHANGE UP (ref 2–14)
NEUTROPHILS # BLD AUTO: 15.02 K/UL — HIGH (ref 1.8–7.4)
NEUTROPHILS NFR BLD AUTO: 88.4 % — HIGH (ref 43–77)
NON HDL CHOLESTEROL: 87 MG/DL — SIGNIFICANT CHANGE UP
NRBC # BLD: 0 /100 WBCS — SIGNIFICANT CHANGE UP (ref 0–0)
NRBC # BLD: 0 /100 WBCS — SIGNIFICANT CHANGE UP (ref 0–0)
PLATELET # BLD AUTO: 368 K/UL — SIGNIFICANT CHANGE UP (ref 150–400)
PLATELET # BLD AUTO: 433 K/UL — HIGH (ref 150–400)
POTASSIUM SERPL-MCNC: 4.3 MMOL/L — SIGNIFICANT CHANGE UP (ref 3.5–5.3)
POTASSIUM SERPL-MCNC: 4.3 MMOL/L — SIGNIFICANT CHANGE UP (ref 3.5–5.3)
POTASSIUM SERPL-MCNC: 4.8 MMOL/L — SIGNIFICANT CHANGE UP (ref 3.5–5.3)
POTASSIUM SERPL-SCNC: 4.3 MMOL/L — SIGNIFICANT CHANGE UP (ref 3.5–5.3)
POTASSIUM SERPL-SCNC: 4.3 MMOL/L — SIGNIFICANT CHANGE UP (ref 3.5–5.3)
POTASSIUM SERPL-SCNC: 4.8 MMOL/L — SIGNIFICANT CHANGE UP (ref 3.5–5.3)
PROT SERPL-MCNC: 6.6 G/DL — SIGNIFICANT CHANGE UP (ref 6–8.3)
PROTHROM AB SERPL-ACNC: 12 SEC — SIGNIFICANT CHANGE UP (ref 10.5–13.4)
RBC # BLD: 3.56 M/UL — LOW (ref 4.2–5.8)
RBC # BLD: 3.96 M/UL — LOW (ref 4.2–5.8)
RBC # FLD: 14.1 % — SIGNIFICANT CHANGE UP (ref 10.3–14.5)
RBC # FLD: 14.3 % — SIGNIFICANT CHANGE UP (ref 10.3–14.5)
RH IG SCN BLD-IMP: POSITIVE — SIGNIFICANT CHANGE UP
SODIUM SERPL-SCNC: 133 MMOL/L — LOW (ref 135–145)
SODIUM SERPL-SCNC: 133 MMOL/L — LOW (ref 135–145)
SODIUM SERPL-SCNC: 135 MMOL/L — SIGNIFICANT CHANGE UP (ref 135–145)
TRIGL SERPL-MCNC: 98 MG/DL — SIGNIFICANT CHANGE UP
TSH SERPL-MCNC: 1.24 UIU/ML — SIGNIFICANT CHANGE UP (ref 0.27–4.2)
WBC # BLD: 10.65 K/UL — HIGH (ref 3.8–10.5)
WBC # BLD: 17 K/UL — HIGH (ref 3.8–10.5)
WBC # FLD AUTO: 10.65 K/UL — HIGH (ref 3.8–10.5)
WBC # FLD AUTO: 17 K/UL — HIGH (ref 3.8–10.5)

## 2023-02-17 PROCEDURE — 32652 THORACOSCOPY REM TOTL CORTEX: CPT | Mod: 80,LT

## 2023-02-17 PROCEDURE — 32652 THORACOSCOPY REM TOTL CORTEX: CPT | Mod: LT

## 2023-02-17 PROCEDURE — 71045 X-RAY EXAM CHEST 1 VIEW: CPT | Mod: 26

## 2023-02-17 PROCEDURE — 31624 DX BRONCHOSCOPE/LAVAGE: CPT

## 2023-02-17 DEVICE — CHEST DRAIN THORACIC PVC 28FR STRAIGHT: Type: IMPLANTABLE DEVICE | Status: FUNCTIONAL

## 2023-02-17 RX ORDER — INSULIN LISPRO 100/ML
8 VIAL (ML) SUBCUTANEOUS
Refills: 0 | Status: DISCONTINUED | OUTPATIENT
Start: 2023-02-17 | End: 2023-02-18

## 2023-02-17 RX ORDER — INSULIN LISPRO 100/ML
8 VIAL (ML) SUBCUTANEOUS
Refills: 0 | Status: DISCONTINUED | OUTPATIENT
Start: 2023-02-17 | End: 2023-02-17

## 2023-02-17 RX ORDER — INSULIN LISPRO 100/ML
8 VIAL (ML) SUBCUTANEOUS ONCE
Refills: 0 | Status: COMPLETED | OUTPATIENT
Start: 2023-02-17 | End: 2023-02-17

## 2023-02-17 RX ORDER — INSULIN GLARGINE 100 [IU]/ML
20 INJECTION, SOLUTION SUBCUTANEOUS EVERY MORNING
Refills: 0 | Status: DISCONTINUED | OUTPATIENT
Start: 2023-02-17 | End: 2023-02-17

## 2023-02-17 RX ORDER — HYDROMORPHONE HYDROCHLORIDE 2 MG/ML
0.5 INJECTION INTRAMUSCULAR; INTRAVENOUS; SUBCUTANEOUS ONCE
Refills: 0 | Status: DISCONTINUED | OUTPATIENT
Start: 2023-02-17 | End: 2023-02-18

## 2023-02-17 RX ORDER — SODIUM CHLORIDE 9 MG/ML
1000 INJECTION, SOLUTION INTRAVENOUS
Refills: 0 | Status: DISCONTINUED | OUTPATIENT
Start: 2023-02-17 | End: 2023-02-18

## 2023-02-17 RX ORDER — ONDANSETRON 8 MG/1
4 TABLET, FILM COATED ORAL ONCE
Refills: 0 | Status: DISCONTINUED | OUTPATIENT
Start: 2023-02-17 | End: 2023-02-20

## 2023-02-17 RX ORDER — ACETAMINOPHEN 500 MG
1000 TABLET ORAL ONCE
Refills: 0 | Status: DISCONTINUED | OUTPATIENT
Start: 2023-02-17 | End: 2023-02-17

## 2023-02-17 RX ORDER — MAGNESIUM OXIDE 400 MG ORAL TABLET 241.3 MG
800 TABLET ORAL ONCE
Refills: 0 | Status: COMPLETED | OUTPATIENT
Start: 2023-02-17 | End: 2023-02-17

## 2023-02-17 RX ORDER — INSULIN GLARGINE 100 [IU]/ML
20 INJECTION, SOLUTION SUBCUTANEOUS AT BEDTIME
Refills: 0 | Status: DISCONTINUED | OUTPATIENT
Start: 2023-02-17 | End: 2023-02-18

## 2023-02-17 RX ORDER — DEXTROSE 50 % IN WATER 50 %
12.5 SYRINGE (ML) INTRAVENOUS ONCE
Refills: 0 | Status: DISCONTINUED | OUTPATIENT
Start: 2023-02-17 | End: 2023-02-20

## 2023-02-17 RX ORDER — ATORVASTATIN CALCIUM 80 MG/1
40 TABLET, FILM COATED ORAL AT BEDTIME
Refills: 0 | Status: DISCONTINUED | OUTPATIENT
Start: 2023-02-17 | End: 2023-02-20

## 2023-02-17 RX ORDER — DEXTROSE 50 % IN WATER 50 %
15 SYRINGE (ML) INTRAVENOUS ONCE
Refills: 0 | Status: DISCONTINUED | OUTPATIENT
Start: 2023-02-17 | End: 2023-02-20

## 2023-02-17 RX ORDER — GLUCAGON INJECTION, SOLUTION 0.5 MG/.1ML
1 INJECTION, SOLUTION SUBCUTANEOUS ONCE
Refills: 0 | Status: DISCONTINUED | OUTPATIENT
Start: 2023-02-17 | End: 2023-02-20

## 2023-02-17 RX ORDER — ASPIRIN/CALCIUM CARB/MAGNESIUM 324 MG
81 TABLET ORAL DAILY
Refills: 0 | Status: DISCONTINUED | OUTPATIENT
Start: 2023-02-18 | End: 2023-02-20

## 2023-02-17 RX ORDER — INSULIN GLARGINE 100 [IU]/ML
20 INJECTION, SOLUTION SUBCUTANEOUS AT BEDTIME
Refills: 0 | Status: DISCONTINUED | OUTPATIENT
Start: 2023-02-17 | End: 2023-02-17

## 2023-02-17 RX ORDER — DEXTROSE 50 % IN WATER 50 %
25 SYRINGE (ML) INTRAVENOUS ONCE
Refills: 0 | Status: DISCONTINUED | OUTPATIENT
Start: 2023-02-17 | End: 2023-02-20

## 2023-02-17 RX ORDER — INSULIN GLARGINE 100 [IU]/ML
20 INJECTION, SOLUTION SUBCUTANEOUS ONCE
Refills: 0 | Status: COMPLETED | OUTPATIENT
Start: 2023-02-17 | End: 2023-02-17

## 2023-02-17 RX ORDER — PANTOPRAZOLE SODIUM 20 MG/1
40 TABLET, DELAYED RELEASE ORAL
Refills: 0 | Status: DISCONTINUED | OUTPATIENT
Start: 2023-02-17 | End: 2023-02-20

## 2023-02-17 RX ORDER — SENNA PLUS 8.6 MG/1
2 TABLET ORAL AT BEDTIME
Refills: 0 | Status: DISCONTINUED | OUTPATIENT
Start: 2023-02-17 | End: 2023-02-20

## 2023-02-17 RX ORDER — BNT162B2 ORIGINAL AND OMICRON BA.4/BA.5 .1125; .1125 MG/2.25ML; MG/2.25ML
0.3 INJECTION, SUSPENSION INTRAMUSCULAR ONCE
Refills: 0 | Status: DISCONTINUED | OUTPATIENT
Start: 2023-02-17 | End: 2023-02-17

## 2023-02-17 RX ORDER — OXYCODONE HYDROCHLORIDE 5 MG/1
5 TABLET ORAL ONCE
Refills: 0 | Status: DISCONTINUED | OUTPATIENT
Start: 2023-02-17 | End: 2023-02-18

## 2023-02-17 RX ORDER — CEFAZOLIN SODIUM 1 G
2000 VIAL (EA) INJECTION EVERY 8 HOURS
Refills: 0 | Status: COMPLETED | OUTPATIENT
Start: 2023-02-17 | End: 2023-02-18

## 2023-02-17 RX ORDER — INSULIN LISPRO 100/ML
VIAL (ML) SUBCUTANEOUS
Refills: 0 | Status: DISCONTINUED | OUTPATIENT
Start: 2023-02-17 | End: 2023-02-20

## 2023-02-17 RX ADMIN — LISINOPRIL 40 MILLIGRAM(S): 2.5 TABLET ORAL at 06:50

## 2023-02-17 RX ADMIN — INSULIN GLARGINE 20 UNIT(S): 100 INJECTION, SOLUTION SUBCUTANEOUS at 00:46

## 2023-02-17 RX ADMIN — CHLORHEXIDINE GLUCONATE 1 APPLICATION(S): 213 SOLUTION TOPICAL at 01:00

## 2023-02-17 RX ADMIN — Medication 100 MILLIGRAM(S): at 22:11

## 2023-02-17 RX ADMIN — Medication 100 MILLIGRAM(S): at 15:21

## 2023-02-17 RX ADMIN — CHLORHEXIDINE GLUCONATE 1 APPLICATION(S): 213 SOLUTION TOPICAL at 05:56

## 2023-02-17 RX ADMIN — INSULIN GLARGINE 20 UNIT(S): 100 INJECTION, SOLUTION SUBCUTANEOUS at 22:12

## 2023-02-17 RX ADMIN — SENNA PLUS 2 TABLET(S): 8.6 TABLET ORAL at 22:13

## 2023-02-17 RX ADMIN — Medication 6: at 16:16

## 2023-02-17 RX ADMIN — SODIUM CHLORIDE 3 MILLILITER(S): 9 INJECTION INTRAMUSCULAR; INTRAVENOUS; SUBCUTANEOUS at 05:56

## 2023-02-17 RX ADMIN — Medication 4: at 07:09

## 2023-02-17 RX ADMIN — Medication 650 MILLIGRAM(S): at 06:50

## 2023-02-17 RX ADMIN — INSULIN GLARGINE 20 UNIT(S): 100 INJECTION, SOLUTION SUBCUTANEOUS at 07:12

## 2023-02-17 RX ADMIN — Medication 8 UNIT(S): at 22:19

## 2023-02-17 RX ADMIN — Medication 8 UNIT(S): at 19:02

## 2023-02-17 RX ADMIN — Medication 650 MILLIGRAM(S): at 01:00

## 2023-02-17 RX ADMIN — SODIUM CHLORIDE 3 MILLILITER(S): 9 INJECTION INTRAMUSCULAR; INTRAVENOUS; SUBCUTANEOUS at 00:26

## 2023-02-17 RX ADMIN — MAGNESIUM OXIDE 400 MG ORAL TABLET 800 MILLIGRAM(S): 241.3 TABLET ORAL at 07:55

## 2023-02-17 RX ADMIN — Medication 650 MILLIGRAM(S): at 00:22

## 2023-02-17 RX ADMIN — ATORVASTATIN CALCIUM 40 MILLIGRAM(S): 80 TABLET, FILM COATED ORAL at 22:13

## 2023-02-17 RX ADMIN — PANTOPRAZOLE SODIUM 40 MILLIGRAM(S): 20 TABLET, DELAYED RELEASE ORAL at 06:51

## 2023-02-17 RX ADMIN — INSULIN HUMAN 12 UNIT(S): 100 INJECTION, SOLUTION SUBCUTANEOUS at 00:22

## 2023-02-17 RX ADMIN — Medication 5 MILLIGRAM(S): at 22:12

## 2023-02-17 NOTE — PRE-ANESTHESIA EVALUATION ADULT - NSANTHPEFT_GEN_ALL_CORE
Obese male, bruising over L flank at old incision site (in various stages of healing), otherwise wnl

## 2023-02-17 NOTE — PATIENT PROFILE ADULT - STATED REASON FOR ADMISSION
Fell in bathroom in [place]. Had chest tube placed since had trouble breathing. Had it removed in [place] because he wanted to be taken care of in [place] instead.

## 2023-02-17 NOTE — PATIENT PROFILE ADULT - FUNCTIONAL ASSESSMENT - DAILY ACTIVITY 1.
Pt calls stating she was seen by Lydia Stone on Monday for diarrhea/vomiting she had going on since Saturday and received 2L IV NS and was told to try to continue drinking water and start with bland diet and could try yogurt. Pt states she did drink water after office visit and tried yogurt for dinner Monday and had diarrhea all day Monday and yesterday that was white in color going approx Q4H. Pt states this morning she feels better and has had been able to keep food and water down today and had approx 4 episodes of normal color diarrhea today, pt denies vomiting, pt states she started her period today so unsure if cramping is due to that or from GI problems. Pt states she is calling for recommendations as she is scared to eat much and feels \"blah\" and thinks she needs electrolytes. Pt states she tried powerade Monday which made her stomach upset which she states she thinks is from the sugar. Pt reports glutan intolerance and is being careful what she eats. Pt states she has work trip to [place] Friday and trying to feel better for trip. Please advise any recommendations on how pt should proceed.    3 = A little assistance

## 2023-02-17 NOTE — PRE-OP CHECKLIST - NOTHING BY MOUTH SINCE
17-Feb-2023 00:00 Pt received room 13, alert & awake, A&OX4. Pt complaint of abd pain x1m ago, waxes/wanes, located diffuse throughout abd, a/w dec appetite Reports over last week pain became more persistent andmore severe, a/w loose nonbloody stools. IV 20G, L AC, labs collected & sent, meds given, will continue to monitor

## 2023-02-17 NOTE — PROGRESS NOTE ADULT - ASSESSMENT
72 M hx HTN, HLD, CAD s/p multiple PCI’s (STEVEN OM1 @ [facility] 6/16/21), DM2, presents to ED this morning with retained hemothorax. Pt states he was visiting family in [place] last week and slipped and fell getting out of the shower. States he was found to have a hemothorax and chest tube was placed. Pt was planned for VATS at hospital in [place], but pt wished to have care in [place]. Chest tube was removed, and pt flew back to [facility] yesterday. Pt reports pain to L ribs. Reports some difficutly taking deep breaths. Denies dizziness with ambulation. Pt being admitted to thoracic service for L VATs tomorrow.     Plan:    Neurovascular:   -Pain well controlled with current regimen. PRN's: tylenol, dilaudid    Cardiovascular:   -Hemodynamically stable.   -Monitor: BP, HR, tele  -CAD    -c/w ASA    -hold plavix post op  -HLD    -c/w lipitor    Respiratory:   -Oxygenating well on room air  -Encourage continued use of IS 10x/hr and frequent ambulation      GI:  -GI PPX: protonix  -PO Diet  -Bowel Regimen: dulcolax, senna    Renal / :  -Continue to monitor renal function: BUN/Cr 11/.71  -Monitor I/O's daily     Endocrine:    -No hx of DM or thyroid dx  -A1c: 9.2    -lantus 20    -lispro 8    -ISS  -TSH: 28.9    Hematologic:  -CBC: H/H- 9/28.9  -Coagulation Panel.    ID:  -CBC: WBC- 17  -Continue to observe for SIRS/Sepsis Syndrome.    Prophylaxis:  -DVT prophylaxis held due to bleeding risk 2/2 to hemothorax  -Continue with SCD's b/l while patient is at rest     Disposition:  -OR tomorrow for VATS

## 2023-02-17 NOTE — PATIENT PROFILE ADULT - FALL HARM RISK - HARM RISK INTERVENTIONS
Assistance with ambulation/Assistance OOB with selected safe patient handling equipment/Communicate Risk of Fall with Harm to all staff/Discuss with provider need for PT consult/Monitor gait and stability/Provide patient with walking aids - walker, cane, crutches/Reinforce activity limits and safety measures with patient and family/Tailored Fall Risk Interventions/Use of alarms - bed, chair and/or voice tab/Visual Cue: Yellow wristband and red socks/Bed in lowest position, wheels locked, appropriate side rails in place/Call bell, personal items and telephone in reach/Instruct patient to call for assistance before getting out of bed or chair/Non-slip footwear when patient is out of bed/Hunt to call system/Physically safe environment - no spills, clutter or unnecessary equipment/Purposeful Proactive Rounding/Room/bathroom lighting operational, light cord in reach

## 2023-02-17 NOTE — PRE-ANESTHESIA EVALUATION ADULT - NSANTHPMHFT_GEN_ALL_CORE
Per primary team: This is a 72 year of M with a history of obesity, HTN, HLD, CAD s/p multiple PCI’s (STEVEN OM1 @ [facility] 6/16/21), and DM2, who presents to ED with retained hemothorax. He states he was visiting family in [place] last week and slipped and fell getting out of the shower. He was found to have a hemothorax and chest tube was placed. Chest tube was removed, and patient flew back to [facility]. All other conditions stable. Per primary team: This is a 72 year of M with a history of obesity, HTN, HLD, CAD s/p multiple PCI’s (STEVEN OM1 @ [facility] 6/16/21), and DM2, who presents to ED with retained hemothorax. He states he was visiting family in [place] last week and slipped and fell getting out of the shower. He was found to have a hemothorax and chest tube was placed. Chest tube was removed, and patient flew back to [facility]. All other conditions stable.    Upon speaking to patient, no CP/SOB/N/V/GERD. No numbness, no tingling. METS = 4. He reports L rib pain. No prior issues with anesthesia.

## 2023-02-17 NOTE — PATIENT PROFILE ADULT - NSPRESCRUSEDDRG_GEN_A_NUR
Encounter Date: 3/29/2019    SCRIBE #1 NOTE: I, Klaudia Muller , am scribing for, and in the presence of,  Dr. Thakkar . I have scribed the entire note.       History     Chief Complaint   Patient presents with    Shoulder Pain     reports rt shoulder pain after port deaccessed wednesday 03/29/2019  3:21 PM       The patient is a 56 y.o. female who is presenting with the acute onset of intermittent R sided anterior shoulder and upper chest wall pain that began x 2 days after flushing her port. The pt reports that the pain is constant but worsens with ROM and palpation. The pt reports that the pain extends when the port site to her proximal R upper arm as well as her upper back. No associated swelling to the area, erythema, SOB, nausea, vomiting, or fever. 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 Pertinent PMHx includes arthritis, CA, and thyroid dz. Pertinent past surgical hx includes port placement.     The history is provided by the patient and medical records.     Review of patient's allergies indicates:   Allergen Reactions    Camptosar [irinotecan] Other (See Comments)     Slurred speech     Past Medical History:   Diagnosis Date    Arthritis     Cancer     gallbadder/ liver spots/biopsy    Thyroid disease      Past Surgical History:   Procedure Laterality Date    ARTHROSCOPY, KNEE, WITH Partial lateral MENISCECTOMY Right 7/12/2018    Performed by [facility]y Kiran [facility] at [facility] OR    [id number]-[id number]-hx N/A 11/16/2018    Performed by [facility] Provider at [facility] OR [facility]    BREAST BIOPSY      BREAST SURGERY  2001    right biopsy, benign    CHONDROPLASTY,KNEE Medial Femoral Right 7/12/2018    Performed by H[facility]y Kiran [facility] at [facility] OR    COLONOSCOPY      COLONOSCOPY N/A 12/5/2014    Performed by Sixto Barrera MD at [facility] ENDO    dental implant      [id number]  Right 12/14/2018    Performed by [facility]gen [facility]o [facility] at [facility] OR    KNEE ARTHROSCOPY Right     LIVER BIOPSY  11/16/2018    THYROIDECTOMY  2011    complete     Family History   Problem Relation Age of Onset    Cancer Mother         breast and bone     Breast cancer Mother     Hyperlipidemia Father     ADD / ADHD Daughter         autism    Crohn's disease Paternal Uncle     Heart disease Paternal Uncle         heart transplant     Social History     Tobacco Use    Smoking status: Never Smoker    Smokeless tobacco: Never Used   Substance Use Topics    Alcohol use: Yes     Comment: rarely    Drug use: No     Review of Systems   Constitutional: Negative for fever.   HENT: Negative for sore throat.    Respiratory: Negative for shortness of breath.    Cardiovascular: Positive for chest pain.   Gastrointestinal: Negative for nausea.   Genitourinary: Negative for dysuria.   Musculoskeletal: Positive for arthralgias and myalgias. Negative for back pain.   Skin: Negative for rash.   Neurological: Negative for weakness.   Hematological: Does not bruise/bleed easily.       Physical Exam     Initial Vitals [03/29/19 1510]   BP Pulse Resp Temp SpO2   136/86 93 18 97.7 °F (36.5 °C) 98 %      MAP       --         Physical Exam    Nursing note and vitals reviewed.  Constitutional: She appears well-developed and well-nourished. She is not diaphoretic. No distress.   HENT:   Head: Normocephalic and atraumatic.   Mouth/Throat: Oropharynx is clear and moist.   Eyes: Conjunctivae are normal.   Neck: Normal range of motion. Neck supple.   Cardiovascular: Normal rate, regular rhythm, normal heart sounds and intact distal pulses. Exam reveals no gallop and no friction rub.    No murmur heard.  2+ radial pulses   2+ DP and PT pulses    Pulmonary/Chest: Breath sounds normal. She has no wheezes. She has no rhonchi. She has no rales. She exhibits tenderness.   Mild TTP of the R anterior chest wall overlying the port insertion site. No  overlying skin changes.    Abdominal: Soft. She exhibits no distension. There is no tenderness.   Musculoskeletal: Normal range of motion. She exhibits tenderness. She exhibits no edema.   No RUE tenderness to palpation with full active ROM at the shoulder without pain.   Neurological: She is alert and oriented to person, place, and time. She has normal strength. No sensory deficit.   Skin: Capillary refill takes less than 2 seconds. No rash noted. No erythema.   Psychiatric: She has a normal mood and affect. Her speech is normal. Thought content normal.         ED Course   Procedures  Labs Reviewed - No data to display       Imaging Results          FL Central Line, Tunnel, Contrast Inj w/ Port or Pump w/Img & Rpt (Final result)  Result time 03/29/19 16:45:55    Final result by Yola Castrejon MD (03/29/19 16:45:55)                 Impression:      Patent right magnolia catheter without obstruction.      Electronically signed by: Yola Castrejon MD  Date:    03/29/2019  Time:    16:45             Narrative:    EXAMINATION:  FL CENTRAL LINE, TUNNEL, CONTRAST INJ W/ PORT OR PUMP W/IMG & RPT    CLINICAL HISTORY:  R chest wall/shoulder pain with port use;    TECHNIQUE:  The patient's magnolia catheter was accessed by the hospital personnel prior to the procedure.  The magnolia catheter was able to be easily aspirated.  The catheter was also able to be easily injected with 3 cc of Omnipaque 350 injected under fluoroscopic observation flowing freely through the catheter into the superior vena cava without obstruction.  The tip of the magnolia catheter is in the SVC.  Fluoroscopy time 0.4 min    COMPARISON:  As above    FINDINGS:  As above                            (rad read)     Medical Decision Making:   History:   Old Medical Records: I decided to obtain old medical records.  Clinical Tests:   Lab Tests: Ordered and Reviewed  Radiological Study: Ordered and Reviewed            Scribe Attestation:   Scribe #1: I performed the  above scribed service and the documentation accurately describes the services I performed. I attest to the accuracy of the note.        I, Dr. Rhys Thakkar, personally performed the services described in this documentation. All medical record entries made by the scribe were at my direction and in my presence.  I have reviewed the chart and agree that the record reflects my personal performance and is accurate and complete. Rhys Thakkar MD.  10:50 PM 03/29/2019    Faith Byers is a 56 y.o. female presenting with pain in the chest wall surrounding patient's port accentuated by previous infusion from Port.  Port is noted to flush well here without pain at a low rate.  Poor study shows no discontinuity within the port or occlusion.  She has no objective findings within the actual shoulder or arm including no asymmetry or objective findings of DVT.  I do not think further emergent ultrasound is indicated.  She is appropriate to follow up as planned as well as with general surgery next week for further recommendations regarding any other issues or testing regarding the port.  No sign of facial plethora or subclavian occlusion.  I have very low suspicion for other emergent intrathoracic process such as PE or ACS.  I do not think other ED diagnostic testing is indicated.  Return precautions reviewed.        Clinical Impression:       ICD-10-CM ICD-9-CM   1. Encounter for care related to vascular access port Z45.2 V58.81         Disposition:   Disposition: Discharged                    Rhys Thakkar MD  03/29/19 3717     No

## 2023-02-17 NOTE — PRE-ANESTHESIA EVALUATION ADULT - NSRADCARDRESULTSFT_GEN_ALL_CORE
CXR with large L sided hemothorax.    TTE 2/15/23:  -----  CONCLUSIONS:     1. Normal left and right ventricular size and systolic function.   2. Mild symmetric left ventricular hypertrophy.   3. Doppler findings are not conclusive but are suggestive of grade I   diastolc dysfunction.   4. No significant valvular disease.   5. No evidence of pulmonary hypertension, pulmonary artery systolic   pressure is 16 mmHg.   6. No pericardial effusion.

## 2023-02-17 NOTE — BRIEF OPERATIVE NOTE - COMMENTS
I first assisted for the entirety of the case, including but not limited to opening, instrumentation, chest tube placement, and closure.

## 2023-02-18 LAB
ANION GAP SERPL CALC-SCNC: 8 MMOL/L — SIGNIFICANT CHANGE UP (ref 5–17)
BASOPHILS # BLD AUTO: 0.01 K/UL — SIGNIFICANT CHANGE UP (ref 0–0.2)
BASOPHILS NFR BLD AUTO: 0.1 % — SIGNIFICANT CHANGE UP (ref 0–2)
BUN SERPL-MCNC: 10 MG/DL — SIGNIFICANT CHANGE UP (ref 7–23)
CALCIUM SERPL-MCNC: 8.2 MG/DL — LOW (ref 8.4–10.5)
CHLORIDE SERPL-SCNC: 101 MMOL/L — SIGNIFICANT CHANGE UP (ref 96–108)
CO2 SERPL-SCNC: 27 MMOL/L — SIGNIFICANT CHANGE UP (ref 22–31)
CREAT SERPL-MCNC: 0.73 MG/DL — SIGNIFICANT CHANGE UP (ref 0.5–1.3)
EGFR: 98 ML/MIN/1.73M2 — SIGNIFICANT CHANGE UP
EOSINOPHIL # BLD AUTO: 0.09 K/UL — SIGNIFICANT CHANGE UP (ref 0–0.5)
EOSINOPHIL NFR BLD AUTO: 0.7 % — SIGNIFICANT CHANGE UP (ref 0–6)
GLUCOSE BLDC GLUCOMTR-MCNC: 250 MG/DL — HIGH (ref 70–99)
GLUCOSE BLDC GLUCOMTR-MCNC: 264 MG/DL — HIGH (ref 70–99)
GLUCOSE BLDC GLUCOMTR-MCNC: 266 MG/DL — HIGH (ref 70–99)
GLUCOSE BLDC GLUCOMTR-MCNC: 290 MG/DL — HIGH (ref 70–99)
GLUCOSE BLDC GLUCOMTR-MCNC: 307 MG/DL — HIGH (ref 70–99)
GLUCOSE BLDC GLUCOMTR-MCNC: 362 MG/DL — HIGH (ref 70–99)
GLUCOSE SERPL-MCNC: 292 MG/DL — HIGH (ref 70–99)
HCT VFR BLD CALC: 30.4 % — LOW (ref 39–50)
HGB BLD-MCNC: 9.1 G/DL — LOW (ref 13–17)
IMM GRANULOCYTES NFR BLD AUTO: 0.5 % — SIGNIFICANT CHANGE UP (ref 0–0.9)
LYMPHOCYTES # BLD AUTO: 16.8 % — SIGNIFICANT CHANGE UP (ref 13–44)
LYMPHOCYTES # BLD AUTO: 2.2 K/UL — SIGNIFICANT CHANGE UP (ref 1–3.3)
MAGNESIUM SERPL-MCNC: 1.8 MG/DL — SIGNIFICANT CHANGE UP (ref 1.6–2.6)
MCHC RBC-ENTMCNC: 25.1 PG — LOW (ref 27–34)
MCHC RBC-ENTMCNC: 29.9 GM/DL — LOW (ref 32–36)
MCV RBC AUTO: 84 FL — SIGNIFICANT CHANGE UP (ref 80–100)
MONOCYTES # BLD AUTO: 1.42 K/UL — HIGH (ref 0–0.9)
MONOCYTES NFR BLD AUTO: 10.8 % — SIGNIFICANT CHANGE UP (ref 2–14)
NEUTROPHILS # BLD AUTO: 9.3 K/UL — HIGH (ref 1.8–7.4)
NEUTROPHILS NFR BLD AUTO: 71.1 % — SIGNIFICANT CHANGE UP (ref 43–77)
NRBC # BLD: 0 /100 WBCS — SIGNIFICANT CHANGE UP (ref 0–0)
PLATELET # BLD AUTO: 417 K/UL — HIGH (ref 150–400)
POTASSIUM SERPL-MCNC: 5.1 MMOL/L — SIGNIFICANT CHANGE UP (ref 3.5–5.3)
POTASSIUM SERPL-SCNC: 5.1 MMOL/L — SIGNIFICANT CHANGE UP (ref 3.5–5.3)
RBC # BLD: 3.62 M/UL — LOW (ref 4.2–5.8)
RBC # FLD: 14.4 % — SIGNIFICANT CHANGE UP (ref 10.3–14.5)
SODIUM SERPL-SCNC: 136 MMOL/L — SIGNIFICANT CHANGE UP (ref 135–145)
WBC # BLD: 13.09 K/UL — HIGH (ref 3.8–10.5)
WBC # FLD AUTO: 13.09 K/UL — HIGH (ref 3.8–10.5)

## 2023-02-18 PROCEDURE — 99232 SBSQ HOSP IP/OBS MODERATE 35: CPT | Mod: 24

## 2023-02-18 PROCEDURE — 99231 SBSQ HOSP IP/OBS SF/LOW 25: CPT

## 2023-02-18 PROCEDURE — 71045 X-RAY EXAM CHEST 1 VIEW: CPT | Mod: 26

## 2023-02-18 RX ORDER — INSULIN LISPRO 100/ML
10 VIAL (ML) SUBCUTANEOUS
Refills: 0 | Status: DISCONTINUED | OUTPATIENT
Start: 2023-02-18 | End: 2023-02-18

## 2023-02-18 RX ORDER — ACETAMINOPHEN 500 MG
650 TABLET ORAL EVERY 6 HOURS
Refills: 0 | Status: DISCONTINUED | OUTPATIENT
Start: 2023-02-18 | End: 2023-02-20

## 2023-02-18 RX ORDER — OXYCODONE HYDROCHLORIDE 5 MG/1
10 TABLET ORAL EVERY 6 HOURS
Refills: 0 | Status: DISCONTINUED | OUTPATIENT
Start: 2023-02-18 | End: 2023-02-20

## 2023-02-18 RX ORDER — INSULIN LISPRO 100/ML
12 VIAL (ML) SUBCUTANEOUS
Refills: 0 | Status: DISCONTINUED | OUTPATIENT
Start: 2023-02-18 | End: 2023-02-20

## 2023-02-18 RX ORDER — POLYETHYLENE GLYCOL 3350 17 G/17G
17 POWDER, FOR SOLUTION ORAL DAILY
Refills: 0 | Status: DISCONTINUED | OUTPATIENT
Start: 2023-02-18 | End: 2023-02-20

## 2023-02-18 RX ORDER — INSULIN GLARGINE 100 [IU]/ML
30 INJECTION, SOLUTION SUBCUTANEOUS AT BEDTIME
Refills: 0 | Status: DISCONTINUED | OUTPATIENT
Start: 2023-02-18 | End: 2023-02-20

## 2023-02-18 RX ORDER — INSULIN LISPRO 100/ML
VIAL (ML) SUBCUTANEOUS AT BEDTIME
Refills: 0 | Status: DISCONTINUED | OUTPATIENT
Start: 2023-02-18 | End: 2023-02-20

## 2023-02-18 RX ORDER — OXYCODONE HYDROCHLORIDE 5 MG/1
5 TABLET ORAL EVERY 6 HOURS
Refills: 0 | Status: DISCONTINUED | OUTPATIENT
Start: 2023-02-18 | End: 2023-02-20

## 2023-02-18 RX ORDER — MAGNESIUM SULFATE 500 MG/ML
1 VIAL (ML) INJECTION ONCE
Refills: 0 | Status: COMPLETED | OUTPATIENT
Start: 2023-02-18 | End: 2023-02-18

## 2023-02-18 RX ADMIN — Medication 5 MILLIGRAM(S): at 21:11

## 2023-02-18 RX ADMIN — Medication 12 UNIT(S): at 18:54

## 2023-02-18 RX ADMIN — Medication 6: at 21:55

## 2023-02-18 RX ADMIN — HYDROMORPHONE HYDROCHLORIDE 0.5 MILLIGRAM(S): 2 INJECTION INTRAMUSCULAR; INTRAVENOUS; SUBCUTANEOUS at 05:20

## 2023-02-18 RX ADMIN — Medication 100 MILLIGRAM(S): at 05:20

## 2023-02-18 RX ADMIN — SENNA PLUS 2 TABLET(S): 8.6 TABLET ORAL at 21:11

## 2023-02-18 RX ADMIN — Medication 6: at 12:51

## 2023-02-18 RX ADMIN — ATORVASTATIN CALCIUM 40 MILLIGRAM(S): 80 TABLET, FILM COATED ORAL at 21:11

## 2023-02-18 RX ADMIN — PANTOPRAZOLE SODIUM 40 MILLIGRAM(S): 20 TABLET, DELAYED RELEASE ORAL at 06:47

## 2023-02-18 RX ADMIN — OXYCODONE HYDROCHLORIDE 5 MILLIGRAM(S): 5 TABLET ORAL at 03:45

## 2023-02-18 RX ADMIN — Medication 100 GRAM(S): at 13:24

## 2023-02-18 RX ADMIN — Medication 81 MILLIGRAM(S): at 11:18

## 2023-02-18 RX ADMIN — INSULIN GLARGINE 30 UNIT(S): 100 INJECTION, SOLUTION SUBCUTANEOUS at 21:55

## 2023-02-18 RX ADMIN — OXYCODONE HYDROCHLORIDE 10 MILLIGRAM(S): 5 TABLET ORAL at 11:48

## 2023-02-18 RX ADMIN — Medication 4: at 17:15

## 2023-02-18 RX ADMIN — OXYCODONE HYDROCHLORIDE 10 MILLIGRAM(S): 5 TABLET ORAL at 11:18

## 2023-02-18 RX ADMIN — OXYCODONE HYDROCHLORIDE 10 MILLIGRAM(S): 5 TABLET ORAL at 21:11

## 2023-02-18 RX ADMIN — Medication 6: at 06:47

## 2023-02-18 RX ADMIN — Medication 8 UNIT(S): at 06:47

## 2023-02-18 NOTE — PROGRESS NOTE ADULT - ASSESSMENT
Diabetes, sugars suboptimal.  s/p VATS for hemothorax.  Hyperglycemia due to pain/stress and he is also getting less insulin than he normally gives himself at home.  Increase glargine to 30 units and lispro to 12 units TID (50% increase) and will monitor sugars.  Discussed importance of glucose control to promote proper wound healing and prevent infection.    Pt can follow up at discharge with [facility] by calling  to make an appointment.

## 2023-02-18 NOTE — PROGRESS NOTE ADULT - ASSESSMENT
72 M hx HTN, HLD, CAD s/p multiple PCI’s (STEVEN OM1 @ [facility] 6/16/21), DM2, presented to the ED on 2/16/23 with retained hemothorax. Pt states he was visiting family in [place] last week and slipped and fell getting out of the shower. States he was found to have a hemothorax and chest tube was placed. Pt was planned for VATS at hospital in [place], but pt wished to have care in [place]. Chest tube was removed, and pt flew back to [facility] yesterday. Pt reports pain to L ribs. Reports some difficutly taking deep breaths. Denies dizziness with ambulation. Pt being admitted to thoracic service for L VATs tomorrow.     Neurovascular  #Postoperative pain  - No delirium. Pain well controlled with current regimen.  - Continue *** PRN    Cardiovascular   - POD_ s/p _  - Hemodynamically stable. HR controlled (X-X)  - Hx of HTN, BP controlled (X-X)  - ASA, Plavix, BB, statin  - EKG. TTE. Cardiac Panel. Lipid Panel. BNP.      Respiratory  #Postoperative pulmonary insufficiency  - 02 Sat = 98% on RA.  - If on oxygen wean to RA from for O2 Sat > 93%.  - Encourage C+DB and Use of IS 10x / hr while awake.  - CXR.    GI   - Stable.  - Tolerating *** diet  - Protonix for GI prophylaxis    Renal/  - BUN/Cr stable at X/X  - Lasix ***  - Continue to monitor renal function.  - Monitor I/O's  - Replete electrolytes PRN    Endocrine    - A1c.  - TSH.    Hematologic  #Postoperative anemia  - H/H stable at X/X with no obvious signs of bleeding  - PTT  - INR    ID:  - Pt remains afebrile with no elevation in WBC or signs of infection  - Continue to monitor CBC  - Observe for SIRS/Sepsis Syndrome.    Prophylaxis:  - DVT prophylaxis with 5000 SubQ Heparin q8h.  - SCD's    Disposition:  - Home when medically appropriate.   72 M hx HTN, HLD, CAD s/p multiple PCI’s (STEVEN OM1 @ [facility] 6/16/21), DM2, presented to the ED on 2/16/23 with retained hemothorax. Pt states he was visiting family in [place] last week and slipped and fell getting out of the shower. States he was found to have a hemothorax and chest tube was placed. Pt was planned for VATS at hospital in [place], but pt wished to have care in [place]. Chest tube was removed, and pt flew back to [facility] yesterday. Pt reports pain to L ribs. Reports some difficutly taking deep breaths. Denies dizziness with ambulation. Pt being admitted to thoracic service for L VATs tomorrow.     Neurovascular  #Postoperative pain  - No delirium. Pain well controlled with current regimen.  - Continue oxycodone 10/5 and tylenol PRN    Cardiovascular   #HTN  #HLD  #CAD multiple PCIs  - On ASA only  - Holding Plavix in post-op period  - Continue Lipitor 40  - Holding home Ramipril    Respiratory  #Hemothorax now POD 1 from evacuation of hemothorax and washout  - 02 Sat = 96% on RA.  - If on oxygen wean to RA from for O2 Sat > 93%.  - Encourage C+DB and Use of IS 10x / hr while awake.  - CXR stable postoperative changes, no pneumothorax  - Has 2 left sided chest tubes, will evaluate if any can be removed today. For now, both on waterseal    GI   - Stable.  - Tolerating DASH diet  - Protonix for GI prophylaxis    Renal/  - BUN/Cr stable at 10/0.73  - Continue to monitor renal function.  - Monitor I/O's  - Replete electrolytes PRN    Endocrine    #Uncontrolled Type 2 DM  - A1C 9.2  - Endocrine following appreciate recs  - Sugars 300s today. Increased glargine to 30 and increased lispro to 12  - TSH 1.24    Hematologic  #Postoperative anemia  #Hemothorax s/p OR  - H/H stable at 9.1/30.4 no obvious signs of bleeding  - Coags stable    ID:  - Pt remains afebrile with no elevation in WBC or signs of infection  - Continue to monitor CBC  - Observe for SIRS/Sepsis Syndrome.    Prophylaxis:  - SQH HELD FOR HEMOTHORAX  - SCD's    Disposition:  - Home when medically appropriate.

## 2023-02-19 LAB
ANION GAP SERPL CALC-SCNC: 8 MMOL/L — SIGNIFICANT CHANGE UP (ref 5–17)
BUN SERPL-MCNC: 10 MG/DL — SIGNIFICANT CHANGE UP (ref 7–23)
CALCIUM SERPL-MCNC: 8.2 MG/DL — LOW (ref 8.4–10.5)
CHLORIDE SERPL-SCNC: 97 MMOL/L — SIGNIFICANT CHANGE UP (ref 96–108)
CO2 SERPL-SCNC: 28 MMOL/L — SIGNIFICANT CHANGE UP (ref 22–31)
CREAT SERPL-MCNC: 0.72 MG/DL — SIGNIFICANT CHANGE UP (ref 0.5–1.3)
EGFR: 98 ML/MIN/1.73M2 — SIGNIFICANT CHANGE UP
GLUCOSE BLDC GLUCOMTR-MCNC: 100 MG/DL — HIGH (ref 70–99)
GLUCOSE BLDC GLUCOMTR-MCNC: 237 MG/DL — HIGH (ref 70–99)
GLUCOSE BLDC GLUCOMTR-MCNC: 270 MG/DL — HIGH (ref 70–99)
GLUCOSE BLDC GLUCOMTR-MCNC: 350 MG/DL — HIGH (ref 70–99)
GLUCOSE SERPL-MCNC: 341 MG/DL — HIGH (ref 70–99)
HCT VFR BLD CALC: 30.3 % — LOW (ref 39–50)
HGB BLD-MCNC: 9.4 G/DL — LOW (ref 13–17)
MAGNESIUM SERPL-MCNC: 1.6 MG/DL — SIGNIFICANT CHANGE UP (ref 1.6–2.6)
MCHC RBC-ENTMCNC: 25.3 PG — LOW (ref 27–34)
MCHC RBC-ENTMCNC: 31 GM/DL — LOW (ref 32–36)
MCV RBC AUTO: 81.7 FL — SIGNIFICANT CHANGE UP (ref 80–100)
NRBC # BLD: 0 /100 WBCS — SIGNIFICANT CHANGE UP (ref 0–0)
PLATELET # BLD AUTO: 423 K/UL — HIGH (ref 150–400)
POTASSIUM SERPL-MCNC: 4.2 MMOL/L — SIGNIFICANT CHANGE UP (ref 3.5–5.3)
POTASSIUM SERPL-SCNC: 4.2 MMOL/L — SIGNIFICANT CHANGE UP (ref 3.5–5.3)
RBC # BLD: 3.71 M/UL — LOW (ref 4.2–5.8)
RBC # FLD: 14.3 % — SIGNIFICANT CHANGE UP (ref 10.3–14.5)
SODIUM SERPL-SCNC: 133 MMOL/L — LOW (ref 135–145)
WBC # BLD: 10.74 K/UL — HIGH (ref 3.8–10.5)
WBC # FLD AUTO: 10.74 K/UL — HIGH (ref 3.8–10.5)

## 2023-02-19 PROCEDURE — 71045 X-RAY EXAM CHEST 1 VIEW: CPT | Mod: 26,76

## 2023-02-19 RX ORDER — MAGNESIUM SULFATE 500 MG/ML
2 VIAL (ML) INJECTION ONCE
Refills: 0 | Status: COMPLETED | OUTPATIENT
Start: 2023-02-19 | End: 2023-02-19

## 2023-02-19 RX ADMIN — PANTOPRAZOLE SODIUM 40 MILLIGRAM(S): 20 TABLET, DELAYED RELEASE ORAL at 06:13

## 2023-02-19 RX ADMIN — Medication 25 GRAM(S): at 19:45

## 2023-02-19 RX ADMIN — ATORVASTATIN CALCIUM 40 MILLIGRAM(S): 80 TABLET, FILM COATED ORAL at 21:28

## 2023-02-19 RX ADMIN — OXYCODONE HYDROCHLORIDE 10 MILLIGRAM(S): 5 TABLET ORAL at 19:18

## 2023-02-19 RX ADMIN — Medication 650 MILLIGRAM(S): at 06:55

## 2023-02-19 RX ADMIN — HYDROMORPHONE HYDROCHLORIDE 0.5 MILLIGRAM(S): 2 INJECTION INTRAMUSCULAR; INTRAVENOUS; SUBCUTANEOUS at 06:55

## 2023-02-19 RX ADMIN — OXYCODONE HYDROCHLORIDE 10 MILLIGRAM(S): 5 TABLET ORAL at 05:27

## 2023-02-19 RX ADMIN — Medication 6: at 06:15

## 2023-02-19 RX ADMIN — INSULIN GLARGINE 30 UNIT(S): 100 INJECTION, SOLUTION SUBCUTANEOUS at 21:27

## 2023-02-19 RX ADMIN — Medication 12 UNIT(S): at 06:16

## 2023-02-19 RX ADMIN — Medication 12 UNIT(S): at 17:13

## 2023-02-19 RX ADMIN — Medication 81 MILLIGRAM(S): at 11:14

## 2023-02-19 RX ADMIN — Medication 8: at 16:09

## 2023-02-19 RX ADMIN — OXYCODONE HYDROCHLORIDE 10 MILLIGRAM(S): 5 TABLET ORAL at 20:40

## 2023-02-19 RX ADMIN — Medication 5 MILLIGRAM(S): at 21:28

## 2023-02-19 RX ADMIN — OXYCODONE HYDROCHLORIDE 5 MILLIGRAM(S): 5 TABLET ORAL at 06:55

## 2023-02-19 RX ADMIN — OXYCODONE HYDROCHLORIDE 10 MILLIGRAM(S): 5 TABLET ORAL at 06:55

## 2023-02-19 RX ADMIN — SENNA PLUS 2 TABLET(S): 8.6 TABLET ORAL at 21:28

## 2023-02-19 RX ADMIN — POLYETHYLENE GLYCOL 3350 17 GRAM(S): 17 POWDER, FOR SOLUTION ORAL at 11:14

## 2023-02-19 NOTE — CHART NOTE - NSCHARTNOTEFT_GEN_A_CORE
Chest tubes drained minimal amount on waterseal. Explained to patient our team wanted to remove 1 tube (tube #2) he adamantly refused, saying he was too tired and wants to wait until tomorrow. Dr. Bennett aware
Patient seen and examined with Dr. Bennett this morning. Minimal drainage coming from chest tubes. Both chest tubes removed without incidence. Tie downs secured in place, and occlusive dressing applied. Post removal CXR stable with no obvious pneumothorax
Patient seen and examined. Doing well. Plan for water seal CTs later today.
Patient seen and examined. Plan to dc chest tubes.

## 2023-02-19 NOTE — PROGRESS NOTE ADULT - ASSESSMENT
72 M hx HTN, HLD, CAD s/p multiple PCI’s (STEVEN OM1 @ [facility] 6/16/21), DM2, presented to the ED on 2/16/23 with retained hemothorax. Pt states he was visiting family in [place] last week and slipped and fell getting out of the shower. States he was found to have a hemothorax and chest tube was placed. Pt was planned for VATS at hospital in [place], but pt wished to have care in [place]. Chest tube was removed, and pt flew back to [facility] yesterday. Pt reports pain to L ribs. Reports some difficutly taking deep breaths. Denies dizziness with ambulation. Pt being admitted to thoracic service for L VATs tomorrow.     Neurovascular  #Postoperative pain  - No delirium. Pain well controlled with current regimen.  - Continue oxycodone 10/5 and tylenol PRN    Cardiovascular   #HTN  #HLD  #CAD multiple PCIs  - On ASA only  - Holding Plavix in post-op period  - Continue Lipitor 40  - Holding home Ramipril    Respiratory  #Hemothorax now POD 2 from evacuation of hemothorax and washout  - 02 Sat = 96% on RA.  - If on oxygen wean to RA from for O2 Sat > 93%.  - Encourage C+DB and Use of IS 10x / hr while awake.  - CXR stable postoperative changes, no pneumothorax  - Both left sided chest tubes removed on 2/19, small residual apical pneumothorax. Will repeat CXR in AM    GI   - Stable.  - Tolerating DASH diet  - Protonix for GI prophylaxis    Renal/  - BUN/Cr stable   - Continue to monitor renal function.  - Monitor I/O's  - Replete electrolytes PRN    Endocrine    #Uncontrolled Type 2 DM  - A1C 9.2  - Endocrine following appreciate recs  - Sugars still elevated, 1 . Spoke with endocrine, kept recs the same. glargine to 30 and lispro to 12  - TSH 1.24    Hematologic  #Postoperative anemia  #Hemothorax s/p OR  - H/H stable at 9.4/30.3 no obvious signs of bleeding  - Coags stable    ID:  - Pt remains afebrile with no elevation in WBC or signs of infection  - Continue to monitor CBC  - Observe for SIRS/Sepsis Syndrome.    Prophylaxis:  - SQH HELD FOR HEMOTHORAX  - SCD's    Disposition:  - Home when medically appropriate, likely 2/20  - Needs better glucose control

## 2023-02-20 ENCOUNTER — TRANSCRIPTION ENCOUNTER (OUTPATIENT)
Age: 73
End: 2023-02-20

## 2023-02-20 VITALS
RESPIRATION RATE: 16 BRPM | HEART RATE: 108 BPM | DIASTOLIC BLOOD PRESSURE: 61 MMHG | OXYGEN SATURATION: 100 % | SYSTOLIC BLOOD PRESSURE: 130 MMHG

## 2023-02-20 LAB
ANION GAP SERPL CALC-SCNC: 9 MMOL/L — SIGNIFICANT CHANGE UP (ref 5–17)
BUN SERPL-MCNC: 11 MG/DL — SIGNIFICANT CHANGE UP (ref 7–23)
CALCIUM SERPL-MCNC: 8.4 MG/DL — SIGNIFICANT CHANGE UP (ref 8.4–10.5)
CHLORIDE SERPL-SCNC: 97 MMOL/L — SIGNIFICANT CHANGE UP (ref 96–108)
CO2 SERPL-SCNC: 27 MMOL/L — SIGNIFICANT CHANGE UP (ref 22–31)
CREAT SERPL-MCNC: 0.73 MG/DL — SIGNIFICANT CHANGE UP (ref 0.5–1.3)
EGFR: 98 ML/MIN/1.73M2 — SIGNIFICANT CHANGE UP
GLUCOSE BLDC GLUCOMTR-MCNC: 314 MG/DL — HIGH (ref 70–99)
GLUCOSE BLDC GLUCOMTR-MCNC: 418 MG/DL — HIGH (ref 70–99)
GLUCOSE BLDC GLUCOMTR-MCNC: 423 MG/DL — HIGH (ref 70–99)
GLUCOSE SERPL-MCNC: 289 MG/DL — HIGH (ref 70–99)
HCT VFR BLD CALC: 30.8 % — LOW (ref 39–50)
HGB BLD-MCNC: 9.4 G/DL — LOW (ref 13–17)
MAGNESIUM SERPL-MCNC: 1.9 MG/DL — SIGNIFICANT CHANGE UP (ref 1.6–2.6)
MCHC RBC-ENTMCNC: 24.5 PG — LOW (ref 27–34)
MCHC RBC-ENTMCNC: 30.5 GM/DL — LOW (ref 32–36)
MCV RBC AUTO: 80.4 FL — SIGNIFICANT CHANGE UP (ref 80–100)
NRBC # BLD: 0 /100 WBCS — SIGNIFICANT CHANGE UP (ref 0–0)
PLATELET # BLD AUTO: 435 K/UL — HIGH (ref 150–400)
POTASSIUM SERPL-MCNC: 4.5 MMOL/L — SIGNIFICANT CHANGE UP (ref 3.5–5.3)
POTASSIUM SERPL-SCNC: 4.5 MMOL/L — SIGNIFICANT CHANGE UP (ref 3.5–5.3)
RBC # BLD: 3.83 M/UL — LOW (ref 4.2–5.8)
RBC # FLD: 14.1 % — SIGNIFICANT CHANGE UP (ref 10.3–14.5)
SODIUM SERPL-SCNC: 133 MMOL/L — LOW (ref 135–145)
WBC # BLD: 11.12 K/UL — HIGH (ref 3.8–10.5)
WBC # FLD AUTO: 11.12 K/UL — HIGH (ref 3.8–10.5)

## 2023-02-20 PROCEDURE — 87635 SARS-COV-2 COVID-19 AMP PRB: CPT

## 2023-02-20 PROCEDURE — 85610 PROTHROMBIN TIME: CPT

## 2023-02-20 PROCEDURE — 86850 RBC ANTIBODY SCREEN: CPT

## 2023-02-20 PROCEDURE — 84443 ASSAY THYROID STIM HORMONE: CPT

## 2023-02-20 PROCEDURE — C1889: CPT

## 2023-02-20 PROCEDURE — 86900 BLOOD TYPING SEROLOGIC ABO: CPT

## 2023-02-20 PROCEDURE — 99231 SBSQ HOSP IP/OBS SF/LOW 25: CPT

## 2023-02-20 PROCEDURE — 80048 BASIC METABOLIC PNL TOTAL CA: CPT

## 2023-02-20 PROCEDURE — 85730 THROMBOPLASTIN TIME PARTIAL: CPT

## 2023-02-20 PROCEDURE — 83735 ASSAY OF MAGNESIUM: CPT

## 2023-02-20 PROCEDURE — 71260 CT THORAX DX C+: CPT | Mod: MA

## 2023-02-20 PROCEDURE — 99285 EMERGENCY DEPT VISIT HI MDM: CPT | Mod: 25

## 2023-02-20 PROCEDURE — 86901 BLOOD TYPING SEROLOGIC RH(D): CPT

## 2023-02-20 PROCEDURE — 85025 COMPLETE CBC W/AUTO DIFF WBC: CPT

## 2023-02-20 PROCEDURE — 82962 GLUCOSE BLOOD TEST: CPT

## 2023-02-20 PROCEDURE — 85027 COMPLETE CBC AUTOMATED: CPT

## 2023-02-20 PROCEDURE — 36415 COLL VENOUS BLD VENIPUNCTURE: CPT

## 2023-02-20 PROCEDURE — 71046 X-RAY EXAM CHEST 2 VIEWS: CPT

## 2023-02-20 PROCEDURE — 81003 URINALYSIS AUTO W/O SCOPE: CPT

## 2023-02-20 PROCEDURE — 80053 COMPREHEN METABOLIC PANEL: CPT

## 2023-02-20 PROCEDURE — 96374 THER/PROPH/DIAG INJ IV PUSH: CPT

## 2023-02-20 PROCEDURE — 80061 LIPID PANEL: CPT

## 2023-02-20 PROCEDURE — C9399: CPT

## 2023-02-20 PROCEDURE — 86923 COMPATIBILITY TEST ELECTRIC: CPT

## 2023-02-20 PROCEDURE — 71045 X-RAY EXAM CHEST 1 VIEW: CPT

## 2023-02-20 PROCEDURE — 93306 TTE W/DOPPLER COMPLETE: CPT

## 2023-02-20 PROCEDURE — 83036 HEMOGLOBIN GLYCOSYLATED A1C: CPT

## 2023-02-20 RX ORDER — METFORMIN HYDROCHLORIDE 850 MG/1
1 TABLET ORAL
Qty: 60 | Refills: 0
Start: 2023-02-20 | End: 2023-03-21

## 2023-02-20 RX ORDER — PANTOPRAZOLE SODIUM 20 MG/1
1 TABLET, DELAYED RELEASE ORAL
Qty: 30 | Refills: 0
Start: 2023-02-20 | End: 2023-03-21

## 2023-02-20 RX ORDER — INSULIN ASPART 100 [IU]/ML
15 INJECTION, SOLUTION SUBCUTANEOUS
Qty: 1 | Refills: 0
Start: 2023-02-20 | End: 2023-03-21

## 2023-02-20 RX ORDER — INSULIN GLARGINE 100 [IU]/ML
20 INJECTION, SOLUTION SUBCUTANEOUS
Qty: 0 | Refills: 0 | DISCHARGE

## 2023-02-20 RX ORDER — OXYCODONE HYDROCHLORIDE 5 MG/1
1 TABLET ORAL
Qty: 12 | Refills: 0
Start: 2023-02-20 | End: 2023-02-22

## 2023-02-20 RX ORDER — GLIMEPIRIDE 1 MG
1 TABLET ORAL
Qty: 0 | Refills: 0 | DISCHARGE

## 2023-02-20 RX ORDER — INSULIN LISPRO 100/ML
15 VIAL (ML) SUBCUTANEOUS
Qty: 1 | Refills: 0
Start: 2023-02-20 | End: 2023-03-21

## 2023-02-20 RX ORDER — SITAGLIPTIN AND METFORMIN HYDROCHLORIDE 500; 50 MG/1; MG/1
1 TABLET, FILM COATED ORAL
Qty: 0 | Refills: 0 | DISCHARGE

## 2023-02-20 RX ORDER — INSULIN GLARGINE 100 [IU]/ML
20 INJECTION, SOLUTION SUBCUTANEOUS
Qty: 1 | Refills: 0 | DISCHARGE
Start: 2023-02-20 | End: 2023-03-21

## 2023-02-20 RX ORDER — ACETAMINOPHEN 500 MG
2 TABLET ORAL
Qty: 50 | Refills: 0
Start: 2023-02-20

## 2023-02-20 RX ORDER — INSULIN GLARGINE 100 [IU]/ML
40 INJECTION, SOLUTION SUBCUTANEOUS
Qty: 1 | Refills: 0
Start: 2023-02-20 | End: 2023-03-21

## 2023-02-20 RX ORDER — POLYETHYLENE GLYCOL 3350 17 G/17G
17 POWDER, FOR SOLUTION ORAL
Qty: 170 | Refills: 0
Start: 2023-02-20 | End: 2023-03-01

## 2023-02-20 RX ADMIN — OXYCODONE HYDROCHLORIDE 10 MILLIGRAM(S): 5 TABLET ORAL at 07:20

## 2023-02-20 RX ADMIN — Medication 8: at 07:19

## 2023-02-20 RX ADMIN — Medication 12 UNIT(S): at 12:25

## 2023-02-20 RX ADMIN — Medication 81 MILLIGRAM(S): at 11:18

## 2023-02-20 RX ADMIN — Medication 12: at 12:24

## 2023-02-20 RX ADMIN — Medication 650 MILLIGRAM(S): at 11:18

## 2023-02-20 RX ADMIN — POLYETHYLENE GLYCOL 3350 17 GRAM(S): 17 POWDER, FOR SOLUTION ORAL at 11:18

## 2023-02-20 RX ADMIN — OXYCODONE HYDROCHLORIDE 10 MILLIGRAM(S): 5 TABLET ORAL at 08:20

## 2023-02-20 RX ADMIN — PANTOPRAZOLE SODIUM 40 MILLIGRAM(S): 20 TABLET, DELAYED RELEASE ORAL at 07:20

## 2023-02-20 RX ADMIN — Medication 650 MILLIGRAM(S): at 11:50

## 2023-02-20 RX ADMIN — Medication 12 UNIT(S): at 07:19

## 2023-02-20 NOTE — DISCHARGE NOTE PROVIDER - CARE PROVIDER_API CALL
Wallace Bennett (MD; MPH)  Surgery; Thoracic Surgery  [address] [place] [address], [address] [place] [address]  Phone: [phone]  Fax: [phone]  Follow Up Time: 1 week    [facility],   Phone: [phone]  Fax: (   )    -  Follow Up Time: 2 weeks

## 2023-02-20 NOTE — DISCHARGE NOTE PROVIDER - NSDCFUADDAPPT_GEN_ALL_CORE_FT
Our office will call you with your scheduled appointment with Dr. Bennett, but if you do not hear from us by wednesday, please call [phone]    Please call [facility] for an appointment in 2 weeks.

## 2023-02-20 NOTE — PROGRESS NOTE ADULT - SUBJECTIVE AND OBJECTIVE BOX
Patient discussed on morning rounds with Dr. Bennett    Operation / Date:  L hemothorax evacuation    SUBJECTIVE ASSESSMENT:  72y Male seen and examined at bedside this morning. He is feeling better, offers no complaints. Denies CP/SOB/N/V/D/dizziness/cough/fever/chills.        Vital Signs Last 24 Hrs  T(C): 36.7 (2023 13:35), Max: 36.7 (2023 01:10)  T(F): 98.1 (2023 13:35), Max: 98.1 (2023 13:35)  HR: 95 (2023 12:20) (92 - 102)  BP: 116/58 (2023 12:20) (110/55 - 123/60)  BP(mean): 78 (2023 12:20) (77 - 91)  RR: 16 (2023 12:20) (16 - 18)  SpO2: 96% (2023 12:20) (92% - 97%)    Parameters below as of 2023 12:20  Patient On (Oxygen Delivery Method): room air      I&O's Detail    2023 07:01  -  2023 07:00  --------------------------------------------------------  IN:    IV PiggyBack: 50 mL    Lactated Ringers: 150 mL  Total IN: 200 mL    OUT:    Chest Tube (mL): 130 mL    Chest Tube (mL): 290 mL    Voided (mL): 525 mL  Total OUT: 945 mL    Total NET: -745 mL          CHEST TUBE:  Yes 2 chest tubes, both on waterseal with no air leak  INDIA DRAIN:  no  EPICARDIAL WIRES: no  TIE DOWNS: no  GARCIA: no        PHYSICAL EXAM:  GEN: NAD, looks comfortable  Psych: Mood appropriate  Neuro: A&Ox3.  No focal deficits.  Moving all extremities.   HEENT: No obvious abnormalities  CV: S1S2, regular, no murmurs appreciated.  No carotid bruits.  No JVD  Lungs: + 2 left sided chest tubes, on waterseal. decreased breath sounds left base  ABD: Soft, non-tender, non-distended.  +Bowel sounds  EXT: Warm and well perfused.  No peripheral edema noted  Musculoskeletal: Moving all extremities with normal ROM, no joint swelling  PV: Pedal pulses palpable      LABS:                        9.1    13.09 )-----------( 417      ( 2023 12:33 )             30.4       COUMADIN:  no    PT/INR - ( 2023 06:50 )   PT: 12.0 sec;   INR: 1.01          PTT - ( 2023 06:50 )  PTT:30.0 sec    02    136  |  101  |  10  ----------------------------<  292<H>  5.1   |  27  |  0.73    Ca    8.2<L>      2023 12:33  Mg     1.8         TPro  6.6  /  Alb  3.3  /  TBili  0.5  /  DBili  x   /  AST  10  /  ALT  9<L>  /  AlkPhos  96        Urinalysis Basic - ( 2023 20:45 )    Color: Yellow / Appearance: Clear / S.025 / pH: x  Gluc: x / Ketone: NEGATIVE  / Bili: Negative / Urobili: 0.2 E.U./dL   Blood: x / Protein: NEGATIVE mg/dL / Nitrite: NEGATIVE   Leuk Esterase: NEGATIVE / RBC: x / WBC x   Sq Epi: x / Non Sq Epi: x / Bacteria: x        MEDICATIONS  (STANDING):  aspirin enteric coated 81 milliGRAM(s) Oral daily  atorvastatin 40 milliGRAM(s) Oral at bedtime  bisacodyl 5 milliGRAM(s) Oral at bedtime  dextrose 50% Injectable 25 Gram(s) IV Push once  dextrose 50% Injectable 12.5 Gram(s) IV Push once  dextrose 50% Injectable 25 Gram(s) IV Push once  glucagon  Injectable 1 milliGRAM(s) IntraMuscular once  insulin glargine Injectable (LANTUS) 30 Unit(s) SubCutaneous at bedtime  insulin lispro (ADMELOG) corrective regimen sliding scale   SubCutaneous three times a day before meals  insulin lispro Injectable (ADMELOG) 12 Unit(s) SubCutaneous three times a day before meals  pantoprazole    Tablet 40 milliGRAM(s) Oral before breakfast  senna 2 Tablet(s) Oral at bedtime    MEDICATIONS  (PRN):  acetaminophen     Tablet .. 650 milliGRAM(s) Oral every 6 hours PRN Mild Pain (1 - 3)  dextrose Oral Gel 15 Gram(s) Oral once PRN Blood Glucose LESS THAN 70 milliGRAM(s)/deciliter  ondansetron Injectable 4 milliGRAM(s) IV Push once PRN Nausea and/or Vomiting  oxyCODONE    IR 10 milliGRAM(s) Oral every 6 hours PRN Severe Pain (7 - 10)  oxyCODONE    IR 5 milliGRAM(s) Oral every 6 hours PRN Moderate Pain (4 - 6)        RADIOLOGY & ADDITIONAL TESTS:    
Patient discussed on morning rounds with Dr. Bennett    Operation / Date: 2/17 L hemothorax evacuation    SUBJECTIVE ASSESSMENT:  72y Male seen and examined at bedside. He is tired, wants to sleep.        Vital Signs Last 24 Hrs  T(C): 36.1 (19 Feb 2023 18:18), Max: 36.8 (19 Feb 2023 01:13)  T(F): 97 (19 Feb 2023 18:18), Max: 98.2 (19 Feb 2023 01:13)  HR: 113 (19 Feb 2023 17:16) (97 - 113)  BP: 117/85 (19 Feb 2023 17:16) (101/54 - 134/63)  BP(mean): 64 (19 Feb 2023 17:16) (64 - 90)  RR: 12 (19 Feb 2023 17:16) (12 - 16)  SpO2: 94% (19 Feb 2023 17:16) (92% - 97%)    Parameters below as of 19 Feb 2023 17:16  Patient On (Oxygen Delivery Method): room air      I&O's Detail    18 Feb 2023 07:01  -  19 Feb 2023 07:00  --------------------------------------------------------  IN:  Total IN: 0 mL    OUT:    Chest Tube (mL): 90 mL    Chest Tube (mL): 58 mL    Voided (mL): 675 mL  Total OUT: 823 mL    Total NET: -823 mL      19 Feb 2023 07:01  -  19 Feb 2023 18:25  --------------------------------------------------------  IN:  Total IN: 0 mL    OUT:    Chest Tube (mL): 10 mL    Chest Tube (mL): 0 mL    Voided (mL): 425 mL  Total OUT: 435 mL    Total NET: -435 mL          CHEST TUBE:  no  INDIA DRAIN:  no  EPICARDIAL WIRES: no  TIE DOWNS: yes  GARCIA: no        PHYSICAL EXAM:  GEN: NAD, looks comfortable  Psych: Mood appropriate  Neuro: A&Ox3.  No focal deficits.  Moving all extremities.   HEENT: No obvious abnormalities  CV: S1S2, regular, no murmurs appreciated.  No carotid bruits.  No JVD  Lungs: crackled left base  ABD: Soft, non-tender, non-distended.  +Bowel sounds  EXT: Warm and well perfused.  No peripheral edema noted  Musculoskeletal: Moving all extremities with normal ROM, no joint swelling  PV: Pedal pulses palpable        LABS:                        9.4    10.74 )-----------( 423      ( 19 Feb 2023 05:30 )             30.3       COUMADIN:  no        02-18    136  |  101  |  10  ----------------------------<  292<H>  5.1   |  27  |  0.73    Ca    8.2<L>      18 Feb 2023 12:33  Mg     1.8     02-18            MEDICATIONS  (STANDING):  aspirin enteric coated 81 milliGRAM(s) Oral daily  atorvastatin 40 milliGRAM(s) Oral at bedtime  bisacodyl 5 milliGRAM(s) Oral at bedtime  dextrose 50% Injectable 25 Gram(s) IV Push once  dextrose 50% Injectable 12.5 Gram(s) IV Push once  dextrose 50% Injectable 25 Gram(s) IV Push once  glucagon  Injectable 1 milliGRAM(s) IntraMuscular once  insulin glargine Injectable (LANTUS) 30 Unit(s) SubCutaneous at bedtime  insulin lispro (ADMELOG) corrective regimen sliding scale   SubCutaneous at bedtime  insulin lispro (ADMELOG) corrective regimen sliding scale   SubCutaneous three times a day before meals  insulin lispro Injectable (ADMELOG) 12 Unit(s) SubCutaneous three times a day before meals  pantoprazole    Tablet 40 milliGRAM(s) Oral before breakfast  polyethylene glycol 3350 17 Gram(s) Oral daily  senna 2 Tablet(s) Oral at bedtime    MEDICATIONS  (PRN):  acetaminophen     Tablet .. 650 milliGRAM(s) Oral every 6 hours PRN Mild Pain (1 - 3)  dextrose Oral Gel 15 Gram(s) Oral once PRN Blood Glucose LESS THAN 70 milliGRAM(s)/deciliter  ondansetron Injectable 4 milliGRAM(s) IV Push once PRN Nausea and/or Vomiting  oxyCODONE    IR 10 milliGRAM(s) Oral every 6 hours PRN Severe Pain (7 - 10)  oxyCODONE    IR 5 milliGRAM(s) Oral every 6 hours PRN Moderate Pain (4 - 6)        RADIOLOGY & ADDITIONAL TESTS:    
Patient discussed on morning rounds with Dr. Krueger    OPERATION & DATE:  L hemothorax evacuation    SUBJECTIVE ASSESSMENT: Resting comfortably, still moderatly sedated, reports some pain from VATS sites    VITAL SIGNS:  Vital Signs Last 24 Hrs  T(C): 36.4 (2023 13:41), Max: 37 (2023 07:25)  T(F): 97.6 (2023 13:41), Max: 98.6 (2023 07:25)  HR: 92 (2023 15:11) (78 - 103)  BP: 116/62 (2023 15:11) (115/58 - 156/77)  BP(mean): 84 (2023 14:41) (78 - 89)  RR: 16 (2023 15:11) (14 - 26)  SpO2: 96% (2023 15:11) (93% - 100%)    Parameters below as of 2023 15:11  Patient On (Oxygen Delivery Method): nasal cannula  O2 Flow (L/min): 3    I&O's Detail    2023 07:01  -  2023 15:32  --------------------------------------------------------  IN:  Total IN: 0 mL    OUT:    Chest Tube (mL): 110 mL    Chest Tube (mL): 75 mL  Total OUT: 185 mL    Total NET: -185 mL        CHEST TUBE:  2 L chest tubes in place to suction  INDIA DRAIN:  none  EPICARDIAL WIRES: none  STITCHES: none  STAPLES: none  GARCIA: none  CENTRAL LINE: none  MIDLINE/PICC: none  WOUND VAC: none    PHYSICAL EXAM:  General:  HEENT:  Cardio:  Pulm:  GI:  Extremities:  Vascular:  Incisions:    LABS:                        9.0    17.00 )-----------( 368      ( 2023 13:13 )             28.9     PT/INR - ( 2023 06:50 )   PT: 12.0 sec;   INR: 1.01          PTT - ( 2023 06:50 )  PTT:30.0 sec      135  |  102  |  11  ----------------------------<  282<H>  4.8   |  28  |  0.71    Ca    8.1<L>      2023 13:13  Mg     1.7         TPro  6.6  /  Alb  3.3  /  TBili  0.5  /  DBili  x   /  AST  10  /  ALT  9<L>  /  AlkPhos  96      Urinalysis Basic - ( 2023 20:45 )    Color: Yellow / Appearance: Clear / S.025 / pH: x  Gluc: x / Ketone: NEGATIVE  / Bili: Negative / Urobili: 0.2 E.U./dL   Blood: x / Protein: NEGATIVE mg/dL / Nitrite: NEGATIVE   Leuk Esterase: NEGATIVE / RBC: x / WBC x   Sq Epi: x / Non Sq Epi: x / Bacteria: x      MEDICATIONS  (STANDING):  atorvastatin 40 milliGRAM(s) Oral at bedtime  bisacodyl 5 milliGRAM(s) Oral at bedtime  ceFAZolin   IVPB 2000 milliGRAM(s) IV Intermittent every 8 hours  lactated ringers. 1000 milliLiter(s) (50 mL/Hr) IV Continuous <Continuous>  lactated ringers. 1000 milliLiter(s) (100 mL/Hr) IV Continuous <Continuous>  pantoprazole    Tablet 40 milliGRAM(s) Oral before breakfast  senna 2 Tablet(s) Oral at bedtime    MEDICATIONS  (PRN):  HYDROmorphone  Injectable 0.5 milliGRAM(s) IV Push once PRN Severe Pain (7 -10)  ondansetron Injectable 4 milliGRAM(s) IV Push once PRN Nausea and/or Vomiting  oxyCODONE    IR 5 milliGRAM(s) Oral once PRN Moderate Pain (4 - 6)    RADIOLOGY & ADDITIONAL TESTS:      
INTERVAL HPI/OVERNIGHT EVENTS:    Patient is a 72y old  Male who presents with a chief complaint of Hemothorax (20 Feb 2023 08:56)  Pt for discharge today.  His main complaint is constipation.  He hasn't had BM for 6 days and is requesting medication to help with BM.  Sugars high yesterday: 270, 100, 350, 237.   314 this morning.  He has family at home who can help him with medication.  he says he has enough diabetes supplies at home (testing supplies)      MEDICATIONS  (STANDING):  aspirin enteric coated 81 milliGRAM(s) Oral daily  atorvastatin 40 milliGRAM(s) Oral at bedtime  bisacodyl 5 milliGRAM(s) Oral at bedtime  dextrose 50% Injectable 25 Gram(s) IV Push once  dextrose 50% Injectable 12.5 Gram(s) IV Push once  dextrose 50% Injectable 25 Gram(s) IV Push once  glucagon  Injectable 1 milliGRAM(s) IntraMuscular once  insulin glargine Injectable (LANTUS) 30 Unit(s) SubCutaneous at bedtime  insulin lispro (ADMELOG) corrective regimen sliding scale   SubCutaneous at bedtime  insulin lispro (ADMELOG) corrective regimen sliding scale   SubCutaneous three times a day before meals  insulin lispro Injectable (ADMELOG) 12 Unit(s) SubCutaneous three times a day before meals  pantoprazole    Tablet 40 milliGRAM(s) Oral before breakfast  polyethylene glycol 3350 17 Gram(s) Oral daily  senna 2 Tablet(s) Oral at bedtime    MEDICATIONS  (PRN):  acetaminophen     Tablet .. 650 milliGRAM(s) Oral every 6 hours PRN Mild Pain (1 - 3)  dextrose Oral Gel 15 Gram(s) Oral once PRN Blood Glucose LESS THAN 70 milliGRAM(s)/deciliter  ondansetron Injectable 4 milliGRAM(s) IV Push once PRN Nausea and/or Vomiting  oxyCODONE    IR 10 milliGRAM(s) Oral every 6 hours PRN Severe Pain (7 - 10)  oxyCODONE    IR 5 milliGRAM(s) Oral every 6 hours PRN Moderate Pain (4 - 6)      PHYSICAL EXAM  Vital Signs Last 24 Hrs  T(C): 36.3 (20 Feb 2023 08:54), Max: 36.9 (20 Feb 2023 05:02)  T(F): 97.4 (20 Feb 2023 08:54), Max: 98.4 (20 Feb 2023 05:02)  HR: 103 (20 Feb 2023 08:16) (94 - 113)  BP: 115/58 (20 Feb 2023 08:16) (115/58 - 147/72)  BP(mean): 81 (20 Feb 2023 08:16) (64 - 101)  RR: 17 (20 Feb 2023 08:16) (12 - 17)  SpO2: 100% (20 Feb 2023 08:16) (94% - 100%)    LABS:                        9.4    11.12 )-----------( 435      ( 20 Feb 2023 05:26 )             30.8     02-20    133<L>  |  97  |  11  ----------------------------<  289<H>  4.5   |  27  |  0.73    Ca    8.4      20 Feb 2023 05:26  Mg     1.9     02-20      Thyroid Stimulating Hormone, Serum: 1.240 uIU/mL (02-17 @ 06:50)  Thyroid Stimulating Hormone, Serum: 1.120 uIU/mL (02-16 @ 09:57)    CAPILLARY BLOOD GLUCOSE  POCT Blood Glucose.: 314 mg/dL (20 Feb 2023 06:58)  POCT Blood Glucose.: 237 mg/dL (19 Feb 2023 21:24)  POCT Blood Glucose.: 350 mg/dL (19 Feb 2023 16:06)    
INTERVAL HPI/OVERNIGHT EVENTS:    Patient is a 72y old  Male who presents with a chief complaint of Hemothorax (2023 15:20)  Pt sitting in chair, has some pain.   He ate small breakfast.  Sugars high -- 266 this morning. 264 pre lunch.  yesterday:  215, 241, 291, 333  At home, he takes 20 units bid of long acting, and Janumet   bid.    MEDICATIONS  (STANDING):  aspirin enteric coated 81 milliGRAM(s) Oral daily  atorvastatin 40 milliGRAM(s) Oral at bedtime  bisacodyl 5 milliGRAM(s) Oral at bedtime  dextrose 50% Injectable 25 Gram(s) IV Push once  dextrose 50% Injectable 12.5 Gram(s) IV Push once  dextrose 50% Injectable 25 Gram(s) IV Push once  glucagon  Injectable 1 milliGRAM(s) IntraMuscular once  insulin glargine Injectable (LANTUS) 30 Unit(s) SubCutaneous at bedtime  insulin lispro (ADMELOG) corrective regimen sliding scale   SubCutaneous three times a day before meals  insulin lispro Injectable (ADMELOG) 12 Unit(s) SubCutaneous three times a day before meals  pantoprazole    Tablet 40 milliGRAM(s) Oral before breakfast  senna 2 Tablet(s) Oral at bedtime    MEDICATIONS  (PRN):  acetaminophen     Tablet .. 650 milliGRAM(s) Oral every 6 hours PRN Mild Pain (1 - 3)  dextrose Oral Gel 15 Gram(s) Oral once PRN Blood Glucose LESS THAN 70 milliGRAM(s)/deciliter  ondansetron Injectable 4 milliGRAM(s) IV Push once PRN Nausea and/or Vomiting  oxyCODONE    IR 10 milliGRAM(s) Oral every 6 hours PRN Severe Pain (7 - 10)  oxyCODONE    IR 5 milliGRAM(s) Oral every 6 hours PRN Moderate Pain (4 - 6)      PHYSICAL EXAM  Vital Signs Last 24 Hrs-  T(C): 36 (2023 10:50), Max: 36.7 (2023 01:10)  T(F): 96.8 (2023 10:50), Max: 98 (2023 01:10)  HR: 95 (2023 12:20) (92 - 102)  BP: 116/58 (2023 12:20) (110/55 - 124/61)  BP(mean): 78 (2023 12:20) (77 - 91)  RR: 16 (2023 12:20) (12 - 26)  SpO2: 96% (2023 12:20) (92% - 100%)    Parameters below as of 2023 12:20  Patient On (Oxygen Delivery Method): room air    Constitutional: wn/wd in NAD.   HEENT: R eye ptosis?  Skin: no visible rashes/lesions  Psychiatric: AAO x 3, normal affect/mood.    LABS:                        9.1    13.09 )-----------( 417      ( 2023 12:33 )             30.4         135  |  102  |  11  ----------------------------<  282<H>  4.8   |  28  |  0.71    Ca    8.1<L>      2023 13:13  Mg     1.7         TPro  6.6  /  Alb  3.3  /  TBili  0.5  /  DBili  x   /  AST  10  /  ALT  9<L>  /  AlkPhos  96      PT/INR - ( 2023 06:50 )   PT: 12.0 sec;   INR: 1.01          PTT - ( 2023 06:50 )  PTT:30.0 sec  Urinalysis Basic - ( 2023 20:45 )    Color: Yellow / Appearance: Clear / S.025 / pH: x  Gluc: x / Ketone: NEGATIVE  / Bili: Negative / Urobili: 0.2 E.U./dL   Blood: x / Protein: NEGATIVE mg/dL / Nitrite: NEGATIVE   Leuk Esterase: NEGATIVE / RBC: x / WBC x   Sq Epi: x / Non Sq Epi: x / Bacteria: x      Thyroid Stimulating Hormone, Serum: 1.240 uIU/mL ( @ 06:50)  Thyroid Stimulating Hormone, Serum: 1.120 uIU/mL ( @ 09:57)      HbA1C:  9.2%  CAPILLARY BLOOD GLUCOSE  POCT Blood Glucose.: 264 mg/dL (2023 12:03)  POCT Blood Glucose.: 266 mg/dL (2023 06:23)  POCT Blood Glucose.: 307 mg/dL (2023 00:53)  POCT Blood Glucose.: 333 mg/dL (2023 21:08)  POCT Blood Glucose.: 254 mg/dL (2023 18:46)  POCT Blood Glucose.: 291 mg/dL (2023 16:12)  POCT Blood Glucose.: 241 mg/dL (2023 13:50)

## 2023-02-20 NOTE — DISCHARGE NOTE PROVIDER - PROVIDER TOKENS
PROVIDER:[TOKEN:[[id number]],FOLLOWUP:[1 week]],FREE:[LAST:[[facility]],PHONE:[phone]],FAX:[(   )    -],FOLLOWUP:[2 weeks]]

## 2023-02-20 NOTE — DISCHARGE NOTE NURSING/CASE MANAGEMENT/SOCIAL WORK - NSDCFUADDAPPT_GEN_ALL_CORE_FT
Our office will call you with your scheduled appointment with Dr. Bennett, but if you do not hear from us by wednesday, please call [phone]    Please call [facility] for an appointment in 2 weeks.

## 2023-02-20 NOTE — DISCHARGE NOTE PROVIDER - HOSPITAL COURSE
Patient discussed on morning rounds with Dr. Bennett    Operation Date: 2/17 L VATS w evacuation of hemothorax and washout    Primary Surgeon/Attending MD: Dr. Krueger    Referring Physician: JUHI  _ _ _ _ _ _ _ _ _ _ _ _  HOSPITAL COURSE:    72 M hx HTN, HLD, CAD s/p multiple PCI’s (STEVEN OM1 @ [facility] 6/16/21), DM2, presented to the ED on 2/16/23 with retained hemothorax. Pt states he was visiting family in [place] last week and slipped and fell getting out of the shower. States he was found to have a hemothorax and chest tube was placed. Pt was planned for VATS at hospital in [place], but pt wished to have care in [place]. Chest tube was removed, and pt flew back to [facility]. Pt underwent L VATS on 2/17 and 2 chest tubes left in place which were subsequently removed. POD 3 pt feeling well, feels ready for DC. Ambulating and tolerating PO without difficulty. Will be discharged home where he lives with his brother. Cleared for DC by Dr. Bennett.   _ _ _ _ _ _ _ _ _ _ _ _  DISCHARGE PHYSICAL EXAM:  General: Resting comfortably on chair in NAD  Neurological: AOx3. Motor skills grossly intact  Cardiovascular: Normal S1/S2. Regular rate/rhythm. No murmurs  Respiratory: Lungs CTA bilaterally. No wheezing or rales  Gastrointestinal: +BS in all 4 quadrants. Non-distended. Soft. Non-tender  Extremities: Strength 5/5 b/l upper/lower extremities. Sensation grossly intact upper/lower extremities. No edema. No calf tenderness.  Vascular: Radial 2+bilaterally, DP 2+ b/l  Incision Sites: VATS incisions healing well, covered by steristrips, no drainage.   _ _ _ _   _ _ _ _ _ _ _ _  REMOVAL CHECKLIST:        [ NA] Epicardial wires          [ x] Stitches/tie downs,   If no, why? ______          [ x] PICC/Midline,   If no, why? ________            _ _ _ _ _ _ _ _ _ _ _ _  Over 35 minutes was spent with the patient reviewing the discharge material including medications, follow up appointments, recovery, concerning symptoms, and how to contact their health care providers if they have questions

## 2023-02-20 NOTE — DISCHARGE NOTE NURSING/CASE MANAGEMENT/SOCIAL WORK - PATIENT PORTAL LINK FT
You can access the FollowMyHealth Patient Portal offered by [facility] by registering at the following website: [email or web address]. By joining [employer]’s FollowMyHealth portal, you will also be able to view your health information using other applications (apps) compatible with our system.

## 2023-02-20 NOTE — DISCHARGE NOTE PROVIDER - NSDCMRMEDTOKEN_GEN_ALL_CORE_FT
aspirin 81 mg oral delayed release tablet: 1 tab(s) orally once a day   Basaglar KwikPen 100 units/mL subcutaneous solution: 20 unit(s) subcutaneous 2 times a day in am and pm   clopidogrel 75 mg oral tablet: 1 tab(s) orally once a day  glimepiride 2 mg oral tablet: 1 tab(s) orally once a day  Janumet 50 mg-1000 mg oral tablet: 1 tab(s) orally 2 times a day (Restart on 6/19/21)  Jardiance 10 mg oral tablet: 1 tab(s) orally once a day (in the morning)  Lipitor 40 mg oral tablet: 1 tab(s) orally once a day (at bedtime)  oxybutynin 10 mg/24 hr oral tablet, extended release: 1 tab(s) orally once a day  ramipril 10 mg oral capsule: 1 cap(s) orally once a day  tamsulosin 0.4 mg oral capsule: 1 cap(s) orally once a day   acetaminophen 325 mg oral tablet: 2 tab(s) orally every 6 hours, As needed, Mild Pain (1 - 3)  aspirin 81 mg oral delayed release tablet: 1 tab(s) orally once a day   Basaglar KwikPen 100 units/mL subcutaneous solution: 40 unit(s) subcutaneous once a day (at bedtime)   bisacodyl 5 mg oral delayed release tablet: 1 tab(s) orally once a day (at bedtime)  clopidogrel 75 mg oral tablet: 1 tab(s) orally once a day  HumaLOG KwikPen 100 units/mL injectable solution: 15 unit(s) injectable 3 times a day (with meals)   Insulin Pen Needles, 4mm: 1 application subcutaneously 4 times a day. ** Use with insulin pen **   Jardiance 10 mg oral tablet: 1 tab(s) orally once a day (in the morning)  Lipitor 40 mg oral tablet: 1 tab(s) orally once a day (at bedtime)  metFORMIN 1000 mg oral tablet: 1 tab(s) orally 2 times a day   oxybutynin 10 mg/24 hr oral tablet, extended release: 1 tab(s) orally once a day  pantoprazole 40 mg oral delayed release tablet: 1 tab(s) orally once a day (before a meal)  polyethylene glycol 3350 oral powder for reconstitution: 17 gram(s) orally once a day  ramipril 10 mg oral capsule: 1 cap(s) orally once a day  tamsulosin 0.4 mg oral capsule: 1 cap(s) orally once a day   acetaminophen 325 mg oral tablet: 2 tab(s) orally every 6 hours, As needed, Mild Pain (1 - 3)  aspirin 81 mg oral delayed release tablet: 1 tab(s) orally once a day   Basaglar KwikPen 100 units/mL subcutaneous solution: 40 unit(s) subcutaneous once a day (at bedtime)   bisacodyl 5 mg oral delayed release tablet: 1 tab(s) orally once a day (at bedtime)  clopidogrel 75 mg oral tablet: 1 tab(s) orally once a day  Insulin Pen Needles, 4mm: 1 application subcutaneously 4 times a day. ** Use with insulin pen **   Jardiance 10 mg oral tablet: 1 tab(s) orally once a day (in the morning)  Lipitor 40 mg oral tablet: 1 tab(s) orally once a day (at bedtime)  metFORMIN 1000 mg oral tablet: 1 tab(s) orally 2 times a day   NovoLOG FlexPen 100 units/mL injectable solution: 15 unit(s) injectable 3 times a day (with meals)   oxybutynin 10 mg/24 hr oral tablet, extended release: 1 tab(s) orally once a day  pantoprazole 40 mg oral delayed release tablet: 1 tab(s) orally once a day (before a meal)  polyethylene glycol 3350 oral powder for reconstitution: 17 gram(s) orally once a day  ramipril 10 mg oral capsule: 1 cap(s) orally once a day  tamsulosin 0.4 mg oral capsule: 1 cap(s) orally once a day

## 2023-02-20 NOTE — DISCHARGE NOTE NURSING/CASE MANAGEMENT/SOCIAL WORK - NSDCPEFALRISK_GEN_ALL_CORE
For information on Fall & Injury Prevention, visit: [email or web address] OR  [email or web address] OR  https://www.cdc.gov/steadi/patient.html

## 2023-02-20 NOTE — DISCHARGE NOTE PROVIDER - NSDCCPCAREPLAN_GEN_ALL_CORE_FT
PRINCIPAL DISCHARGE DIAGNOSIS  Diagnosis: Hemothorax, left  Assessment and Plan of Treatment:

## 2023-02-20 NOTE — PROGRESS NOTE ADULT - ASSESSMENT
Diabetes, suboptimal.  Discharge diabetes regimen discussed with CT team:  glargine 40 units, lispro 15 units with meals.  Restart Jardiance and metformin.  Advised pt he can take glargine in one injection (at home, he was splitting 20 units BID) and I explained he now will have a second insulin (lispro) to take with meals.   He should not take this if he's not eating.  Both insulins will be clear, so he has to keep them separate and not confuse/mix them up.  with his sugars being the 300's, he needs rapid acting insulin;  oral agents are not going to be enough to manage/control post prandial sugars.    Pt can follow up at discharge with [facility] by calling  to make an appointment.

## 2023-02-27 ENCOUNTER — TRANSCRIPTION ENCOUNTER (OUTPATIENT)
Age: 73
End: 2023-02-27

## 2023-02-27 ENCOUNTER — INPATIENT (INPATIENT)
Facility: HOSPITAL | Age: 73
LOS: 0 days | Discharge: HOME | DRG: 188 | End: 2023-02-27
Attending: THORACIC SURGERY (CARDIOTHORACIC VASCULAR SURGERY) | Admitting: THORACIC SURGERY (CARDIOTHORACIC VASCULAR SURGERY)
Payer: MEDICARE

## 2023-02-27 VITALS — TEMPERATURE: 97 F

## 2023-02-27 VITALS
DIASTOLIC BLOOD PRESSURE: 71 MMHG | RESPIRATION RATE: 18 BRPM | SYSTOLIC BLOOD PRESSURE: 159 MMHG | HEART RATE: 84 BPM | OXYGEN SATURATION: 94 %

## 2023-02-27 DIAGNOSIS — Z98.890 OTHER SPECIFIED POSTPROCEDURAL STATES: Chronic | ICD-10-CM

## 2023-02-27 DIAGNOSIS — J94.8 OTHER SPECIFIED PLEURAL CONDITIONS: ICD-10-CM

## 2023-02-27 LAB
ANION GAP SERPL CALC-SCNC: 9 MMOL/L — SIGNIFICANT CHANGE UP (ref 5–17)
APTT BLD: 29.3 SEC — SIGNIFICANT CHANGE UP (ref 27.5–35.5)
BUN SERPL-MCNC: 10 MG/DL — SIGNIFICANT CHANGE UP (ref 7–23)
CALCIUM SERPL-MCNC: 8.8 MG/DL — SIGNIFICANT CHANGE UP (ref 8.4–10.5)
CHLORIDE SERPL-SCNC: 100 MMOL/L — SIGNIFICANT CHANGE UP (ref 96–108)
CO2 SERPL-SCNC: 28 MMOL/L — SIGNIFICANT CHANGE UP (ref 22–31)
CREAT SERPL-MCNC: 0.65 MG/DL — SIGNIFICANT CHANGE UP (ref 0.5–1.3)
EGFR: 101 ML/MIN/1.73M2 — SIGNIFICANT CHANGE UP
GLUCOSE BLDC GLUCOMTR-MCNC: 287 MG/DL — HIGH (ref 70–99)
GLUCOSE BLDC GLUCOMTR-MCNC: 309 MG/DL — HIGH (ref 70–99)
GLUCOSE SERPL-MCNC: 273 MG/DL — HIGH (ref 70–99)
HCT VFR BLD CALC: 28 % — LOW (ref 39–50)
HGB BLD-MCNC: 8.7 G/DL — LOW (ref 13–17)
INR BLD: 1.07 — SIGNIFICANT CHANGE UP (ref 0.88–1.16)
MCHC RBC-ENTMCNC: 24.1 PG — LOW (ref 27–34)
MCHC RBC-ENTMCNC: 31.1 GM/DL — LOW (ref 32–36)
MCV RBC AUTO: 77.6 FL — LOW (ref 80–100)
NRBC # BLD: 0 /100 WBCS — SIGNIFICANT CHANGE UP (ref 0–0)
PLATELET # BLD AUTO: 409 K/UL — HIGH (ref 150–400)
POTASSIUM SERPL-MCNC: 4.1 MMOL/L — SIGNIFICANT CHANGE UP (ref 3.5–5.3)
POTASSIUM SERPL-SCNC: 4.1 MMOL/L — SIGNIFICANT CHANGE UP (ref 3.5–5.3)
PROTHROM AB SERPL-ACNC: 12.8 SEC — SIGNIFICANT CHANGE UP (ref 10.5–13.4)
RBC # BLD: 3.61 M/UL — LOW (ref 4.2–5.8)
RBC # FLD: 13.9 % — SIGNIFICANT CHANGE UP (ref 10.3–14.5)
SARS-COV-2 RNA SPEC QL NAA+PROBE: SIGNIFICANT CHANGE UP
SODIUM SERPL-SCNC: 137 MMOL/L — SIGNIFICANT CHANGE UP (ref 135–145)
WBC # BLD: 6.8 K/UL — SIGNIFICANT CHANGE UP (ref 3.8–10.5)
WBC # FLD AUTO: 6.8 K/UL — SIGNIFICANT CHANGE UP (ref 3.8–10.5)

## 2023-02-27 PROCEDURE — 85610 PROTHROMBIN TIME: CPT

## 2023-02-27 PROCEDURE — 99223 1ST HOSP IP/OBS HIGH 75: CPT

## 2023-02-27 PROCEDURE — 71045 X-RAY EXAM CHEST 1 VIEW: CPT | Mod: 26

## 2023-02-27 PROCEDURE — 85730 THROMBOPLASTIN TIME PARTIAL: CPT

## 2023-02-27 PROCEDURE — 36415 COLL VENOUS BLD VENIPUNCTURE: CPT

## 2023-02-27 PROCEDURE — 80048 BASIC METABOLIC PNL TOTAL CA: CPT

## 2023-02-27 PROCEDURE — 85027 COMPLETE CBC AUTOMATED: CPT

## 2023-02-27 PROCEDURE — 82962 GLUCOSE BLOOD TEST: CPT

## 2023-02-27 PROCEDURE — 87635 SARS-COV-2 COVID-19 AMP PRB: CPT

## 2023-02-27 PROCEDURE — 71045 X-RAY EXAM CHEST 1 VIEW: CPT

## 2023-02-27 RX ORDER — HEPARIN SODIUM 5000 [USP'U]/ML
5000 INJECTION INTRAVENOUS; SUBCUTANEOUS EVERY 8 HOURS
Refills: 0 | Status: DISCONTINUED | OUTPATIENT
Start: 2023-02-27 | End: 2023-02-27

## 2023-02-27 RX ORDER — LISINOPRIL 2.5 MG/1
40 TABLET ORAL DAILY
Refills: 0 | Status: DISCONTINUED | OUTPATIENT
Start: 2023-02-27 | End: 2023-02-27

## 2023-02-27 RX ORDER — PANTOPRAZOLE SODIUM 20 MG/1
40 TABLET, DELAYED RELEASE ORAL
Refills: 0 | Status: DISCONTINUED | OUTPATIENT
Start: 2023-02-27 | End: 2023-02-27

## 2023-02-27 RX ORDER — ACETAMINOPHEN 500 MG
650 TABLET ORAL EVERY 6 HOURS
Refills: 0 | Status: DISCONTINUED | OUTPATIENT
Start: 2023-02-27 | End: 2023-02-27

## 2023-02-27 RX ORDER — TAMSULOSIN HYDROCHLORIDE 0.4 MG/1
0.4 CAPSULE ORAL AT BEDTIME
Refills: 0 | Status: DISCONTINUED | OUTPATIENT
Start: 2023-02-27 | End: 2023-02-27

## 2023-02-27 RX ORDER — DEXTROSE 50 % IN WATER 50 %
25 SYRINGE (ML) INTRAVENOUS ONCE
Refills: 0 | Status: DISCONTINUED | OUTPATIENT
Start: 2023-02-27 | End: 2023-02-27

## 2023-02-27 RX ORDER — INSULIN GLARGINE 100 [IU]/ML
40 INJECTION, SOLUTION SUBCUTANEOUS ONCE
Refills: 0 | Status: COMPLETED | OUTPATIENT
Start: 2023-02-27 | End: 2023-02-27

## 2023-02-27 RX ORDER — BNT162B2 ORIGINAL AND OMICRON BA.4/BA.5 .1125; .1125 MG/2.25ML; MG/2.25ML
0.3 INJECTION, SUSPENSION INTRAMUSCULAR ONCE
Refills: 0 | Status: DISCONTINUED | OUTPATIENT
Start: 2023-02-27 | End: 2023-02-27

## 2023-02-27 RX ORDER — OXYCODONE HYDROCHLORIDE 5 MG/1
20 TABLET ORAL EVERY 4 HOURS
Refills: 0 | Status: DISCONTINUED | OUTPATIENT
Start: 2023-02-27 | End: 2023-02-27

## 2023-02-27 RX ORDER — ASPIRIN/CALCIUM CARB/MAGNESIUM 324 MG
81 TABLET ORAL DAILY
Refills: 0 | Status: DISCONTINUED | OUTPATIENT
Start: 2023-02-27 | End: 2023-02-27

## 2023-02-27 RX ORDER — OXYCODONE HYDROCHLORIDE 5 MG/1
10 TABLET ORAL EVERY 4 HOURS
Refills: 0 | Status: DISCONTINUED | OUTPATIENT
Start: 2023-02-27 | End: 2023-02-27

## 2023-02-27 RX ORDER — OXYCODONE HYDROCHLORIDE 5 MG/1
1 TABLET ORAL
Qty: 30 | Refills: 0
Start: 2023-02-27 | End: 2023-03-03

## 2023-02-27 RX ORDER — GABAPENTIN 400 MG/1
100 CAPSULE ORAL THREE TIMES A DAY
Refills: 0 | Status: DISCONTINUED | OUTPATIENT
Start: 2023-02-27 | End: 2023-02-27

## 2023-02-27 RX ORDER — CLOPIDOGREL BISULFATE 75 MG/1
75 TABLET, FILM COATED ORAL DAILY
Refills: 0 | Status: DISCONTINUED | OUTPATIENT
Start: 2023-02-27 | End: 2023-02-27

## 2023-02-27 RX ORDER — SODIUM CHLORIDE 9 MG/ML
1000 INJECTION, SOLUTION INTRAVENOUS
Refills: 0 | Status: DISCONTINUED | OUTPATIENT
Start: 2023-02-27 | End: 2023-02-27

## 2023-02-27 RX ORDER — INSULIN LISPRO 100/ML
15 VIAL (ML) SUBCUTANEOUS
Refills: 0 | Status: DISCONTINUED | OUTPATIENT
Start: 2023-02-27 | End: 2023-02-27

## 2023-02-27 RX ORDER — INSULIN GLARGINE 100 [IU]/ML
40 INJECTION, SOLUTION SUBCUTANEOUS AT BEDTIME
Refills: 0 | Status: DISCONTINUED | OUTPATIENT
Start: 2023-02-27 | End: 2023-02-27

## 2023-02-27 RX ORDER — METFORMIN HYDROCHLORIDE 850 MG/1
1000 TABLET ORAL
Refills: 0 | Status: DISCONTINUED | OUTPATIENT
Start: 2023-02-27 | End: 2023-02-27

## 2023-02-27 RX ORDER — LIDOCAINE 4 G/100G
1 CREAM TOPICAL DAILY
Refills: 0 | Status: DISCONTINUED | OUTPATIENT
Start: 2023-02-27 | End: 2023-02-27

## 2023-02-27 RX ORDER — LANOLIN ALCOHOL/MO/W.PET/CERES
5 CREAM (GRAM) TOPICAL AT BEDTIME
Refills: 0 | Status: DISCONTINUED | OUTPATIENT
Start: 2023-02-27 | End: 2023-02-27

## 2023-02-27 RX ORDER — SENNA PLUS 8.6 MG/1
2 TABLET ORAL AT BEDTIME
Refills: 0 | Status: DISCONTINUED | OUTPATIENT
Start: 2023-02-27 | End: 2023-02-27

## 2023-02-27 RX ORDER — DEXTROSE 50 % IN WATER 50 %
12.5 SYRINGE (ML) INTRAVENOUS ONCE
Refills: 0 | Status: DISCONTINUED | OUTPATIENT
Start: 2023-02-27 | End: 2023-02-27

## 2023-02-27 RX ORDER — INSULIN LISPRO 100/ML
VIAL (ML) SUBCUTANEOUS
Refills: 0 | Status: DISCONTINUED | OUTPATIENT
Start: 2023-02-27 | End: 2023-02-27

## 2023-02-27 RX ORDER — METOPROLOL TARTRATE 50 MG
12.5 TABLET ORAL EVERY 12 HOURS
Refills: 0 | Status: DISCONTINUED | OUTPATIENT
Start: 2023-02-27 | End: 2023-02-27

## 2023-02-27 RX ORDER — GABAPENTIN 400 MG/1
1 CAPSULE ORAL
Qty: 21 | Refills: 0
Start: 2023-02-27 | End: 2023-03-05

## 2023-02-27 RX ORDER — OXYCODONE HYDROCHLORIDE 5 MG/1
5 TABLET ORAL EVERY 4 HOURS
Refills: 0 | Status: DISCONTINUED | OUTPATIENT
Start: 2023-02-27 | End: 2023-02-27

## 2023-02-27 RX ORDER — OXYBUTYNIN CHLORIDE 5 MG
5 TABLET ORAL DAILY
Refills: 0 | Status: DISCONTINUED | OUTPATIENT
Start: 2023-02-27 | End: 2023-02-27

## 2023-02-27 RX ORDER — DEXTROSE 50 % IN WATER 50 %
15 SYRINGE (ML) INTRAVENOUS ONCE
Refills: 0 | Status: DISCONTINUED | OUTPATIENT
Start: 2023-02-27 | End: 2023-02-27

## 2023-02-27 RX ORDER — ATORVASTATIN CALCIUM 80 MG/1
40 TABLET, FILM COATED ORAL AT BEDTIME
Refills: 0 | Status: DISCONTINUED | OUTPATIENT
Start: 2023-02-27 | End: 2023-02-27

## 2023-02-27 RX ORDER — LIDOCAINE 4 G/100G
1 CREAM TOPICAL
Qty: 0 | Refills: 0 | DISCHARGE
Start: 2023-02-27

## 2023-02-27 RX ORDER — GLUCAGON INJECTION, SOLUTION 0.5 MG/.1ML
1 INJECTION, SOLUTION SUBCUTANEOUS ONCE
Refills: 0 | Status: DISCONTINUED | OUTPATIENT
Start: 2023-02-27 | End: 2023-02-27

## 2023-02-27 RX ADMIN — PANTOPRAZOLE SODIUM 40 MILLIGRAM(S): 20 TABLET, DELAYED RELEASE ORAL at 06:27

## 2023-02-27 RX ADMIN — INSULIN GLARGINE 40 UNIT(S): 100 INJECTION, SOLUTION SUBCUTANEOUS at 02:08

## 2023-02-27 RX ADMIN — GABAPENTIN 100 MILLIGRAM(S): 400 CAPSULE ORAL at 09:29

## 2023-02-27 RX ADMIN — LISINOPRIL 40 MILLIGRAM(S): 2.5 TABLET ORAL at 09:34

## 2023-02-27 RX ADMIN — Medication 12.5 MILLIGRAM(S): at 06:27

## 2023-02-27 RX ADMIN — HEPARIN SODIUM 5000 UNIT(S): 5000 INJECTION INTRAVENOUS; SUBCUTANEOUS at 06:28

## 2023-02-27 RX ADMIN — Medication 8: at 07:15

## 2023-02-27 RX ADMIN — Medication 15 UNIT(S): at 07:15

## 2023-02-27 RX ADMIN — METFORMIN HYDROCHLORIDE 1000 MILLIGRAM(S): 850 TABLET ORAL at 06:28

## 2023-02-27 NOTE — H&P ADULT - ASSESSMENT
71 y/o male from [place] PMH multiple PCI,  morbid obesity, s/p Left VATS evacuation of hemothorax secondary to traumatic fall on February 17, 2023 with Dr. Bennett, multiple left  rib fractures. The last few days has experienced tightness, pain when deep breathing which has gotten progressively worse since the operation. PT went to [facility] ED in [place]. PT had a CT chest with contrast which did not show PE but did show a small left side pneumothorax with a loculated effusion.

## 2023-02-27 NOTE — H&P ADULT - NSHPPHYSICALEXAM_GEN_ALL_CORE
Physical Exam  CONSTITUTIONAL:                                                              morbid obesity  NEURO:                                                                       WNL                      EYES:                                                                                WNL  ENMT:                                                                               WNL  CV:                                                                                   WNL  RESPIRATORY:                                                                 decreased breath sounds left chest  GI:                                                                                     WNL  : GARCIA + / -                                                                  WNL  MUSCULOSKELETAL:                                                       WNL  SKIN / BREAST:                                                                  WNL

## 2023-02-27 NOTE — PATIENT PROFILE ADULT - STATED REASON FOR ADMISSION
pt complained of chest pain and showed pneumothorax in ED at [facility]. came in for possible chest tube placement.

## 2023-02-27 NOTE — H&P ADULT - HISTORY OF PRESENT ILLNESS
73 y/o male from [place] PMH multiple PCI,  morbid obesity, s/p Left VATS evacuation of hemothorax secondary to traumatic fall on February 17, 2023 with Dr. Bennett, multiple left  rib fractures. The last few days has experienced tightness, pain when deep breathing which has gotten progressively worse since the operation. PT went to [facility] ED in [place]. PT had a CT chest with contrast which did not show PE but did show a small left side pneumothorax with a loculated effusion. PT was subsequently transferred to [facility]acic surgery under Dr. Calero for evaluation and management.

## 2023-02-27 NOTE — DISCHARGE NOTE PROVIDER - NSDCFUADDINST_GEN_ALL_CORE_FT
-Walk daily as tolerated and use your incentive spirometer 10 times every hour while you are awake.     -Please weigh yourself daily. If you notice over a 3 pound weight gain in 3 days, this is a sign you are likely retaining too much fluid. It is imperative you call our right away with unexplained rapid weight gain.      -Please continue to wear the compression stockings given to you in the hospital at home. This is a way to prevent fluid from building up in your legs.     -No driving or strenuous activity/exercise until cleared by your surgeon.    -Gently clean your incisions with unscented/antibacterial soap and water, pat dry.  You may leave them open to air.    -Call your doctor if you have shortness of breath, chest pain not relieved by pain medication, dizziness, fever >101.5, or increased redness or drainage from incisions.

## 2023-02-27 NOTE — DISCHARGE NOTE PROVIDER - NSDCCPCAREPLAN_GEN_ALL_CORE_FT
PRINCIPAL DISCHARGE DIAGNOSIS  Diagnosis: Hydropneumothorax  Assessment and Plan of Treatment:

## 2023-02-27 NOTE — H&P ADULT - NSHPLABSRESULTS_GEN_ALL_CORE
CBC Full  -  ( 27 Feb 2023 01:15 )  WBC Count : 6.80 K/uL  RBC Count : 3.61 M/uL  Hemoglobin : 8.7 g/dL  Hematocrit : 28.0 %  Platelet Count - Automated : 409 K/uL  Mean Cell Volume : 77.6 fl  Mean Cell Hemoglobin : 24.1 pg  Mean Cell Hemoglobin Concentration : 31.1 gm/dL  Auto Neutrophil # : x  Auto Lymphocyte # : x  Auto Monocyte # : x  Auto Eosinophil # : x  Auto Basophil # : x  Auto Neutrophil % : x  Auto Lymphocyte % : x  Auto Monocyte % : x  Auto Eosinophil % : x  Auto Basophil % : x    02-27    137  |  100  |  10  ----------------------------<  273<H>  4.1   |  28  |  0.65    Ca    8.8      27 Feb 2023 01:15

## 2023-02-27 NOTE — DISCHARGE NOTE NURSING/CASE MANAGEMENT/SOCIAL WORK - PATIENT PORTAL LINK FT
You can access the FollowMyHealth Patient Portal offered by [facility] by registering at the following website: [email or web address]. By joining [facility]’s FollowMyHealth portal, you will also be able to view your health information using other applications (apps) compatible with our system.

## 2023-02-27 NOTE — PATIENT PROFILE ADULT - NSTRANSFERBELONGINGSRESP_GEN_A_NUR
TRANSFER - OUT REPORT:    Verbal report given to GAURAV Weiss RN (name) on Bob Perez.  being transferred to [facility], CVT Stepdown, Room 2301(unit) for routine progression of care       Report consisted of patients Situation, Background, Assessment and   Recommendations(SBAR). Information from the following report(s) SBAR, Kardex, MAR, Recent Results and Cardiac Rhythm - Sinus Rhythm. was reviewed with the receiving nurse. Lines:   Peripheral IV 05/13/17 Left Antecubital (Active)   Site Assessment Clean, dry, & intact 5/13/2017  7:42 PM   Phlebitis Assessment 0 5/13/2017  7:42 PM   Infiltration Assessment 0 5/13/2017  7:42 PM   Dressing Status Clean, dry, & intact 5/13/2017  7:42 PM   Dressing Type Tape;Transparent 5/13/2017  7:42 PM   Hub Color/Line Status Flushed;Patent 5/13/2017  7:42 PM   Action Taken Blood drawn 5/13/2017  7:42 PM        Opportunity for questions and clarification was provided.       Patient transported with:   Monitor   Heparin Drip
yes

## 2023-02-27 NOTE — DISCHARGE NOTE PROVIDER - CARE PROVIDER_API CALL
Luis Krueger)  Surgery  [address] [address]  Phone: [phone]  Fax: [phone]  Scheduled Appointment: 03/09/2023 11:30 AM

## 2023-02-27 NOTE — DISCHARGE NOTE PROVIDER - HOSPITAL COURSE
Patient discussed on morning rounds with Dr. Bennett    Operation Date: 2/17 L VATS w evacuation of hemothorax and washout    Primary Surgeon/Attending MD: Dr. Krueger    Referring Physician: none  _ _ _ _ _ _ _ _ _ _ _ _  HOSPITAL COURSE:  71 y/o male from [place] PMH multiple PCI,  morbid obesity, s/p Left VATS evacuation of hemothorax secondary to traumatic fall (with multiple left rib fractures) on February 17, 2023 with Dr. Krueger, Per patient, the last few days he experienced left chest tightness and pain when deep breathing which has gotten progressively worse since the operation. He went to [facility] ED in [place] where a CT chest with contrast was completed without evidence of PE but did show a small left side pneumothorax with a loculated effusion. He was transferred to [facility] for further evaluation. Patient states his pain has improved, denies any SOB. Tolerating PO diet, satting well on room air, ambulating independently. Per Dr. Bennett he is medically stable for discharge home today, 2/27/23.  _ _ _ _ _ _ _ _ _ _ _ _  DISCHARGE PHYSICAL EXAM:  General: NAD, sitting comfortably in bed, conversing appropriately  Neurological: alert and oriented, UE and LE strength equal b/l, facial symmetry present  Cardiovascular: RRR, Clear S1 and S2, no murmurs appreciated  Respiratory: chest expansion symmetrical, CTA b/l, no wheezing noted  Gastrointestinal: +BS, soft, NT, ND  Extremities: moving spontaneously, no calf tenderness or edema.  Vascular: warm, well perfused.   Incisions: L VATS c/d/i no drainage or purulence  _ _ _  _ _ _ _ _ _ _ _ _  REMOVAL CHECKLIST:          [none ] Stitches/tie downs,   If no, why? ______  _ _ _ _ _ _ _ _ _ _ _ _  Over 35 minutes was spent with the patient reviewing the discharge material including medications, follow up appointments, recovery, concerning symptoms, and how to contact their health care providers if they have questions.

## 2023-02-27 NOTE — DISCHARGE NOTE PROVIDER - NSDCMRMEDTOKEN_GEN_ALL_CORE_FT
acetaminophen 325 mg oral tablet: 2 tab(s) orally every 6 hours, As needed, Mild Pain (1 - 3)  aspirin 81 mg oral delayed release tablet: 1 tab(s) orally once a day   Basaglar KwikPen 100 units/mL subcutaneous solution: 40 unit(s) subcutaneous once a day (at bedtime)   bisacodyl 5 mg oral delayed release tablet: 1 tab(s) orally once a day (at bedtime)  clopidogrel 75 mg oral tablet: 1 tab(s) orally once a day  gabapentin 100 mg oral capsule: 1 cap(s) orally 3 times a day  Jardiance 10 mg oral tablet: 1 tab(s) orally once a day (in the morning)  lidocaine 4% topical film: Apply topically to affected area once a day  Lipitor 40 mg oral tablet: 1 tab(s) orally once a day (at bedtime)  metFORMIN 1000 mg oral tablet: 1 tab(s) orally 2 times a day   NovoLOG FlexPen 100 units/mL injectable solution: 15 unit(s) injectable 3 times a day (with meals)   oxybutynin 10 mg/24 hr oral tablet, extended release: 1 tab(s) orally once a day  oxyCODONE 5 mg oral tablet: 1 tab(s) orally every 4 hours, As needed, Moderate Pain (4 - 6) MDD:6  pantoprazole 40 mg oral delayed release tablet: 1 tab(s) orally once a day (before a meal)  polyethylene glycol 3350 oral powder for reconstitution: 17 gram(s) orally once a day  ramipril 10 mg oral capsule: 1 cap(s) orally once a day  tamsulosin 0.4 mg oral capsule: 1 cap(s) orally once a day

## 2023-02-28 DIAGNOSIS — E78.5 HYPERLIPIDEMIA, UNSPECIFIED: ICD-10-CM

## 2023-02-28 DIAGNOSIS — E11.65 TYPE 2 DIABETES MELLITUS WITH HYPERGLYCEMIA: ICD-10-CM

## 2023-02-28 DIAGNOSIS — S27.1XXA TRAUMATIC HEMOTHORAX, INITIAL ENCOUNTER: ICD-10-CM

## 2023-02-28 DIAGNOSIS — I25.10 ATHEROSCLEROTIC HEART DISEASE OF NATIVE CORONARY ARTERY WITHOUT ANGINA PECTORIS: ICD-10-CM

## 2023-02-28 DIAGNOSIS — Z79.4 LONG TERM (CURRENT) USE OF INSULIN: ICD-10-CM
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
## 2023-02-28 DIAGNOSIS — E11.40 TYPE 2 DIABETES MELLITUS WITH DIABETIC NEUROPATHY, UNSPECIFIED: ICD-10-CM

## 2023-02-28 DIAGNOSIS — D64.89 OTHER SPECIFIED ANEMIAS: ICD-10-CM
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
## 2023-02-28 DIAGNOSIS — N40.0 BENIGN PROSTATIC HYPERPLASIA WITHOUT LOWER URINARY TRACT SYMPTOMS: ICD-10-CM
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
## 2023-02-28 DIAGNOSIS — I10 ESSENTIAL (PRIMARY) HYPERTENSION: ICD-10-CM

## 2023-02-28 DIAGNOSIS — J98.11 ATELECTASIS: ICD-10-CM

## 2023-03-03 DIAGNOSIS — Z79.02 LONG TERM (CURRENT) USE OF ANTITHROMBOTICS/ANTIPLATELETS: ICD-10-CM

## 2023-03-03 DIAGNOSIS — I10 ESSENTIAL (PRIMARY) HYPERTENSION: ICD-10-CM

## 2023-03-03 DIAGNOSIS — Z79.84 LONG TERM (CURRENT) USE OF ORAL HYPOGLYCEMIC DRUGS: ICD-10-CM

## 2023-03-03 DIAGNOSIS — E66.01 MORBID (SEVERE) OBESITY DUE TO EXCESS CALORIES: ICD-10-CM

## 2023-03-03 DIAGNOSIS — E11.9 TYPE 2 DIABETES MELLITUS WITHOUT COMPLICATIONS: ICD-10-CM

## 2023-03-03 DIAGNOSIS — Z95.5 PRESENCE OF CORONARY ANGIOPLASTY IMPLANT AND GRAFT: ICD-10-CM

## 2023-03-03 DIAGNOSIS — I25.10 ATHEROSCLEROTIC HEART DISEASE OF NATIVE CORONARY ARTERY WITHOUT ANGINA PECTORIS: ICD-10-CM

## 2023-03-03 DIAGNOSIS — J94.8 OTHER SPECIFIED PLEURAL CONDITIONS: ICD-10-CM

## 2023-03-03 DIAGNOSIS — Z79.82 LONG TERM (CURRENT) USE OF ASPIRIN: ICD-10-CM

## 2023-03-03 DIAGNOSIS — Z20.822 CONTACT WITH AND (SUSPECTED) EXPOSURE TO COVID-19: ICD-10-CM

## 2023-03-03 DIAGNOSIS — N40.0 BENIGN PROSTATIC HYPERPLASIA WITHOUT LOWER URINARY TRACT SYMPTOMS: ICD-10-CM

## 2023-03-03 DIAGNOSIS — R07.9 CHEST PAIN, UNSPECIFIED: ICD-10-CM

## 2023-03-03 DIAGNOSIS — Z79.4 LONG TERM (CURRENT) USE OF INSULIN: ICD-10-CM

## 2023-03-07 PROBLEM — I10 BENIGN ESSENTIAL HYPERTENSION: Status: ACTIVE | Noted: 2023-03-07

## 2023-03-07 PROBLEM — E78.5 HYPERLIPIDEMIA: Status: ACTIVE | Noted: 2023-03-07

## 2023-03-07 PROBLEM — I25.10 CORONARY ARTERY DISEASE: Status: ACTIVE | Noted: 2023-03-07

## 2023-03-07 PROBLEM — Z09 POSTOP CHECK: Status: ACTIVE | Noted: 2023-03-07

## 2023-03-07 PROBLEM — E11.9 TYPE II DIABETES MELLITUS: Status: ACTIVE | Noted: 2023-03-07

## 2023-03-07 RX ORDER — ATORVASTATIN CALCIUM 40 MG/1
40 TABLET, FILM COATED ORAL
Refills: 0 | Status: ACTIVE | COMMUNITY

## 2023-03-07 RX ORDER — CLOPIDOGREL 75 MG/1
75 TABLET, FILM COATED ORAL
Refills: 0 | Status: ACTIVE | COMMUNITY

## 2023-03-07 RX ORDER — ASPIRIN ENTERIC COATED TABLETS 81 MG 81 MG/1
81 TABLET, DELAYED RELEASE ORAL
Refills: 0 | Status: ACTIVE | COMMUNITY

## 2023-03-07 RX ORDER — INSULIN ASPART 100 [IU]/ML
100 INJECTION, SOLUTION INTRAVENOUS; SUBCUTANEOUS 3 TIMES DAILY
Refills: 0 | Status: ACTIVE | COMMUNITY

## 2023-03-07 RX ORDER — GABAPENTIN 100 MG/1
100 CAPSULE ORAL
Refills: 0 | Status: ACTIVE | COMMUNITY

## 2023-03-07 RX ORDER — OXYCODONE 5 MG/1
5 TABLET ORAL
Refills: 0 | Status: ACTIVE | COMMUNITY

## 2023-03-07 RX ORDER — EMPAGLIFLOZIN 10 MG/1
10 TABLET, FILM COATED ORAL
Refills: 0 | Status: ACTIVE | COMMUNITY

## 2023-03-07 RX ORDER — PANTOPRAZOLE 40 MG/1
40 TABLET, DELAYED RELEASE ORAL
Refills: 0 | Status: ACTIVE | COMMUNITY

## 2023-03-07 RX ORDER — METFORMIN HYDROCHLORIDE 1000 MG/1
1000 TABLET, COATED ORAL
Refills: 0 | Status: ACTIVE | COMMUNITY

## 2023-03-07 RX ORDER — RAMIPRIL 10 MG/1
10 CAPSULE ORAL
Refills: 0 | Status: ACTIVE | COMMUNITY

## 2023-03-07 RX ORDER — OXYBUTYNIN CHLORIDE 10 MG/1
10 TABLET, EXTENDED RELEASE ORAL DAILY
Refills: 0 | Status: ACTIVE | COMMUNITY

## 2023-03-07 RX ORDER — BISACODYL 5 MG/1
5 TABLET, DELAYED RELEASE ORAL
Refills: 0 | Status: ACTIVE | COMMUNITY

## 2023-03-07 NOTE — REASON FOR VISIT
[de-identified] : Left VATS, Evacuation of retained hemothorax and decortication, Bronchoscopy with lavage [de-identified] : 02/17/2023 [de-identified] : 2 [de-identified] : Postop hospitalization uneventful, and he was discharged on 02/20/2023.  Patient was readmitted to [facility] on 02/27/23 due to L chest tightness and pain, progressively worse s/p operation. CT chest w/contrast showed a small L side PTX with a loculated effusion. No intervention needed. \par \par Patient presents today for his first postoperative visit with a CXR

## 2023-03-07 NOTE — ASSESSMENT
[FreeTextEntry1] : 71 y/o M with hx of HTN, HLD, CAD s/p multiple PCIs, T2DM, morbid obesity, who presented to [facility] ED on 2/16/2023 with a retained hemothorax s/p rib fracture.  \par \par On 02/17/2023, he underwent Left VATS evacuation of hemothorax secondary to traumatic fall (with multiple left rib fractures).  \par \par Per patient, the last few days he experienced left chest tightness and pain when deep breathing which has \par gotten progressively worse since the operation. He went to [facility] ED in [place] where a CT chest with contrast was completed without evidence of PE but did show a small left side pneumothorax with a loculated effusion. He was transferred to [facility] for further evaluation. \par

## 2023-03-09 ENCOUNTER — APPOINTMENT (OUTPATIENT)
Dept: THORACIC SURGERY | Facility: CLINIC | Age: 73
End: 2023-03-09

## 2023-03-09 DIAGNOSIS — E11.9 TYPE 2 DIABETES MELLITUS W/OUT COMPLICATIONS: ICD-10-CM

## 2023-03-09 DIAGNOSIS — E78.5 HYPERLIPIDEMIA, UNSPECIFIED: ICD-10-CM

## 2023-03-09 DIAGNOSIS — I10 ESSENTIAL (PRIMARY) HYPERTENSION: ICD-10-CM

## 2023-03-09 DIAGNOSIS — I25.10 ATHEROSCLEROTIC HEART DISEASE OF NATIVE CORONARY ARTERY W/OUT ANGINA PECTORIS: ICD-10-CM

## 2023-03-09 DIAGNOSIS — Z09 ENCOUNTER FOR FOLLOW-UP EXAMINATION AFTER COMPLETED TREATMENT FOR CONDITIONS OTHER THAN MALIGNANT NEOPLASM: ICD-10-CM

## 2023-05-06 NOTE — PATIENT PROFILE ADULT - ARRIVAL FROM
Medical screening examination initiated.  I have conducted a focused provider triage encounter, findings are as follows:    Brief history of present illness:  Patient reports right ear pain x4 days, sent by urgent Care for further evaluation    There were no vitals filed for this visit.    Pertinent physical exam:  Moderate swelling and erythema to the right ear    Brief workup plan:  Labs and further evaluation/possible admission    Preliminary workup initiated; this workup will be continued and followed by the physician or advanced practice provider that is assigned to the patient when roomed.  
Home

## 2023-06-05 VITALS
RESPIRATION RATE: 16 BRPM | OXYGEN SATURATION: 97 % | WEIGHT: 220.02 LBS | DIASTOLIC BLOOD PRESSURE: 63 MMHG | HEART RATE: 80 BPM | TEMPERATURE: 97 F | HEIGHT: 70 IN | SYSTOLIC BLOOD PRESSURE: 143 MMHG

## 2023-06-05 RX ORDER — CHLORHEXIDINE GLUCONATE 213 G/1000ML
1 SOLUTION TOPICAL ONCE
Refills: 0 | Status: DISCONTINUED | OUTPATIENT
Start: 2023-06-07 | End: 2023-06-21

## 2023-06-05 NOTE — H&P ADULT - NSHPLABSRESULTS_GEN_ALL_CORE
10.6   6.82  )-----------( 276      ( 2023 10:15 )             35.6       06-07    137  |  103  |  10  ----------------------------<  249<H>  4.6   |  27  |  0.70    Ca    9.0      2023 10:15  Mg     1.6     06-07    TPro  7.5  /  Alb  3.8  /  TBili  0.3  /  DBili  x   /  AST  8<L>  /  ALT  8<L>  /  AlkPhos  125<H>  06-07      PT/INR - ( 2023 10:15 )   PT: 12.5 sec;   INR: 1.05          PTT - ( 2023 10:15 )  PTT:30.7 sec    CARDIAC MARKERS ( 2023 10:15 )  x     / x     / 55 U/L / x     / 1.6 ng/mL            [id number] NSR 77 bpm intraventricular block

## 2023-06-05 NOTE — H&P ADULT - HISTORY OF PRESENT ILLNESS
Cardiologist: Dr. Solis    Pharmacy: [facility]   Escort: son      72 yo Polish/English speaking M w/ PMHx HTN, HLD, CAD s/p multiple PCI’s (STEVEN dLAD, pLCx, OM1,  @ [facility]), DM2 w/ neuropathy, BPH, s/p L VATS evacuation of hemothorax 2/2 traumatic fall 2/23 presented to outpatient cardiologist, Dr. Solis,  with reports of exertional SOB and chest pain/discomfort upon mild exertion. Denies Pt denies fever, chills, cough, palpitations,  PND/orthopnea, LE edema, abdominal pain, N/V/D, dizziness, syncope. Per MD records most recent TTE unremarkable w/ normal LVEF.    Cath History   Cardiac Cath 6/15/22:  STEVEN mRCA; LCx patent stent, LAD mild dz, LM minimal irregularities,  Cardiac Cath 6/16/21 w/ STEVEN OM1 and revealing LM normal, mLAD 30%, dLCx stent patent, mRCA 40% (ectatic), RPLS subtotal  Cardiac Cath 6/12/18 : STEVEN dLAD 85%, STEVEN pLCx 80%  with residual mLAD 40%, D1 40%, mRCA 40%, dRCA 50% (small vessel distally).     Cardiac Studies   NST 5/13/22: medium-large size reversible perfusion defect of moderate intensity involving the entire (Basal-apical) inferior/inferolateral myocardial walls suggestive of inducible myocardial ischemia in the posterior coronary circulation, EF 55%     In light of patient’s risk factors, known h/o CAD, CCS 3 anginal equivalent symptoms, abnormal stress test, pt referred for cardiac catheterization with possible intervention if clinically indicated.     Cardiologist: Dr. Solis    Pharmacy: [facility]   Escort: son      72 yo French/English speaking M w/ PMHx HTN, HLD, CAD s/p multiple PCI’s (STEVEN dLAD, pLCx, OM1,  @ [facility]), DM2 w/ neuropathy, BPH, s/p L VATS evacuation of hemothorax 2/2 traumatic fall 2/23 presented to outpatient cardiologist, Dr. Solis,  with reports of exertional SOB and chest pain/discomfort upon mild exertion. Denies Pt denies fever, chills, cough, palpitations,  PND/orthopnea, LE edema, abdominal pain, N/V/D, dizziness, syncope. Per MD records most recent TTE unremarkable w/ normal LVEF.    Cath History   Cardiac Cath 6/15/22:  STEVEN mRCA; LCx patent stent, LAD mild dz, LM minimal irregularities,  Cardiac Cath 6/16/21 w/ STEVEN OM1 and revealing LM normal, mLAD 30%, dLCx stent patent, mRCA 40% (ectatic), RPLS subtotal  Cardiac Cath 6/12/18 : STEVEN dLAD 85%, STEVEN pLCx 80%  with residual mLAD 40%, D1 40%, mRCA 40%, dRCA 50% (small vessel distally).     Cardiac Studies   NST 5/13/22: medium-large size reversible perfusion defect of moderate intensity involving the entire (Basal-apical) inferior/inferolateral myocardial walls suggestive of inducible myocardial ischemia in the posterior coronary circulation, EF 55%     In light of patient’s risk factors, known h/o CAD, CCS 3 anginal equivalent symptoms, abnormal stress test, pt referred for cardiac catheterization with possible intervention if clinically indicated.     Cardiologist: Dr. Solis    Pharmacy: [facility] (meds confirmed with pharmacy and patient)  Escort: wife     70 yo Indonesian/English speaking M w/ PMHx HTN, HLD, CAD s/p multiple PCI’s (STEVEN dLAD, pLCx, OM1,  @ [facility]), DM2 w/ neuropathy, BPH, s/p L VATS evacuation of hemothorax 2/2 traumatic fall 2/23 presented to outpatient cardiologist, Dr. Solis,  with reports of exertional SOB and chest pain/discomfort upon mild exertion. Denies Pt denies fever, chills, cough, palpitations,  PND/orthopnea, LE edema, abdominal pain, N/V/D, dizziness, syncope. Per MD records most recent TTE unremarkable w/ normal LVEF.    Cath History   Cardiac Cath 6/15/22:  STEVEN mRCA; LCx patent stent, LAD mild dz, LM minimal irregularities,  Cardiac Cath 6/16/21 w/ STEVEN OM1 and revealing LM normal, mLAD 30%, dLCx stent patent, mRCA 40% (ectatic), RPLS subtotal  Cardiac Cath 6/12/18 : STEVEN dLAD 85%, STEVEN pLCx 80%  with residual mLAD 40%, D1 40%, mRCA 40%, dRCA 50% (small vessel distally).     Cardiac Studies   NST 5/13/22: medium-large size reversible perfusion defect of moderate intensity involving the entire (Basal-apical) inferior/inferolateral myocardial walls suggestive of inducible myocardial ischemia in the posterior coronary circulation, EF 55%     In light of patient’s risk factors, known h/o CAD, CCS 3 anginal equivalent symptoms, abnormal stress test, pt referred for cardiac catheterization with possible intervention if clinically indicated.

## 2023-06-05 NOTE — H&P ADULT - ASSESSMENT
70 yo Malay/English speaking M w/ PMHx HTN, HLD, CAD s/p multiple PCI’s (STEVEN dLAD, pLCx, OM1,  @ [facility]), DM2 w/ neuropathy, BPH, s/p L VATS evacuation of hemothorax 2/2 traumatic fall 2/23 presented to outpatient cardiologist, Dr. Solis,  with reports of exertional SOB and chest pain/discomfort upon mild exertion. In light of patient’s risk factors, known h/o CAD, CCS 3 anginal equivalent symptoms, abnormal stress test, pt referred for cardiac catheterization with possible intervention if clinically indicated.          NPO for procedure today  Normal LVEF by TTE. sCr *** on arrival. Euvolemic on exam. Will hydrate with IVF NS 250cc/hour followed by NS 75cc/hour as per protocol  H/H *** on arrival. Denies BRBPR, melena, hematuria, hematemesis. On ASA 81 mg PO daily (last dose ***) and Plavix 75 mg PO daily (last dose ***). Will load with ASA *** mg PO x1 and Plavix 600 mg PO x1  Consents signed    ASA Class III  Mallampati Class III    Risks & benefits of procedure and alternative therapy have been explained to the patient including but not limited to: allergic reaction, bleeding with possible need for blood transfusion, infection, renal and vascular compromise, limb damage, arrhythmia, stroke, vessel dissection/perforation, Myocardial infarction, emergent CABG. Informed consent obtained and in chart.       Patient a candidate for sedation: Yes  Sedation Type: Moderate   70 yo German/English speaking M w/ PMHx HTN, HLD, CAD s/p multiple PCI’s (STEVEN dLAD, pLCx, OM1,  @ [facility]), DM2 w/ neuropathy, BPH, s/p L VATS evacuation of hemothorax 2/2 traumatic fall 2/23 presented to outpatient cardiologist, Dr. Solis,  with reports of exertional SOB and chest pain/discomfort upon mild exertion. In light of patient’s risk factors, known h/o CAD, CCS 3 anginal equivalent symptoms, abnormal stress test, pt referred for cardiac catheterization with possible intervention if clinically indicated.          NPO for procedure today  Normal LVEF by TTE. sCr 0.70 on arrival. Euvolemic on exam. Will hydrate with IVF NS 250cc/hour followed by NS 75cc/hour as per protocol  H/H 10.6/35.6 on arrival. Denies BRBPR, melena, hematuria, hematemesis. On ASA 81 mg PO daily (last dose 06/05/2023) and Plavix 75 mg PO daily (last dose 06/05/2023). Will load with  mg PO x1 and Plavix 600 mg PO x1  Magnesium 1.6 on arrival, replete with Mag Sulfate 2g IV x1  Consents signed    ASA Class III  Mallampati Class III    Risks & benefits of procedure and alternative therapy have been explained to the patient including but not limited to: allergic reaction, bleeding with possible need for blood transfusion, infection, renal and vascular compromise, limb damage, arrhythmia, stroke, vessel dissection/perforation, Myocardial infarction, emergent CABG. Informed consent obtained and in chart.       Patient a candidate for sedation: Yes  Sedation Type: Moderate

## 2023-06-07 ENCOUNTER — OUTPATIENT (OUTPATIENT)
Dept: OUTPATIENT SERVICES | Facility: HOSPITAL | Age: 73
LOS: 1 days | Discharge: HOME | End: 2023-06-07
Payer: MEDICARE

## 2023-06-07 DIAGNOSIS — Z98.890 OTHER SPECIFIED POSTPROCEDURAL STATES: Chronic | ICD-10-CM

## 2023-06-07 LAB
A1C WITH ESTIMATED AVERAGE GLUCOSE RESULT: 11.7 % — HIGH (ref 4–5.6)
ALBUMIN SERPL ELPH-MCNC: 3.8 G/DL — SIGNIFICANT CHANGE UP (ref 3.3–5)
ALP SERPL-CCNC: 125 U/L — HIGH (ref 40–120)
ALT FLD-CCNC: 8 U/L — LOW (ref 10–45)
ANION GAP SERPL CALC-SCNC: 7 MMOL/L — SIGNIFICANT CHANGE UP (ref 5–17)
ANISOCYTOSIS BLD QL: SLIGHT — SIGNIFICANT CHANGE UP
APTT BLD: 30.7 SEC — SIGNIFICANT CHANGE UP (ref 27.5–35.5)
AST SERPL-CCNC: 8 U/L — LOW (ref 10–40)
BASOPHILS # BLD AUTO: 0 K/UL — SIGNIFICANT CHANGE UP (ref 0–0.2)
BASOPHILS NFR BLD AUTO: 0 % — SIGNIFICANT CHANGE UP (ref 0–2)
BILIRUB SERPL-MCNC: 0.3 MG/DL — SIGNIFICANT CHANGE UP (ref 0.2–1.2)
BUN SERPL-MCNC: 10 MG/DL — SIGNIFICANT CHANGE UP (ref 7–23)
BURR CELLS BLD QL SMEAR: PRESENT — SIGNIFICANT CHANGE UP
CALCIUM SERPL-MCNC: 9 MG/DL — SIGNIFICANT CHANGE UP (ref 8.4–10.5)
CHLORIDE SERPL-SCNC: 103 MMOL/L — SIGNIFICANT CHANGE UP (ref 96–108)
CK MB CFR SERPL CALC: 1.6 NG/ML — SIGNIFICANT CHANGE UP (ref 0–6.7)
CK SERPL-CCNC: 55 U/L — SIGNIFICANT CHANGE UP (ref 30–200)
CO2 SERPL-SCNC: 27 MMOL/L — SIGNIFICANT CHANGE UP (ref 22–31)
CREAT SERPL-MCNC: 0.7 MG/DL — SIGNIFICANT CHANGE UP (ref 0.5–1.3)
DACRYOCYTES BLD QL SMEAR: SLIGHT — SIGNIFICANT CHANGE UP
EGFR: 99 ML/MIN/1.73M2 — SIGNIFICANT CHANGE UP
EOSINOPHIL # BLD AUTO: 0.06 K/UL — SIGNIFICANT CHANGE UP (ref 0–0.5)
EOSINOPHIL NFR BLD AUTO: 0.9 % — SIGNIFICANT CHANGE UP (ref 0–6)
ESTIMATED AVERAGE GLUCOSE: 289 MG/DL — HIGH (ref 68–114)
GIANT PLATELETS BLD QL SMEAR: PRESENT — SIGNIFICANT CHANGE UP
GLUCOSE BLDC GLUCOMTR-MCNC: 193 MG/DL — HIGH (ref 70–99)
GLUCOSE SERPL-MCNC: 249 MG/DL — HIGH (ref 70–99)
HCT VFR BLD CALC: 35.6 % — LOW (ref 39–50)
HGB BLD-MCNC: 10.6 G/DL — LOW (ref 13–17)
HYPOCHROMIA BLD QL: SLIGHT — SIGNIFICANT CHANGE UP
INR BLD: 1.05 — SIGNIFICANT CHANGE UP (ref 0.88–1.16)
LYMPHOCYTES # BLD AUTO: 2.35 K/UL — SIGNIFICANT CHANGE UP (ref 1–3.3)
LYMPHOCYTES # BLD AUTO: 34.5 % — SIGNIFICANT CHANGE UP (ref 13–44)
MAGNESIUM SERPL-MCNC: 1.6 MG/DL — SIGNIFICANT CHANGE UP (ref 1.6–2.6)
MANUAL SMEAR VERIFICATION: SIGNIFICANT CHANGE UP
MCHC RBC-ENTMCNC: 20.9 PG — LOW (ref 27–34)
MCHC RBC-ENTMCNC: 29.8 GM/DL — LOW (ref 32–36)
MCV RBC AUTO: 70.4 FL — LOW (ref 80–100)
MICROCYTES BLD QL: SLIGHT — SIGNIFICANT CHANGE UP
MONOCYTES # BLD AUTO: 0.38 K/UL — SIGNIFICANT CHANGE UP (ref 0–0.9)
MONOCYTES NFR BLD AUTO: 5.5 % — SIGNIFICANT CHANGE UP (ref 2–14)
NEUTROPHILS # BLD AUTO: 4.03 K/UL — SIGNIFICANT CHANGE UP (ref 1.8–7.4)
NEUTROPHILS NFR BLD AUTO: 59.1 % — SIGNIFICANT CHANGE UP (ref 43–77)
NRBC # BLD: 1 /100 — HIGH (ref 0–0)
NRBC # BLD: SIGNIFICANT CHANGE UP /100 WBCS (ref 0–0)
OVALOCYTES BLD QL SMEAR: SLIGHT — SIGNIFICANT CHANGE UP
PLAT MORPH BLD: ABNORMAL
PLATELET # BLD AUTO: 276 K/UL — SIGNIFICANT CHANGE UP (ref 150–400)
POIKILOCYTOSIS BLD QL AUTO: SLIGHT — SIGNIFICANT CHANGE UP
POLYCHROMASIA BLD QL SMEAR: SLIGHT — SIGNIFICANT CHANGE UP
POTASSIUM SERPL-MCNC: 4.6 MMOL/L — SIGNIFICANT CHANGE UP (ref 3.5–5.3)
POTASSIUM SERPL-SCNC: 4.6 MMOL/L — SIGNIFICANT CHANGE UP (ref 3.5–5.3)
PROT SERPL-MCNC: 7.5 G/DL — SIGNIFICANT CHANGE UP (ref 6–8.3)
PROTHROM AB SERPL-ACNC: 12.5 SEC — SIGNIFICANT CHANGE UP (ref 10.5–13.4)
RBC # BLD: 5.06 M/UL — SIGNIFICANT CHANGE UP (ref 4.2–5.8)
RBC # FLD: 17.2 % — HIGH (ref 10.3–14.5)
RBC BLD AUTO: ABNORMAL
SMUDGE CELLS # BLD: PRESENT — SIGNIFICANT CHANGE UP
SODIUM SERPL-SCNC: 137 MMOL/L — SIGNIFICANT CHANGE UP (ref 135–145)
WBC # BLD: 6.82 K/UL — SIGNIFICANT CHANGE UP (ref 3.8–10.5)
WBC # FLD AUTO: 6.82 K/UL — SIGNIFICANT CHANGE UP (ref 3.8–10.5)

## 2023-06-07 PROCEDURE — 85025 COMPLETE CBC W/AUTO DIFF WBC: CPT

## 2023-06-07 PROCEDURE — 93005 ELECTROCARDIOGRAM TRACING: CPT

## 2023-06-07 PROCEDURE — C1887: CPT

## 2023-06-07 PROCEDURE — 82553 CREATINE MB FRACTION: CPT

## 2023-06-07 PROCEDURE — C1769: CPT

## 2023-06-07 PROCEDURE — 80053 COMPREHEN METABOLIC PANEL: CPT

## 2023-06-07 PROCEDURE — 82962 GLUCOSE BLOOD TEST: CPT

## 2023-06-07 PROCEDURE — C1894: CPT

## 2023-06-07 PROCEDURE — 83036 HEMOGLOBIN GLYCOSYLATED A1C: CPT

## 2023-06-07 PROCEDURE — 85730 THROMBOPLASTIN TIME PARTIAL: CPT

## 2023-06-07 PROCEDURE — 82550 ASSAY OF CK (CPK): CPT

## 2023-06-07 PROCEDURE — 93010 ELECTROCARDIOGRAM REPORT: CPT

## 2023-06-07 PROCEDURE — 83735 ASSAY OF MAGNESIUM: CPT

## 2023-06-07 PROCEDURE — 93458 L HRT ARTERY/VENTRICLE ANGIO: CPT

## 2023-06-07 PROCEDURE — 93458 L HRT ARTERY/VENTRICLE ANGIO: CPT | Mod: 26

## 2023-06-07 PROCEDURE — 85610 PROTHROMBIN TIME: CPT

## 2023-06-07 RX ORDER — OXYBUTYNIN CHLORIDE 5 MG
1 TABLET ORAL
Refills: 0 | DISCHARGE

## 2023-06-07 RX ORDER — INSULIN LISPRO 100/ML
VIAL (ML) SUBCUTANEOUS ONCE
Refills: 0 | Status: COMPLETED | OUTPATIENT
Start: 2023-06-07 | End: 2023-06-07

## 2023-06-07 RX ORDER — DEXTROSE 50 % IN WATER 50 %
25 SYRINGE (ML) INTRAVENOUS ONCE
Refills: 0 | Status: DISCONTINUED | OUTPATIENT
Start: 2023-06-07 | End: 2023-06-21

## 2023-06-07 RX ORDER — TAMSULOSIN HYDROCHLORIDE 0.4 MG/1
1 CAPSULE ORAL
Qty: 0 | Refills: 0 | DISCHARGE

## 2023-06-07 RX ORDER — OXYBUTYNIN CHLORIDE 5 MG
1 TABLET ORAL
Qty: 0 | Refills: 0 | DISCHARGE

## 2023-06-07 RX ORDER — MAGNESIUM SULFATE 500 MG/ML
2 VIAL (ML) INJECTION ONCE
Refills: 0 | Status: COMPLETED | OUTPATIENT
Start: 2023-06-07 | End: 2023-06-07

## 2023-06-07 RX ORDER — SODIUM CHLORIDE 9 MG/ML
250 INJECTION INTRAMUSCULAR; INTRAVENOUS; SUBCUTANEOUS ONCE
Refills: 0 | Status: COMPLETED | OUTPATIENT
Start: 2023-06-07 | End: 2023-06-07

## 2023-06-07 RX ORDER — SODIUM CHLORIDE 9 MG/ML
500 INJECTION INTRAMUSCULAR; INTRAVENOUS; SUBCUTANEOUS
Refills: 0 | Status: DISCONTINUED | OUTPATIENT
Start: 2023-06-07 | End: 2023-06-21

## 2023-06-07 RX ORDER — DEXTROSE 50 % IN WATER 50 %
15 SYRINGE (ML) INTRAVENOUS ONCE
Refills: 0 | Status: DISCONTINUED | OUTPATIENT
Start: 2023-06-07 | End: 2023-06-21

## 2023-06-07 RX ORDER — DEXTROSE 50 % IN WATER 50 %
12.5 SYRINGE (ML) INTRAVENOUS ONCE
Refills: 0 | Status: DISCONTINUED | OUTPATIENT
Start: 2023-06-07 | End: 2023-06-21

## 2023-06-07 RX ORDER — ASPIRIN/CALCIUM CARB/MAGNESIUM 324 MG
325 TABLET ORAL ONCE
Refills: 0 | Status: COMPLETED | OUTPATIENT
Start: 2023-06-07 | End: 2023-06-07

## 2023-06-07 RX ORDER — GLIMEPIRIDE 1 MG
1 TABLET ORAL
Refills: 0 | DISCHARGE

## 2023-06-07 RX ORDER — EMPAGLIFLOZIN 10 MG/1
1 TABLET, FILM COATED ORAL
Qty: 0 | Refills: 0 | DISCHARGE

## 2023-06-07 RX ORDER — GLUCAGON INJECTION, SOLUTION 0.5 MG/.1ML
1 INJECTION, SOLUTION SUBCUTANEOUS ONCE
Refills: 0 | Status: DISCONTINUED | OUTPATIENT
Start: 2023-06-07 | End: 2023-06-21

## 2023-06-07 RX ORDER — SODIUM CHLORIDE 9 MG/ML
1000 INJECTION, SOLUTION INTRAVENOUS
Refills: 0 | Status: DISCONTINUED | OUTPATIENT
Start: 2023-06-07 | End: 2023-06-21

## 2023-06-07 RX ORDER — SEMAGLUTIDE 0.68 MG/ML
0.5 INJECTION, SOLUTION SUBCUTANEOUS
Refills: 0 | DISCHARGE

## 2023-06-07 RX ORDER — CLOPIDOGREL BISULFATE 75 MG/1
600 TABLET, FILM COATED ORAL ONCE
Refills: 0 | Status: COMPLETED | OUTPATIENT
Start: 2023-06-07 | End: 2023-06-07

## 2023-06-07 RX ADMIN — SODIUM CHLORIDE 500 MILLILITER(S): 9 INJECTION INTRAMUSCULAR; INTRAVENOUS; SUBCUTANEOUS at 11:53

## 2023-06-07 RX ADMIN — CLOPIDOGREL BISULFATE 600 MILLIGRAM(S): 75 TABLET, FILM COATED ORAL at 11:53

## 2023-06-07 RX ADMIN — Medication 2: at 14:41

## 2023-06-07 RX ADMIN — SODIUM CHLORIDE 200 MILLILITER(S): 9 INJECTION INTRAMUSCULAR; INTRAVENOUS; SUBCUTANEOUS at 15:00

## 2023-06-07 RX ADMIN — Medication 25 GRAM(S): at 11:53

## 2023-06-07 RX ADMIN — Medication 325 MILLIGRAM(S): at 11:53

## 2023-06-07 RX ADMIN — SODIUM CHLORIDE 75 MILLILITER(S): 9 INJECTION INTRAMUSCULAR; INTRAVENOUS; SUBCUTANEOUS at 11:53

## 2023-06-07 NOTE — PROGRESS NOTE ADULT - SUBJECTIVE AND OBJECTIVE BOX
Interventional Cardiology PA SDA Discharge Note    Patient without complaints. Ambulated and voided without difficulties  Afebrile, VSS  Ext: Right Radial :  no  hematoma,   no  bleeding, dressing; C/D/I  Pulses:    intact RAD to baseline     A/P:    s/p diagnostic LHC (6/7/23): pRCA patent stent, mRCA 30% ISR, mLCx patent stent, OM1/OM2 mild disease (pOM1 patent stent), LAD mild disease. LVEDP 11mmHg.   ACCESS: R radial artery   Post cath IVF: NS 200cc/hr x2hrs.     1.	Stable for discharge as per attending Dr. Solis after bed rest, pt voids, groin/wrist stable and 30 minutes of ambulation.  2.	Follow-up with PMD/Cardiologist Dr. Solis in 1-2 weeks  3.	Discharged forms signed and copies in chart

## 2023-06-09 DIAGNOSIS — R94.39 ABNORMAL RESULT OF OTHER CARDIOVASCULAR FUNCTION STUDY: ICD-10-CM

## 2023-06-09 DIAGNOSIS — Z95.5 PRESENCE OF CORONARY ANGIOPLASTY IMPLANT AND GRAFT: ICD-10-CM

## 2023-06-09 DIAGNOSIS — I25.118 ATHEROSCLEROTIC HEART DISEASE OF NATIVE CORONARY ARTERY WITH OTHER FORMS OF ANGINA PECTORIS: ICD-10-CM

## 2023-09-12 NOTE — H&P ADULT - NSHPOUTPATIENTPROVIDERS_GEN_ALL_CORE
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 CMP originally dx'd in , follows Dr. Jovani Choi out of [facility]. Denies h/o HTN, HLD. Denies any family cardiac hx. Denies tobacco, EtOH, cannabis, illicits.     Pt presented to [facility] with c/o orthopnea/PND and generalized anascara with total weight gain of 50-70 lbs above baseline over the past several months. ECHO showed severely reduced EF 14% with dilated LV at 6.8 cm. RV function also appeared to be reduced. Labs with no troponin elevation, though did reveal A1c 14.4%, pt denies known h/o DM.     Followed by advanced HF at [facility]. Underwent aggressive IV diuresis with significant improvement in volume status with weight 264-> 220 lbs, though c/b worsening of his already decreased renal function. Initially started on some HF GDMT, though had to be held d/t worsening renal fxn and hypotenstion requiring initiation of midodrine and intermittent IV vasopressors. Underwent RHC () which revealed elevated filling pressures biventricularly with severely depressed cardiac index at 1.24. IABP was placed at this time and he was started on Dobutamine. C was held off d/t GARY, he has not had coronary assessment since HF diagnosis. Ultimately, decision was made to transfer pt to [facility] for evaluation for advanced HF therapies. Pt awake and alert on arrival with no complaints of dyspnea or CP, though he did note abdominal discomfort and loose stools. On arrival, supported on IABP, dobutamine,  Dr. Solis and vasopressin. Bumex and heparin gtts also running.    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 Denies Tobacco, EtOH, cannabis, illicits.     FHx: Denies any cardiac fhx.     Subj: Patient seen and examined this AM resting in bed. No acute complaints to report. [facility] postponed to tomorrow given recent fevers, now improved.     Key Events:  9/8/23: HR 60s-70s, SA, MAP 80s-90s, CVP 12, PAP 48/24/32, CO/CI 5/2.5, INR 1.4, BUN 68, Cr 1.4, Na 137, WBC 3.4, Hgb 16.3, , Bumex 0.5 mg/min, Dobutamine 0.5 mcg/kg/min, Heparin gtt, NC 3 L/min.  9/9/23: HR 80s-94, MAP 90s, CVP 10, PAP 49/25/33, CO/CI 6.1/3, BUN 47, Cr 1.26, Na 135, WBC 3.7, Hgb 17.3, PLT 81, Net -6351 cc/24h, Dobutamine 5 mcg/kg/min, Heparin gtt.  9/10/23: HR 90s-100s, MAP 80s-90s, CVP 10, PAP 44/27/32, CO/CI 6.4/3.1, BUN 36, Cr 1.25, Na 133, WBC 4.8, Hgb 17.5, PLT 63, Net -2368 cc/24h, Dobutamine 3 mcg/kg/min, Heparin gtt.  9/11/23: BUN 30, Cr 1.22, Na 129, WBC 6.4, Hgb 17.1, PLT 55, Net -1350 cc/24h.  9/12/23: MAP 85-90s, SBP 90-100s, HR 90-100s, Cr 1.22, Na 127, Hgb 16.3, PLT 62, Net -2.4 L/24h, on dobutamine 3 mcg/kg/min      ROS:  CONSTITUTIONAL:  +weight gain No weight loss, fever, chills, sweats.  HEENT:  No visual loss, blurred vision, double vision. No hearing loss, sneezing, congestion, runny nose or sore throat.  SKIN:  No rash or itching.  MUSCULOSKELETAL:  No joint redness or swelling. No recent trauma.  CARDIOVASCULAR: +REZA +Edema, improving +PND/orthopnea No chest pain, chest pressure or chest discomfort.   RESPIRATORY:  No cough or sputum.  GASTROINTESTINAL:  +abd discomfort, loose stools No anorexia, nausea, vomiting. No abdominal pain or blood.   GENITOURINARY:  No burning on urination, no decreased urine output.   NEUROLOGICAL:  No headache, dizziness, syncope, paralysis, ataxia, numbness or tingling in the extremities.   PSYCHIATRIC:  No history of depression or anxiety.      Past Medical History:   Diagnosis Date   • CHF (congestive  heart failure) (CMD) 07/01/2020    EF 15% at diagnosis       No past surgical history on file.    Family History   Problem Relation Age of Onset   • Diabetes Mother    • Coronary Artery Disease Father    • Cancer, Breast Sister    • Diabetes Brother    • Coronary Artery Disease Other         Patient states he has multiple cousins on his father side with stents   • Myocardial Infarction Other         Patient states he has multiple cousins on his father side who dies of MI       Social History     Tobacco Use   • Smoking status: Never   • Smokeless tobacco: Never   Vaping Use   • Vaping Use: never used   Substance Use Topics   • Alcohol use: Never   • Drug use: Never       Current Facility-Administered Medications   Medication   • sodium chloride 0.9 % flush bag 25 mL   • sodium chloride (PF) 0.9 % injection 2 mL   • polyethylene glycol (MIRALAX) packet 17 g   • cephalexin (KEFLEX) capsule 500 mg   • potassium CHLORIDE 40 mEq/100 mL IVPB premix   • bumetanide (BUMEX) injection 2 mg   • lactulose (CHRONULAC) 10 GM/15ML solution 10 g   • bisacodyl (DULCOLAX) suppository 10 mg   • insulin glargine (LANTUS) injection 10 Units   • insulin lispro (ADMELOG, HumaLOG) injection 5 Units   • cholecalciferol (VITAMIN D) tablet 50 mcg   • calcium carbonate (TUMS) chewable tablet 1,000 mg   • [Held by provider] heparin (porcine) 25,000 units/250 mL in dextrose 5 % infusion   • DOBUTamine (DOBUTREX) 500 mg/250 mL dextrose 5 % infusion   • lidocaine 2% urethral (UROJET) 2 % jelly 10 mL   • dextrose 50 % injection 25 g   • dextrose 50 % injection 12.5 g   • glucagon (GLUCAGEN) injection 1 mg   • dextrose (GLUTOSE) 40 % gel 15 g   • dextrose (GLUTOSE) 40 % gel 30 g   • insulin lispro (ADMELOG,HumaLOG) - Correction Dose   • vasopressin (VASOSTRICT) 20 unit/100 mL dextrose 5 % infusion   • sodium chloride 0.9 % flush bag 25 mL   • Potassium Standard Replacement Protocol (Levels 3.5 and lower)   • Potassium Replacement (Levels 3.6 - 4)    • Magnesium Standard Replacement Protocol   • Phosphorus Standard Replacement Protocol   • heparin 2 unit/mL in NaCL 0.9% solution   • acetaminophen (TYLENOL) tablet 650 mg       ALLERGIES:  No Known Allergies         Physical exam:  Visit Vitals  /79   Pulse (!) 105   Temp 99.3 °F (37.4 °C)   Resp 18   Ht 5' 11\" (1.803 m)   Wt 77.3 kg (170 lb 6.7 oz)   SpO2 99%   BMI 23.77 kg/m²       GENERAL:  NAD, AOx3.   HEENT:  Sclerae were anicteric and oropharynx was clear.    The carotid upstroke was normal and there were no bruits.   CHEST: Lungs bilat CTA.  No rales  CV:  NSR    No m/r/g    The PMI was not enlarged or displaced. No RV heave.    -JVD  -HJR  ABDOMEN: Soft, non-distended with active bowel sounds.     No hepatomegaly. No ascites  EXTREM: Warm, well-perfused and with no cyanosis, clubbing    No peripheral edema.   SKIN:  No ulcerations or rashes.   NEURO: Non-focal.  PSYCH:  Normal affect, normal mood    Recent labs:   I independently reviewed the following labs, echocardiograms, imaging, and procedures    Sodium (mmol/L)   Date Value   09/12/2023 129 (L)     Potassium (mmol/L)   Date Value   09/12/2023 3.9     Chloride (mmol/L)   Date Value   09/12/2023 93 (L)     Glucose (mg/dL)   Date Value   09/12/2023 175 (H)     Calcium (mg/dL)   Date Value   09/12/2023 8.7     Carbon Dioxide (mmol/L)   Date Value   09/12/2023 29     BUN (mg/dL)   Date Value   09/12/2023 34 (H)     Creatinine (mg/dL)   Date Value   09/12/2023 1.02       WBC (K/mcL)   Date Value   09/12/2023 4.9     RBC (mil/mcL)   Date Value   09/12/2023 6.31 (H)     HCT (%)   Date Value   09/12/2023 50.6     HGB (g/dL)   Date Value   09/12/2023 16.0     PLT (K/mcL)   Date Value   09/12/2023 77 (L)       TSH (mcUnits/mL)   Date Value   09/08/2023 2.430     NT-proBNP (pg/mL)   Date Value   08/28/2023 3,371 (H)       Encounter Date: 08/28/23   Electrocardiogram 12-Lead   Result Value    Ventricular Rate EKG/Min (BPM) 102    QRS-Interval (MSEC) 172     QT-Interval (MSEC) 458    QTc 597    R Axis (Degrees) 76    T Axis (Degrees) -70    REPORT TEXT      Atrial fibrillation  with rapid ventricular response  with premature ventricular or aberrantly conducted complexes   vs MAT vs Sinus tachycardia with prmature complexes  Nonspecific intraventricular block  Lateral infarct  (cited on or before  31-AUG-2023)  Abnormal ECG  When compared with ECG of  31-AUG-2023 01:55,  A change in the rhythm pattern is seen  QRS axis  shifted left  QT has lengthened  Confirmed by EMILY GAN PATRICK ([id number]) on 2023 8:12:49 AM         Imaging/Diagnostics:    Results for orders placed during the hospital encounter of 23    TRANSTHORACIC ECHO (TTE) COMPLETE W/ W/O IMAGING AGENT    Impression  *[facility]*  [address] [place] [id number] [phone]  Transthoracic Echocardiogram (TTE)    Patient: Lexii Juárez     Study Date/Time:         Aug 28 2023 3:23PM  MRN:     [id number]            FIN#:                    [id number]  :     1968         Ht/Wt:                   180.3cm 119.8kg  Age:     55                 BSA/BMI:                 2.49m^2 36.8kg/m^2  Gender:  M                  Baseline BP:             100 / 71  Ordering Physician:   Flaco Gan MD    Attending Physician:  Aden Hand  Performing Physician: AMG  Diagnostic Physician: Flaco Gan MD  Sonographer:          Mahad Freitas [facility], [facility]    ------------------------------------------------------------------------------  INDICATIONS:   Cardiomyopathy.    ------------------------------------------------------------------------------  STUDY CONCLUSIONS  SUMMARY:    1. Left ventricle: The cavity size is severely dilated. Wall thickness is  below normal limits. Systolic function is severely reduced. The ejection  fraction was measured by biplane method of disks. The ejection fraction is  14%.  2. Left atrium: The atrium is dilated.  3. Right ventricle:  The cavity size is dilated. Wall thickness is normal.  Systolic function is severely reduced. The RV pressure during systole is  20mm Hg.  4. Pericardium, extracardiac: There is no pericardial effusion.    ------------------------------------------------------------------------------  STUDY DATA:  [place]  Procedure:  A transthoracic echocardiogram was  performed. Image quality was adequate. Intravenous contrast (Definity 2ml) was  administered to enhance regional wall motion assessment, better define the  endocardial border, and opacify the left ventricle.  M-mode, complete 2D,  complete spectral Doppler, color Doppler, and intravenous contrast injection.  Study status:  Routine.  Study completion:  There were no complications.    FINDINGS    LEFT VENTRICLE:  The cavity size is severely dilated. Wall thickness is below  normal limits. Systolic function is severely reduced.    The ejection fraction  was measured by biplane method of disks. The ejection fraction is 14%. Cannot  assess LV diastolic function.    AORTIC VALVE:  The annulus is normal. The valve is structurally normal. The  valve is trileaflet. The leaflets are normal thickness. Cusp separation is  normal. Velocity is within the normal range. There is no stenosis. There is  trivial regurgitation. The mean systolic gradient is 0mm Hg. The peak systolic  gradient is 0mm Hg. The LVOT to aortic valve VTI ratio is 1.01. The ratio of  LVOT to aortic valve peak velocity is 0.81.    AORTA:  Aortic root: The root is normal-sized.  Ascending aorta: The vessel is normal-sized.    MITRAL VALVE:  The valve is structurally normal. The annulus is normal. The  leaflets are normal thickness. Leaflet separation is normal. Inflow velocity  is within the normal range. There is no evidence for stenosis. There is mild  regurgitation. The peak diastolic gradient is 2mm Hg. The valve area by  pressure half-time is 4.4cm^2. The valve area index by pressure half-time  is  1.76cm^2/m^2.    ATRIAL SEPTUM:  The septum is normal.  Color doppler shows no obvious shunt.    LEFT ATRIUM:  The atrium is dilated.    RIGHT VENTRICLE:  The cavity size is dilated. Wall thickness is normal.  Systolic function is severely reduced.       The RV pressure during systole is  20mm Hg.    VENTRICULAR SEPTUM:   Septal motion is dyssynergic.    PULMONIC VALVE:   The leaflets are normal thickness. There is mild  regurgitation.    TRICUSPID VALVE:  The valve is structurally normal. Leaflet separation is  normal. Inflow velocity is within the normal range. There is no evidence for  stenosis. There is mild regurgitation.    RIGHT ATRIUM:  The atrium is dilated.       The estimated central venous  pressure is 8mm Hg.    PERICARDIUM:   There is no pericardial effusion.   No evidence of pleural  fluid accumulation.    SYSTEMIC VEINS:  Inferior vena cava: The IVC is normal-sized.  Respirophasic diameter changes  are in the normal range (>= 50%).    ------------------------------------------------------------------------------  Measurements    Left ventricle            Value          Ref        Left atrium continued     Value          Ref  COLTON, LAX chord        (H) 6.8   cm       4.2 - 5.8  Area ES, A2C              34    cm^2     ---------  ESD, LAX chord        (H) 6.4   cm       2.5 - 4.0  Area/bsa ES, A2C          13.47 cm^2/m^2 ---------  CLOTON/bsa, LAX chord    (N) 2.7   cm/m^2   2.2 - 3.0  Vol, ES, 1-p A4C      (H) 103   ml       18 - 58  ESD/bsa, LAX chord    (H) 2.6   cm/m^2   1.3 - 2.1  Vol/bsa, ES, 1-p A4C  (H) 41    ml/m^2   12 - 37  PW, ED, LAX           (N) 0.7   cm       0.6 - 1.0  Vol, ES, 1-p A2C      (H) 132   ml       18 - 58  COLTON major ax, A4C         10.0  cm       ---------  Vol/bsa, ES, 1-p A2C  (H) 53    ml/m^2   11 - 43  ESD major ax, A4C         10.3  cm       ---------  Vol, ES, 2-p              124   ml       ---------  FS major axis, A4C        -3    %        ---------  Vol/bsa, ES,  2-p      (H) 50    ml/m^2   16 - 34  COLTON/bsa major ax, A4C     4.0   cm/m^2   ---------  AP dim, ES MM         (H) 4.8   cm       3.0 - 4.0  ESD/bsa major ax, A4C     4.1   cm/m^2   ---------  AP dim index, ES MM   (N) 1.9   cm/m^2   1.5 - 2.3  JALEN, A4C                  55.7  cm^2     ---------  LA/Ao root ratio, MM      1.45           ---------  MITCH, A4C                  52.3  cm^2     ---------  FAC, A4C                  6     %        ---------  Aortic valve              Value          Ref  IVS, ED               (N) 0.8   cm       0.6 - 1.0  Peak v, S                 0.3   m/sec    ---------  PW, ED                (N) 0.7   cm       0.6 - 1.0  Mean v, S                 0.21  m/sec    ---------  IVS/PW, ED                1.01           ---------  Mean grad, S              0     mm Hg    ---------  EDV                   (H) 319   ml       62 - 150   Peak grad, S              0     mm Hg    ---------  ESV                   (H) 267   ml       21 - 61    LVOT/AV, VTI ratio        1.01           ---------  EF                    (L) 14    %        52 - 72    LVOT/AV, Vpeak ratio      0.81           ---------  SV                        31    ml       ---------  EDV/bsa               (H) 128   ml/m^2   34 - 74    Mitral valve              Value          Ref  ESV/bsa               (H) 107   ml/m^2   11 - 31    E-septal separation       2.8   cm       ---------  SV/bsa                    12    ml/m^2   ---------  Peak E                    0.71  m/sec    ---------  SV, 1-p A4C               32    ml       ---------  Decel time                187   ms       ---------  SV/bsa, 1-p A4C           13    ml/m^2   ---------  PHT                       50    ms       ---------  EDV, 2-p              (H) 235   ml       62 - 150   Peak grad, D              2     mm Hg    ---------  ESV, 2-p              (H) 200   ml       21 - 61    MVA, PHT                  4.4   cm^2     ---------  SV, 2-p                   35    ml       ---------   MVA/bsa, PHT              1.76  cm^2/m^2 ---------  EDV/bsa, 2-p          (H) 94    ml/m^2   34 - 74  ESV/bsa, 2-p          (H) 80    ml/m^2   11 - 31    Tricuspid valve           Value          Ref  SV/bsa, 2-p               14    ml/m^2   ---------  TR peak v             (N) 1.8   m/sec    <=2.8  Peak RV-RA grad, S        12    mm Hg    ---------  LVOT                      Value          Ref  Peak kira, S               0.26  m/sec    ---------  Aortic root               Value          Ref  Peak grad, S              0     mm Hg    ---------  Root diam, ED         (N) 3.3   cm       3.0 - 4.5    Right ventricle           Value          Ref        Ascending aorta           Value          Ref  COLTON, LAX                  4.4   cm       ---------  AAo AP diam, ED       (N) 3.3   cm       2.2 - 3.8  Pressure, S               20    mm Hg    ---------  AAo AP diam/bsa, ED   (N) 1.3   cm/m^2   1.1 - 1.9    Left atrium               Value          Ref        Pulmonary artery          Value          Ref  AP dim, ES            (H) 5.2   cm       3.0 - 4.0  Pressure, S               20    mm Hg    ---------  AP dim index, ES      (N) 2.1   cm/m^2   1.5 - 2.3  SI dim, A4C               4.8   cm       ---------  Systemic veins            Value          Ref  Area ES, A4C          (H) 28    cm^2     <=20       Estimated CVP             8     mm Hg    ---------  Area/bsa ES, A4C          11.26 cm^2/m^2 ---------  Legend:  (L)  and  (H)  estela values outside specified reference range.    (N)  marks values inside specified reference range.    Prepared and electronically signed by  Flaco Macdonald MD  08/28/2023 16:57      Impression and plan:    Cardiogenic Shock  Acute on Chronic HFrEF  - Dx'd in 2020. Followed with Dr. Jovani Choi out of [facility].  - Has previously declined LHC and other invasive treatment in the past. Per notes, was agreeable to procedures at [facility] and underwent RHC, though LHC held off on d/t GARY  - Etiology is unclear  at this time. May need Cardiac MRI. Troponins wnl. No known hx HLD. Denies anginal sx, though ECHO features suggestive of ischemic etiology.   - Denies FHx cardiac dz  - NIDCM w/u  - CHRISTAL, ESR neg, HIV/CMV/EBV neg  - SPEP with supicious gamma region, f/u ZENIA  - Presented to [facility] with SOB, fluid overload, and fluid overload  - TTE from OSH with dilated LV (COLTON 6.8 cm), LVEF 14%, dilated RV with reduced function   - RHC (9/6/23 @[facility]): RA 24, RV 50/24, PA 51/33/41, PCW 23, DONA 0.8, AO Sat 98%, PA Sat 57%, CO 2.7, CI 1.2, PVR 6.6  - Started on  2.5 mcg/kg/min and IABP inserted at [facility] (9/6/23).  increased up to 5 mcg. Cont IABP 1:1  - S/p aggressive diuresis @[facility]. Weight from 264 -> 220 lbs, now down to 185 lbs. Continue diuresis with IV Bumex  - Weaned off pressors; hypotension/GARY limiting GDMT    - Coronary Angiogram scheduled (9/11/23) for assessment of potential need for revascularization, postponed to tomorrow given fever o/n  Current GDMT:  - Diuretic: IV Bumex 2 mg TID  - BB: None, on   - RAASi: None, GARY on CKD, HoTN  - MRA:  None, GARY on CKD, [facility]  - SGLT2i: None, GARY on CKD, would like to start pending renal fxn improvement given new dx DM2  - Hydral/Nitrate: None  - Inotrope: Dobutamine 3 mcg/kg/min  - AA: None  - AC: Heparin gtt  - Other: None  Device therapy: None  Advanced therapies: Not evaluated for    GARY on CKD Stage 3  - Baseline Cr 1.7, peak 2.47 at [facility]  - Held off LHC and stopped multiple GDMT d/t cr uptrend  - Improving, Bun/Cr currently 30/1.22  - On IV Bumex    Hx of LV Thrombus   - Was placed on Eliquis 2020  - Eliquis stopped at [facility] this admission. Per notes, no evidence for LV thrombus currently  - Continue heparin gtt for now    Thrombocytopenia  - Likely consumption from IABP  - PLT 55 today, will monitor  - PF4 negative  - Anti Smooth Muscle Antibody positive, titer 1:40  - Monoclonal Lambda light chain present  - Heme following, recs appreciated    Femoral  Lymphadenopathy  - Seen on LE US  - Thought to be 2/2 LE edema at [facility]    DM2  - New diagnosis. Pt denies prior known hx DM  - A1c 14.4 (8/28/23)  - OSH endo started insulin  - BG goal 140-180  - Inhouse endocrine consult    Hypothyroidism  - TSH 8.44, T4 1.4  - Continue Levothyroxine      Today's Plan:  55 M presented to [facility] with SOB   #Cardiogenic shock  requiring IABP and   #Acute on chronic systolic HF  #NICM (diagnosed in 2020)  #Fever   #GARY (resolved)  # H/o LV thrombus (2020).No LV thrombus on TTE this admission but severe apical akinesis   #Thrombocytopenia (PF4 neg) . Most likely consumptive     - TTE 8/28 at OSH LVEF 10%, LVIDd 6.8 cm, RV is dialted with mod to sever RV dysfx, mild MR, mild to mod TR.   - RHC (9/12/23 on IABP 1:1 and  3): RA 12, PA 43/31(35), PCWP 20, TPG 15, PA sat 65%, CO/CI 3.7/1.9, Judah 1, RA:PCW 0.6    - LHC deferred d/t fevers, improved today. BCx NGTD. Plan to do LHC on Thursday   - Continue Dobutamine 3 mcg/kg/min and IABP 1:1  - Holding Heparin gtt 2/2 thrombocytopenia, likely from consumption in s/o IABP  - Cont IV Bumex 2 mg TID, monitor UOP and renal function. Give one dose Tolvaptan today   -  Monoclonal Lambda light chain present. Heme following, recs appreciated. Plan to repeat SPEP as FLC are mildly up, IgG 1700. ZENIA in progress  - Anti Smooth Muscle Antibody positive, titer 1:40.NOrmal Liver Texture on US and CT.  GI is following   - A1c of 14.4 on (8/28/23), repeat (9/11/23) 12.2  - Uncontrolled DM precludes OHT at this time and pt not stable enough to wait for better control. Will present at [facility] tomorrow for LVAD as bridge to candidacy (BTC). Main concern for LVAD is RV dysfunction      Excess of   35  mins of critical care time was spent in reviewing pts data, formulating plan, coordinating care with greater than 50% of this time spent directly in caring for the patient bedside and counseling the patient and the family.    Charting performed by tam  Arturo Busch for Dr. Perry.    The documentation recorded by the scribe accurately and completely reflects the service(s) I personally performed and the decisions made by me.       Gonsalo Perry MD   Advanced Heart Failure, Mechanical Circulatory Support, and Transplant Cardiology  [facility]

## 2024-02-08 ENCOUNTER — APPOINTMENT (OUTPATIENT)
Dept: UROLOGY | Facility: CLINIC | Age: 74
End: 2024-02-08
Payer: MEDICARE

## 2024-02-08 VITALS
DIASTOLIC BLOOD PRESSURE: 67 MMHG | HEART RATE: 86 BPM | HEIGHT: 66 IN | SYSTOLIC BLOOD PRESSURE: 139 MMHG | WEIGHT: 228 LBS | TEMPERATURE: 98 F | BODY MASS INDEX: 36.64 KG/M2 | OXYGEN SATURATION: 100 %

## 2024-02-08 PROCEDURE — 99204 OFFICE O/P NEW MOD 45 MIN: CPT

## 2024-02-08 PROCEDURE — G2211 COMPLEX E/M VISIT ADD ON: CPT

## 2024-02-08 RX ORDER — ATORVASTATIN CALCIUM 80 MG/1
TABLET, FILM COATED ORAL
Refills: 0 | Status: ACTIVE | COMMUNITY

## 2024-02-08 RX ORDER — RAMIPRIL 10 MG/1
10 CAPSULE ORAL
Refills: 0 | Status: ACTIVE | COMMUNITY

## 2024-02-08 RX ORDER — CLOPIDOGREL BISULFATE 300 MG/1
TABLET, FILM COATED ORAL
Refills: 0 | Status: ACTIVE | COMMUNITY

## 2024-02-08 RX ORDER — FINASTERIDE 5 MG/1
5 TABLET, FILM COATED ORAL
Qty: 90 | Refills: 3 | Status: ACTIVE | COMMUNITY
Start: 2024-02-08 | End: 1900-01-01

## 2024-02-08 RX ORDER — SULFAMETHOXAZOLE AND TRIMETHOPRIM 800; 160 MG/1; MG/1
TABLET ORAL
Refills: 0 | Status: ACTIVE | COMMUNITY

## 2024-02-08 RX ORDER — BISACODYL 5 MG/1
5 TABLET ORAL
Refills: 0 | Status: ACTIVE | COMMUNITY

## 2024-02-08 RX ORDER — SITAGLIPTIN AND METFORMIN HYDROCHLORIDE 50; 1000 MG/1; MG/1
TABLET, FILM COATED ORAL
Refills: 0 | Status: ACTIVE | COMMUNITY

## 2024-02-08 RX ORDER — PHENAZOPYRIDINE 200 MG/1
200 TABLET, FILM COATED ORAL
Qty: 6 | Refills: 0 | Status: ACTIVE | COMMUNITY
Start: 2024-02-08 | End: 1900-01-01

## 2024-02-08 NOTE — ASSESSMENT
[FreeTextEntry1] : 73-year-old male with BPH and intermittent gross hematuria over the past 3 months.  For his BPH he is taking tamsulosin 0.4 mg twice daily.  He underwent a recent CT abdomen pelvis with and without contrast which demonstrates a 97 g prostate as well as an adrenal nodule that has increased in size from 2017 (1.5 -> 2.1cm).  Unfortunately it was not a CT urogram.  The CT scan also showed a thickened bladder wall which may be due to his BPH.  I discussed with him the need for cystoscopy and he will need cross-sectional imaging with delayed images to evaluate the collecting system.  Given the size of his prostate I also discussed adding finasteride 5 mg daily.  Following his gross hematuria workup I will discuss his adrenal nodule and refer to endocrinology for workup  Plan PVR done today 111 mL demonstrating the patient is not completely emptying bladder Urinalysis Urine culture Urine cytology I have reviewed the images of the patient's CT abdomen pelvis with and without iv contrast but no delayed images and discussed the results with the patient demonstrating an enlarged prostate of approximately 96 g Cystoscopy He will need delayed images to complete workup Continue tamsulosin 0.4 mg twice daily Start finasteride 5 mg daily: Prescription sent   Patient is being seen today for evaluation and management of a chronic and longitudinal ongoing condition and I am of the primary treating physician.

## 2024-02-08 NOTE — HISTORY OF PRESENT ILLNESS
[FreeTextEntry1] : 73-year-old male with intermittent gross hematuria for the past 3 months.  He underwent a CT abdomen and pelvis which did not demonstrate any hydro, stones, masses but did demonstrate an enlarged prostate of approximately 96 g.  He does have some baseline BPH symptoms including urinary frequency urgency and nocturia every hour.  For his BPH he is taking tamsulosin 0.4 mg twice daily but is not on finasteride.  His recent hematuria has made it difficult to empty his bladder and at times he is passing numerous clots and required hospitalization.  His urine is currently clear.  He underwent cardiac stent x 2 in May 2023 and is currently on aspirin and Plavix.  PVR: 111mL

## 2024-02-12 ENCOUNTER — APPOINTMENT (OUTPATIENT)
Dept: UROLOGY | Facility: CLINIC | Age: 74
End: 2024-02-12
Payer: MEDICARE

## 2024-02-12 VITALS
HEIGHT: 66 IN | HEART RATE: 80 BPM | BODY MASS INDEX: 36.64 KG/M2 | WEIGHT: 228 LBS | OXYGEN SATURATION: 98 % | TEMPERATURE: 98.1 F | SYSTOLIC BLOOD PRESSURE: 115 MMHG | RESPIRATION RATE: 16 BRPM | DIASTOLIC BLOOD PRESSURE: 75 MMHG

## 2024-02-12 LAB
APPEARANCE: ABNORMAL
BACTERIA UR CULT: ABNORMAL
BACTERIA: NEGATIVE /HPF
BILIRUBIN URINE: NEGATIVE
BLOOD URINE: ABNORMAL
CAST: 2 /LPF
COLOR: YELLOW
EPITHELIAL CELLS: 1 /HPF
GLUCOSE QUALITATIVE U: >=1000 MG/DL
KETONES URINE: NEGATIVE MG/DL
LEUKOCYTE ESTERASE URINE: ABNORMAL
MICROSCOPIC-UA: NORMAL
NITRITE URINE: NEGATIVE
PH URINE: 5.5
PROTEIN URINE: 30 MG/DL
RED BLOOD CELLS URINE: 5 /HPF
REVIEW: NORMAL
SPECIFIC GRAVITY URINE: 1.02
URINE CYTOLOGY: NORMAL
UROBILINOGEN URINE: 0.2 MG/DL
WHITE BLOOD CELLS URINE: 781 /HPF
YEAST-LIKE CELLS: PRESENT

## 2024-02-12 PROCEDURE — 52000 CYSTOURETHROSCOPY: CPT

## 2024-02-12 RX ORDER — FLUCONAZOLE 200 MG/1
200 TABLET ORAL
Qty: 1 | Refills: 0 | Status: ACTIVE | COMMUNITY
Start: 2024-02-09 | End: 1900-01-01

## 2024-02-14 LAB — URINE CYTOLOGY: NORMAL

## 2024-02-16 LAB — BACTERIA UR CULT: ABNORMAL

## 2024-02-26 ENCOUNTER — APPOINTMENT (OUTPATIENT)
Dept: UROLOGY | Facility: CLINIC | Age: 74
End: 2024-02-26

## 2024-03-04 ENCOUNTER — APPOINTMENT (OUTPATIENT)
Dept: UROLOGY | Facility: CLINIC | Age: 74
End: 2024-03-04
Payer: MEDICARE

## 2024-03-04 VITALS
BODY MASS INDEX: 36.64 KG/M2 | OXYGEN SATURATION: 98 % | HEIGHT: 66 IN | WEIGHT: 228 LBS | DIASTOLIC BLOOD PRESSURE: 74 MMHG | HEART RATE: 77 BPM | TEMPERATURE: 97.7 F | SYSTOLIC BLOOD PRESSURE: 148 MMHG | RESPIRATION RATE: 15 BRPM

## 2024-03-04 PROCEDURE — 99214 OFFICE O/P EST MOD 30 MIN: CPT

## 2024-03-04 PROCEDURE — G2211 COMPLEX E/M VISIT ADD ON: CPT

## 2024-03-04 RX ORDER — FLUCONAZOLE 200 MG/1
200 TABLET ORAL
Qty: 14 | Refills: 0 | Status: ACTIVE | COMMUNITY
Start: 2024-03-04 | End: 1900-01-01

## 2024-03-04 NOTE — HISTORY OF PRESENT ILLNESS
[FreeTextEntry1] : 73-year-old male with intermittent gross hematuria for the past 3 months.  He underwent a CT abdomen and pelvis which did not demonstrate any hydro, stones, masses but did demonstrate an enlarged prostate of approximately 96 g.  He does have some baseline BPH symptoms including urinary frequency urgency and nocturia every hour.  For his BPH he is taking tamsulosin 0.4 mg twice daily and finasteride. His cystoscopy did not demonstrate any masses but his urine from that appointment showed candiduria. He was prescribed a one time dose of diflucan but his symptoms have worsened with burning, frequency, urgency, and incontinence episodes   He underwent cardiac stent x 2 in May 2023 and is currently on aspirin and Plavix.  PVR: 109 ml (previously 111ml)

## 2024-03-04 NOTE — PATIENT PROFILE ADULT - FUNCTIONAL ASSESSMENT - BASIC MOBILITY 5.
Render In Strict Bullet Format?: No Detail Level: Zone Samples Given: Duoderm was applied in office today. A sample was given to patients mother. She was instructed to apply daily until she receives [place] 4 = No assist / stand by assistance

## 2024-03-04 NOTE — PHYSICAL EXAM
[Normal Appearance] : normal appearance [Well Groomed] : well groomed [General Appearance - In No Acute Distress] : no acute distress [Edema] : no peripheral edema [Respiration, Rhythm And Depth] : normal respiratory rhythm and effort [Exaggerated Use Of Accessory Muscles For Inspiration] : no accessory muscle use [Abdomen Soft] : soft [Abdomen Tenderness] : non-tender [Costovertebral Angle Tenderness] : no ~M costovertebral angle tenderness [Urinary Bladder Findings] : the bladder was normal on palpation [] : no rash [Normal Station and Gait] : the gait and station were normal for the patient's age [No Focal Deficits] : no focal deficits [Oriented To Time, Place, And Person] : oriented to person, place, and time [Affect] : the affect was normal [Mood] : the mood was normal [No Palpable Adenopathy] : no palpable adenopathy

## 2024-03-04 NOTE — ASSESSMENT
[FreeTextEntry1] : 73-year-old male with BPH and intermittent gross hematuria. His cystoscopy was negative. His CT Urogram did not demonstrate any hydro, stones, or masses but shows enlarged prostate of 96 grams. His urine was positive for candida and given worsening symptoms would treat with 2 weeks diflucan.   Plan PVR done today 109 mL demonstrating the patient is not completely emptying bladder Urinalysis Urine culture I have reviewed the images of the patient's CT urogram and discussed the results with the patient demonstrating an enlarged prostate of approximately 96 g without stones, hydro, or masses Fluconazole 200 mg daily for 2 weeks  Continue tamsulosin 0.4 mg twice daily Continue finasteride 5 mg daily: Prescription sent FU in 2 weeks   Patient is being seen today for evaluation and management of a chronic and longitudinal ongoing condition and I am of the primary treating physician.

## 2024-03-05 LAB
APPEARANCE: ABNORMAL
BACTERIA: NEGATIVE /HPF
BILIRUBIN URINE: NEGATIVE
BLOOD URINE: ABNORMAL
CAST: 1 /LPF
COLOR: YELLOW
EPITHELIAL CELLS: 3 /HPF
GLUCOSE QUALITATIVE U: NEGATIVE MG/DL
KETONES URINE: NEGATIVE MG/DL
LEUKOCYTE ESTERASE URINE: ABNORMAL
MICROSCOPIC-UA: NORMAL
NITRITE URINE: NEGATIVE
PH URINE: 5.5
PROTEIN URINE: 100 MG/DL
RED BLOOD CELLS URINE: NORMAL /HPF
REVIEW: NORMAL
SPECIFIC GRAVITY URINE: 1.01
UROBILINOGEN URINE: 0.2 MG/DL
WHITE BLOOD CELLS URINE: 714 /HPF
YEAST-LIKE CELLS: PRESENT

## 2024-03-07 LAB — BACTERIA UR CULT: ABNORMAL

## 2024-03-13 NOTE — PRE-ANESTHESIA EVALUATION ADULT - TEMPERATURE IN CELSIUS (DEGREES C)
----- Message from Nely Apodaca MD sent at 3/13/2024 10:36 AM CDT -----  Please call patient.  Labs are normal or within acceptable ranges.  Her creatinine is a little higher than her baseline.  Will continue to follow.     36.4

## 2024-03-18 ENCOUNTER — APPOINTMENT (OUTPATIENT)
Dept: UROLOGY | Facility: CLINIC | Age: 74
End: 2024-03-18
Payer: MEDICARE

## 2024-03-18 VITALS
OXYGEN SATURATION: 100 % | WEIGHT: 228 LBS | BODY MASS INDEX: 36.64 KG/M2 | SYSTOLIC BLOOD PRESSURE: 146 MMHG | TEMPERATURE: 97.2 F | HEIGHT: 66 IN | HEART RATE: 77 BPM | DIASTOLIC BLOOD PRESSURE: 75 MMHG

## 2024-03-18 PROCEDURE — 99214 OFFICE O/P EST MOD 30 MIN: CPT

## 2024-03-18 PROCEDURE — G2211 COMPLEX E/M VISIT ADD ON: CPT

## 2024-03-18 NOTE — ASSESSMENT
[FreeTextEntry1] : 73-year-old male with BPH and persistent candiduria causing significant urinary symptoms.  He has recently finished course of Diflucan but had no improvement in his overall symptoms.  Will repeat fungal culture and have the patient undergo CT urogram to ensure there is no fungal ball that is making it difficult to clear this infection.  Will consider treatment based on cultures versus referral to ID  Plan PVR done today 55 mL demonstrating the patient is not completely emptying bladder Urine culture Urine for fungal studies CT Urogram - to assess for any fungal ball Continue tamsulosin 0.4 mg twice daily Continue finasteride 5 mg daily: Prescription sent   Patient is being seen today for evaluation and management of a chronic and longitudinal ongoing condition and I am of the primary treating physician.

## 2024-03-18 NOTE — HISTORY OF PRESENT ILLNESS
[FreeTextEntry1] : 73-year-old male with intermittent gross hematuria for the past 3 months.  He underwent a CT abdomen and pelvis which did not demonstrate any hydro, stones, masses but did demonstrate an enlarged prostate of approximately 96 g.  He does have some baseline BPH symptoms including urinary frequency urgency and nocturia every hour.  For his BPH he is taking tamsulosin 0.4 mg twice daily and finasteride. His cystoscopy did not demonstrate any masses but his urine from that appointment showed candiduria. He was prescribed a one time dose of diflucan but his symptoms have worsened with burning, frequency, urgency, and incontinence episodes   He underwent cardiac stent x 2 in May 2023 and is currently on aspirin and Plavix.  PVR: 109 ml (previously 111ml)  Interval history: Finished course of diflucan but continues to have urgency, frequency, urge incontinence.   PVR: 52 ml.

## 2024-03-26 ENCOUNTER — NON-APPOINTMENT (OUTPATIENT)
Age: 74
End: 2024-03-26

## 2024-03-28 ENCOUNTER — APPOINTMENT (OUTPATIENT)
Dept: UROLOGY | Facility: CLINIC | Age: 74
End: 2024-03-28
Payer: MEDICARE

## 2024-03-28 VITALS
RESPIRATION RATE: 16 BRPM | BODY MASS INDEX: 36.64 KG/M2 | HEIGHT: 66 IN | WEIGHT: 228 LBS | HEART RATE: 82 BPM | SYSTOLIC BLOOD PRESSURE: 126 MMHG | DIASTOLIC BLOOD PRESSURE: 72 MMHG | TEMPERATURE: 97.3 F | OXYGEN SATURATION: 96 %

## 2024-03-28 LAB — BACTERIA UR CULT: ABNORMAL

## 2024-03-28 PROCEDURE — 99214 OFFICE O/P EST MOD 30 MIN: CPT

## 2024-03-28 PROCEDURE — G2211 COMPLEX E/M VISIT ADD ON: CPT

## 2024-03-28 RX ORDER — MICAFUNGIN SODIUM 100 MG/1
100 INJECTION, POWDER, LYOPHILIZED, FOR SOLUTION INTRAVENOUS DAILY
Qty: 2 | Refills: 0 | Status: ACTIVE | OUTPATIENT
Start: 2024-03-28

## 2024-03-28 NOTE — ASSESSMENT
[FreeTextEntry1] : 73-year-old male with BPH and persistent candiduria causing significant urinary symptoms.  He has recently finished course of Diflucan but had no improvement in his overall symptoms.  Will repeat fungal culture and have the patient undergo CT urogram to ensure there is no fungal ball that is making it difficult to clear this infection.  Will consider treatment based on cultures versus referral to ID  Plan Urine culture reviewed with sensitivities Patient will require IV treatment with micafungin 100 mg q 24 hr for 2 weeks CT Urogram images reviewed and discussed demonstrating thickened bladder wall but no fungal ball noted Continue tamsulosin 0.4 mg twice daily Continue finasteride 5 mg daily   Patient is being seen today for evaluation and management of a chronic and longitudinal ongoing condition and I am of the primary treating physician.

## 2024-03-28 NOTE — PHYSICAL EXAM
[Normal Appearance] : normal appearance [General Appearance - In No Acute Distress] : no acute distress [Well Groomed] : well groomed [Edema] : no peripheral edema [Respiration, Rhythm And Depth] : normal respiratory rhythm and effort [Exaggerated Use Of Accessory Muscles For Inspiration] : no accessory muscle use [Abdomen Tenderness] : non-tender [Abdomen Soft] : soft [Costovertebral Angle Tenderness] : no ~M costovertebral angle tenderness [Urinary Bladder Findings] : the bladder was normal on palpation [Normal Station and Gait] : the gait and station were normal for the patient's age [] : no rash [No Focal Deficits] : no focal deficits [Oriented To Time, Place, And Person] : oriented to person, place, and time [Affect] : the affect was normal [No Palpable Adenopathy] : no palpable adenopathy [Mood] : the mood was normal

## 2024-03-28 NOTE — HISTORY OF PRESENT ILLNESS
[FreeTextEntry1] : 73-year-old male with intermittent gross hematuria for the past 3 months.  He underwent a CT abdomen and pelvis which did not demonstrate any hydro, stones, masses but did demonstrate an enlarged prostate of approximately 96 g.  He does have some baseline BPH symptoms including urinary frequency urgency and nocturia every hour.  For his BPH he is taking tamsulosin 0.4 mg twice daily and finasteride. His cystoscopy did not demonstrate any masses but his urine from that appointment showed candiduria. He was prescribed a one time dose of diflucan but his symptoms have worsened with burning, frequency, urgency, and incontinence episodes   He underwent cardiac stent x 2 in May 2023 and is currently on aspirin and Plavix.  Interval history: Finished course of diflucan but continues to have urgency, frequency, urge incontinence. Obtained sensitivities to culture which was resistant to diflucan

## 2024-04-12 ENCOUNTER — NON-APPOINTMENT (OUTPATIENT)
Age: 74
End: 2024-04-12

## 2024-04-15 ENCOUNTER — NON-APPOINTMENT (OUTPATIENT)
Age: 74
End: 2024-04-15

## 2024-04-18 ENCOUNTER — APPOINTMENT (OUTPATIENT)
Dept: UROLOGY | Facility: CLINIC | Age: 74
End: 2024-04-18
Payer: MEDICARE

## 2024-04-18 VITALS
BODY MASS INDEX: 36.64 KG/M2 | SYSTOLIC BLOOD PRESSURE: 144 MMHG | HEART RATE: 73 BPM | HEIGHT: 66 IN | OXYGEN SATURATION: 100 % | DIASTOLIC BLOOD PRESSURE: 78 MMHG | WEIGHT: 228 LBS | TEMPERATURE: 96.5 F

## 2024-04-18 PROCEDURE — 99214 OFFICE O/P EST MOD 30 MIN: CPT

## 2024-04-18 PROCEDURE — G2211 COMPLEX E/M VISIT ADD ON: CPT

## 2024-04-18 NOTE — HISTORY OF PRESENT ILLNESS
[FreeTextEntry1] : [place]  [id number]  73-year-old male with intermittent gross hematuria for the past 3 months.  He underwent a CT abdomen and pelvis which did not demonstrate any hydro, stones, masses but did demonstrate an enlarged prostate of approximately 96 g.  He does have some baseline BPH symptoms including urinary frequency urgency and nocturia every hour.  For his BPH he is taking tamsulosin 0.4 mg twice daily and finasteride. His cystoscopy did not demonstrate any masses but his urine from that appointment showed candiduria. He was prescribed a one time dose of diflucan but his symptoms have worsened with burning, frequency, urgency, and incontinence episodes   He underwent cardiac stent x 2 in May 2023 and is currently on aspirin and Plavix.  Interval history: Patient continues to have severe urinary symptoms with frequency urgency dysuria and incontinence.  Sensitivities to candiduria were obtained and the patient was treated with micafungin for 12 days.  He states that his urinary symptoms have significantly improved however he is still having some frequency and discomfort.  His urine does appear to be much clear.  PVR 80 mL

## 2024-04-18 NOTE — ASSESSMENT
[FreeTextEntry1] : 73-year-old male with BPH and persistent candiduria causing significant urinary symptoms treated with micafungin for 12 days with significant improvement in symptoms however pt with some discomfort. Encouraged to continue increased hydration   Plan Urinalysis Urine culture PVR done today 80 ml demonstrating improved bladder emptying Continue tamsulosin 0.4 mg twice daily Continue finasteride 5 mg daily FU in 1 month to reassess symptoms   Patient is being seen today for evaluation and management of a chronic and longitudinal ongoing condition and I am of the primary treating physician.

## 2024-04-19 LAB
APPEARANCE: ABNORMAL
BACTERIA: NEGATIVE /HPF
BILIRUBIN URINE: NEGATIVE
BLOOD URINE: ABNORMAL
CAST: 0 /LPF
COLOR: ABNORMAL
EPITHELIAL CELLS: 1 /HPF
GLUCOSE QUALITATIVE U: >=1000 MG/DL
KETONES URINE: NEGATIVE MG/DL
LEUKOCYTE ESTERASE URINE: ABNORMAL
MICROSCOPIC-UA: NORMAL
NITRITE URINE: NEGATIVE
PH URINE: 5.5
PROTEIN URINE: 100 MG/DL
RED BLOOD CELLS URINE: 36 /HPF
SPECIFIC GRAVITY URINE: >1.03
UROBILINOGEN URINE: 0.2 MG/DL
WHITE BLOOD CELLS URINE: 79 /HPF

## 2024-04-22 LAB — BACTERIA UR CULT: NORMAL

## 2024-05-20 ENCOUNTER — APPOINTMENT (OUTPATIENT)
Dept: UROLOGY | Facility: CLINIC | Age: 74
End: 2024-05-20
Payer: MEDICARE

## 2024-05-20 VITALS
HEIGHT: 66 IN | DIASTOLIC BLOOD PRESSURE: 75 MMHG | HEART RATE: 86 BPM | WEIGHT: 228 LBS | BODY MASS INDEX: 36.64 KG/M2 | SYSTOLIC BLOOD PRESSURE: 127 MMHG | OXYGEN SATURATION: 97 % | TEMPERATURE: 97.6 F

## 2024-05-20 DIAGNOSIS — B37.49 OTHER UROGENITAL CANDIDIASIS: ICD-10-CM

## 2024-05-20 PROCEDURE — 99214 OFFICE O/P EST MOD 30 MIN: CPT

## 2024-05-20 PROCEDURE — G2211 COMPLEX E/M VISIT ADD ON: CPT

## 2024-05-20 RX ORDER — TAMSULOSIN HYDROCHLORIDE 0.4 MG/1
0.4 CAPSULE ORAL
Refills: 0 | Status: ACTIVE | COMMUNITY

## 2024-05-20 RX ORDER — TAMSULOSIN HYDROCHLORIDE 0.4 MG/1
0.4 CAPSULE ORAL
Qty: 180 | Refills: 3 | Status: ACTIVE | COMMUNITY
Start: 2024-05-20 | End: 1900-01-01

## 2024-05-20 NOTE — HISTORY OF PRESENT ILLNESS
[FreeTextEntry1] : Beng[place]  [id number]  73-year-old male with intermittent gross hematuria for the past 3 months.  He underwent a CT abdomen and pelvis which did not demonstrate any hydro, stones, masses but did demonstrate an enlarged prostate of approximately 96 g.  He does have some baseline BPH symptoms including urinary frequency urgency and nocturia every hour.  For his BPH he is taking tamsulosin 0.4 mg twice daily and finasteride. His cystoscopy did not demonstrate any masses but his urine from that appointment showed candiduria. He was prescribed a one time dose of diflucan but his symptoms have worsened with burning, frequency, urgency, and incontinence episodes   He underwent cardiac stent x 2 in May 2023 and is currently on aspirin and Plavix.  He was treated with IV antifungal with some improvement in symptoms but has returned to having gross hematuria with dysuria frequency and urgency incontinence.   His urine has cleared up but continues to have urinary frequency  PVR - 143 ml and asked to double void another 90 ml.

## 2024-05-20 NOTE — ASSESSMENT
[FreeTextEntry1] : 73-year-old male with BPH and recurrent gross hematuria. Will send urine culture and fungal culture and start treatment based on results. If no infection will discuss repeating Cystoscopy to ensure no new masses. However, given patient's recurrent symptoms it is likely he will need to discuss undergoing UDS and consider TURP   Plan Urinalysis Urine culture Urine fungal culture PVR done today 143 ml and then asked to double void and was able to void additional 90 ml Continue tamsulosin 0.8 mg daily after dinner: prescription sent Continue finasteride 5 mg daily FU in 1 week Will call with results and send abx if indicated   Patient is being seen today for evaluation and management of a chronic and longitudinal ongoing condition and I am of the primary treating physician.

## 2024-05-23 ENCOUNTER — LABORATORY RESULT (OUTPATIENT)
Age: 74
End: 2024-05-23

## 2024-05-23 ENCOUNTER — APPOINTMENT (OUTPATIENT)
Dept: UROLOGY | Facility: CLINIC | Age: 74
End: 2024-05-23
Payer: MEDICARE

## 2024-05-23 VITALS
WEIGHT: 228 LBS | DIASTOLIC BLOOD PRESSURE: 74 MMHG | OXYGEN SATURATION: 97 % | HEART RATE: 79 BPM | HEIGHT: 66 IN | BODY MASS INDEX: 36.64 KG/M2 | SYSTOLIC BLOOD PRESSURE: 124 MMHG | TEMPERATURE: 97.3 F

## 2024-05-23 DIAGNOSIS — R30.0 DYSURIA: ICD-10-CM

## 2024-05-23 DIAGNOSIS — N20.0 CALCULUS OF KIDNEY: ICD-10-CM

## 2024-05-23 LAB
APPEARANCE: ABNORMAL
BACTERIA UR CULT: NORMAL
BACTERIA: NEGATIVE /HPF
BILIRUBIN URINE: NEGATIVE
BLOOD URINE: ABNORMAL
CALCIUM OXALATE CRYSTALS: PRESENT
CAST: 2 /LPF
COLOR: YELLOW
EPITHELIAL CELLS: 3 /HPF
GLUCOSE QUALITATIVE U: >=1000 MG/DL
HYALINE CASTS: PRESENT
KETONES URINE: NEGATIVE MG/DL
LEUKOCYTE ESTERASE URINE: ABNORMAL
MICROSCOPIC-UA: NORMAL
NITRITE URINE: NEGATIVE
PH URINE: 5.5
PROTEIN URINE: 30 MG/DL
RED BLOOD CELLS URINE: 19 /HPF
REVIEW: NORMAL
SPECIFIC GRAVITY URINE: 1.03
URINE CYTOLOGY: NORMAL
UROBILINOGEN URINE: 0.2 MG/DL
WHITE BLOOD CELLS URINE: 14 /HPF

## 2024-05-23 PROCEDURE — G2211 COMPLEX E/M VISIT ADD ON: CPT

## 2024-05-23 PROCEDURE — 99213 OFFICE O/P EST LOW 20 MIN: CPT

## 2024-05-23 NOTE — ASSESSMENT
[FreeTextEntry1] : 73-year-old male with BPH and recurrent gross hematuria. Will send urine culture and fungal culture and start treatment based on results. If no infection will discuss repeating Cystoscopy to ensure no new masses. However, given patient's recurrent symptoms it is likely he will need to discuss undergoing UDS and consider TURP   Plan Urinalysis with microscopic hematuria and pyuria Urine culture reviewed and negative Urine fungal culture repeated CT abd/pelvis non con to assess for ureteral stone Continue tamsulosin 0.8 mg daily after dinner Continue finasteride 5 mg daily FU in 2 weeks    Patient is being seen today for evaluation and management of a chronic and longitudinal ongoing condition and I am of the primary treating physician.

## 2024-05-23 NOTE — HISTORY OF PRESENT ILLNESS
[FreeTextEntry1] : 73-year-old male with intermittent gross hematuria for the past 3 months.  He underwent a CT abdomen and pelvis which did not demonstrate any hydro, stones, masses but did demonstrate an enlarged prostate of approximately 96 g.  He does have some baseline BPH symptoms including urinary frequency urgency and nocturia every hour.  For his BPH he is taking tamsulosin 0.4 mg twice daily and finasteride. His cystoscopy did not demonstrate any masses but his urine from that appointment showed candiduria. He was prescribed a one time dose of diflucan but his symptoms have worsened with burning, frequency, urgency, and incontinence episodes   He underwent cardiac stent x 2 in May 2023 and is currently on aspirin and Plavix.  He was treated with IV antifungal with some improvement in symptoms but has returned to having gross hematuria with dysuria frequency and urgency incontinence.   His urine has cleared up but continues to have urinary frequency.  We reviewed his labs with urine culture negative and urinalysis demonstrating microscopic hematuria with pyuria.

## 2024-06-13 ENCOUNTER — APPOINTMENT (OUTPATIENT)
Dept: UROLOGY | Facility: CLINIC | Age: 74
End: 2024-06-13
Payer: MEDICARE

## 2024-06-13 VITALS
TEMPERATURE: 96.8 F | HEART RATE: 73 BPM | WEIGHT: 228 LBS | DIASTOLIC BLOOD PRESSURE: 67 MMHG | HEIGHT: 66 IN | OXYGEN SATURATION: 98 % | BODY MASS INDEX: 36.64 KG/M2 | SYSTOLIC BLOOD PRESSURE: 134 MMHG

## 2024-06-13 DIAGNOSIS — R31.0 GROSS HEMATURIA: ICD-10-CM

## 2024-06-13 DIAGNOSIS — N40.0 BENIGN PROSTATIC HYPERPLASIA WITHOUT LOWER URINARY TRACT SYMPMS: ICD-10-CM

## 2024-06-13 PROCEDURE — G2211 COMPLEX E/M VISIT ADD ON: CPT

## 2024-06-13 PROCEDURE — 99213 OFFICE O/P EST LOW 20 MIN: CPT

## 2024-06-13 NOTE — HISTORY OF PRESENT ILLNESS
[FreeTextEntry1] : 73-year-old male with intermittent gross hematuria for the past 3 months.  He underwent a CT abdomen and pelvis which did not demonstrate any hydro, stones, masses but did demonstrate an enlarged prostate of approximately 96 g.  He does have some baseline BPH symptoms including urinary frequency urgency and nocturia every hour.  For his BPH he is taking tamsulosin 0.4 mg twice daily and finasteride. His cystoscopy did not demonstrate any masses but his urine from that appointment showed candiduria. He was prescribed a one time dose of diflucan but his symptoms have worsened with burning, frequency, urgency, and incontinence episodes   He underwent cardiac stent x 2 in May 2023 and is currently on aspirin and Plavix.  He was treated with IV antifungal with some improvement in symptoms but has returned to having gross hematuria with dysuria frequency and urgency incontinence.   His urine has cleared up but continues to have urinary frequency.  We reviewed his labs with urine culture negative and urinalysis demonstrating microscopic hematuria with pyuria.  He states his urinary symptoms have completely resolved and he has no dysuria.  PVR done today 0 mL

## 2024-06-13 NOTE — ASSESSMENT
[FreeTextEntry1] : 73-year-old male with BPH and recurrent gross hematuria which was likely infectious in nature.  He is now asymptomatic and PVR of 0 mL.  He will continue on tamsulosin and finasteride and follow-up in 3 months for PVR  Plan PVR done today: 0 ml demonstrating patient now fully emptying bladder Urinalysis with microscopic hematuria and pyuria Urine culture reviewed and negative Urine fungal culture reviewed and negative CT abd/pelvis non con reviewed and discussed with patient which shows resolution of cystitis.  Continue tamsulosin 0.8 mg daily after dinner Continue finasteride 5 mg daily FU in 3 months for PVR   Patient is being seen today for evaluation and management of a chronic and longitudinal ongoing condition and I am of the primary treating physician.

## 2024-09-16 ENCOUNTER — APPOINTMENT (OUTPATIENT)
Dept: UROLOGY | Facility: CLINIC | Age: 74
End: 2024-09-16

## 2024-09-16 DIAGNOSIS — N40.0 BENIGN PROSTATIC HYPERPLASIA WITHOUT LOWER URINARY TRACT SYMPMS: ICD-10-CM

## 2024-09-16 NOTE — ASSESSMENT
[FreeTextEntry1] : 73-year-old male with BPH and recurrent gross hematuria which was likely infectious in nature. He had candiduria which was eventually treated with iv antifungals. He is taking tamsulosin 0.8 mg daily and finasteride 5 mg daily.    Plan PVR done today: ml demonstrating patient now fully emptying bladder Urinalysis Continue tamsulosin 0.8 mg daily after dinner Continue finasteride 5 mg daily   Patient is being seen today for evaluation and management of a chronic and longitudinal ongoing condition and I am of the primary treating physician.

## 2024-09-16 NOTE — HISTORY OF PRESENT ILLNESS
[FreeTextEntry1] : 73-year-old male with intermittent gross hematuria for the past 3 months.  He underwent a CT abdomen and pelvis which did not demonstrate any hydro, stones, masses but did demonstrate an enlarged prostate of approximately 96 g.  He does have some baseline BPH symptoms including urinary frequency urgency and nocturia every hour.  For his BPH he is taking tamsulosin 0.4 mg twice daily and finasteride. His cystoscopy did not demonstrate any masses but his urine from that appointment showed candiduria. He was prescribed a one time dose of diflucan but his symptoms have worsened with burning, frequency, urgency, and incontinence episodes   He underwent cardiac stent x 2 in May 2023 and is currently on aspirin and Plavix.  He was treated with IV antifungal with some improvement in symptoms but has returned to having gross hematuria with dysuria frequency and urgency incontinence.   He states his urinary symptoms have completely resolved and he has no dysuria.    PVR -

## 2024-11-11 NOTE — PATIENT PROFILE ADULT - FUNCTIONAL ASSESSMENT - BASIC MOBILITY 4.
Brief Postoperative Note      Patient: Renzo Blas  YOB: 1942  MRN: [id number]    Date of Procedure: 11/11/2024    Pre-Op Diagnosis Codes:      * Gross hematuria [R31.0]    Post-Op Diagnosis: Same secondary to bph       Procedure(s):  CYSTOSCOPY CLOT EVACUATION AND FULGURATION WITH TRANSURETHERAL PROSTATE RESECTION    Surgeon(s):  Darius Mendoza MD    Assistant:      Anesthesia: Monitor Anesthesia Care    Estimated Blood Loss (mL): 200     Complications: None    Specimens:   ID Type Source Tests Collected by Time Destination   1 : urine for culture Urine Urine, Cystoscopic CULTURE, URINE Darius Mendoza MD 11/11/2024 1032    A : urine for cytology Urine Urine, Cystoscopic CYTOLOGY, NON-GYN Darius Mendoza MD 11/11/2024 1033        Implants:        Drains:   Urinary Catheter 11/11/24 3 Way (Active)       Findings:  Infection Present At Time Of Surgery (PATOS) (choose all levels that have infection present):  No infection present  Other Findings:     Electronically signed by Darius Mendoza MD on 11/11/2024 at 11:37 AM  
3 = A little assistance

## 2025-06-26 NOTE — H&P ADULT - NSICDXPASTSURGICALHX_GEN_ALL_CORE_FT
[FreeTextEntry2] : Follow Up: Bilaterial neck and Shoulder Pain PAST SURGICAL HISTORY:  No significant past surgical history

## (undated) DEVICE — SUT VICRYL 4-0 27" RB-1 UNDYED

## (undated) DEVICE — ENDOCATCH 10MM SPECIMEN POUCH

## (undated) DEVICE — GLV 7.5 PROTEXIS (WHITE)

## (undated) DEVICE — TROCAR ETHICON ENDOPATH XCEL BLADELESS 15MM X 100MM STABILITY

## (undated) DEVICE — GOWN XL W TOWEL

## (undated) DEVICE — FRAZIER CONNECTING TUBE 2FT 5MM

## (undated) DEVICE — DRSG TELFA 3 X 8

## (undated) DEVICE — Device

## (undated) DEVICE — SUT SILK 0 30" SH

## (undated) DEVICE — SUT VICRYL 2-0 27" SH

## (undated) DEVICE — ELCTR GROUNDING PAD ADULT COVIDIEN

## (undated) DEVICE — DRSG GAUZE PACKTNER ROLL

## (undated) DEVICE — CHEST DRAIN PLEUR-EVAC DRY/WET ADULT-PEDS SINGLE (QUICK)

## (undated) DEVICE — DVC ASCOPE 4 SNGL USE SLIM

## (undated) DEVICE — SUT VICRYL 4-0 18" RB-1 UNDYED (POP-OFF)

## (undated) DEVICE — SUT VICRYL 4-0 2" RB-1

## (undated) DEVICE — TUBING STRYKER PNEUMOCLEAR HIGH FLOW HEATED

## (undated) DEVICE — XI ENDOWRIST STAPLER SHEATH

## (undated) DEVICE — DRSG XEROFORM 5 X 9"

## (undated) DEVICE — D HELP - CLEARVIEW CLEARIFY SYSTEM

## (undated) DEVICE — VENODYNE/SCD SLEEVE CALF MEDIUM

## (undated) DEVICE — TROCAR ETHICON ENDOPATH XCEL BLADELESS 5MM X 100MM STABILITY

## (undated) DEVICE — SPONGE ENDO PEANUT 5MM

## (undated) DEVICE — STAPLER COVIDIEN ENDO GIA STANDARD HANDLE

## (undated) DEVICE — ENDOCATCH II 15MM

## (undated) DEVICE — SUT SILK 2-0 30" SH

## (undated) DEVICE — WARMING BLANKET LOWER ADULT